# Patient Record
Sex: MALE | Race: WHITE | NOT HISPANIC OR LATINO | Employment: OTHER | URBAN - METROPOLITAN AREA
[De-identification: names, ages, dates, MRNs, and addresses within clinical notes are randomized per-mention and may not be internally consistent; named-entity substitution may affect disease eponyms.]

---

## 2017-01-25 ENCOUNTER — TRANSCRIBE ORDERS (OUTPATIENT)
Dept: ADMINISTRATIVE | Facility: HOSPITAL | Age: 67
End: 2017-01-25

## 2017-01-25 DIAGNOSIS — M79.601 RIGHT ARM PAIN: Primary | ICD-10-CM

## 2017-02-02 ENCOUNTER — HOSPITAL ENCOUNTER (OUTPATIENT)
Dept: INFUSION THERAPY | Facility: HOSPITAL | Age: 67
Discharge: HOME | End: 2017-02-02
Attending: SPECIALIST | Admitting: PSYCHIATRY & NEUROLOGY

## 2017-02-02 DIAGNOSIS — M79.601 RIGHT ARM PAIN: ICD-10-CM

## 2017-02-02 PROCEDURE — 95886 MUSC TEST DONE W/N TEST COMP: CPT | Performed by: PSYCHIATRY & NEUROLOGY

## 2017-02-02 PROCEDURE — 95909 NRV CNDJ TST 5-6 STUDIES: CPT | Performed by: PSYCHIATRY & NEUROLOGY

## 2017-02-02 PROCEDURE — 95908 NRV CNDJ TST 3-4 STUDIES: CPT

## 2017-02-02 PROCEDURE — 95909 NRV CNDJ TST 5-6 STUDIES: CPT

## 2017-02-02 PROCEDURE — 95886 MUSC TEST DONE W/N TEST COMP: CPT

## 2018-01-03 ENCOUNTER — OFFICE VISIT CONVERTED (OUTPATIENT)
Dept: OTOLARYNGOLOGY | Facility: CLINIC | Age: 68
End: 2018-01-03
Attending: OTOLARYNGOLOGY

## 2018-01-03 ENCOUNTER — CONVERSION ENCOUNTER (OUTPATIENT)
Dept: OTOLARYNGOLOGY | Facility: CLINIC | Age: 68
End: 2018-01-03

## 2018-04-09 ENCOUNTER — OFFICE VISIT CONVERTED (OUTPATIENT)
Dept: FAMILY MEDICINE CLINIC | Age: 68
End: 2018-04-09
Attending: FAMILY MEDICINE

## 2018-04-26 ENCOUNTER — CONVERSION ENCOUNTER (OUTPATIENT)
Dept: UROLOGY | Facility: CLINIC | Age: 68
End: 2018-04-26

## 2018-04-26 ENCOUNTER — OFFICE VISIT CONVERTED (OUTPATIENT)
Dept: UROLOGY | Facility: CLINIC | Age: 68
End: 2018-04-26
Attending: UROLOGY

## 2018-05-02 ENCOUNTER — PROCEDURE VISIT CONVERTED (OUTPATIENT)
Dept: UROLOGY | Facility: CLINIC | Age: 68
End: 2018-05-02
Attending: UROLOGY

## 2018-05-15 ENCOUNTER — OFFICE VISIT CONVERTED (OUTPATIENT)
Dept: UROLOGY | Facility: CLINIC | Age: 68
End: 2018-05-15
Attending: UROLOGY

## 2018-05-15 ENCOUNTER — CONVERSION ENCOUNTER (OUTPATIENT)
Dept: UROLOGY | Facility: CLINIC | Age: 68
End: 2018-05-15

## 2018-06-01 ENCOUNTER — OFFICE VISIT CONVERTED (OUTPATIENT)
Dept: FAMILY MEDICINE CLINIC | Age: 68
End: 2018-06-01
Attending: NURSE PRACTITIONER

## 2018-09-26 ENCOUNTER — OFFICE VISIT CONVERTED (OUTPATIENT)
Dept: FAMILY MEDICINE CLINIC | Age: 68
End: 2018-09-26
Attending: NURSE PRACTITIONER

## 2019-01-17 ENCOUNTER — HOSPITAL ENCOUNTER (OUTPATIENT)
Dept: OTHER | Facility: HOSPITAL | Age: 69
Discharge: HOME | End: 2019-01-17
Attending: NURSE PRACTITIONER

## 2019-01-17 ENCOUNTER — OFFICE VISIT CONVERTED (OUTPATIENT)
Dept: FAMILY MEDICINE CLINIC | Age: 69
End: 2019-01-17
Attending: NURSE PRACTITIONER

## 2019-01-19 LAB — BACTERIA SPEC AEROBE CULT: NORMAL

## 2019-02-20 ENCOUNTER — HOSPITAL ENCOUNTER (OUTPATIENT)
Dept: OTHER | Facility: HOSPITAL | Age: 69
Discharge: HOME | End: 2019-02-20
Attending: FAMILY MEDICINE

## 2019-02-20 ENCOUNTER — OFFICE VISIT CONVERTED (OUTPATIENT)
Dept: FAMILY MEDICINE CLINIC | Age: 69
End: 2019-02-20
Attending: FAMILY MEDICINE

## 2019-02-20 LAB
ALBUMIN SERPL-MCNC: 4.3 G/DL (ref 3.5–5)
ALBUMIN/GLOB SERPL: 1.3 {RATIO} (ref 1.4–2.6)
ALP SERPL-CCNC: 61 U/L (ref 56–155)
ALT SERPL-CCNC: 31 U/L (ref 10–40)
ANION GAP SERPL CALC-SCNC: 15 MMOL/L (ref 8–19)
AST SERPL-CCNC: 21 U/L (ref 15–50)
BASOPHILS # BLD MANUAL: 0.03 10*3/UL (ref 0–0.2)
BASOPHILS NFR BLD MANUAL: 0.4 % (ref 0–3)
BILIRUB SERPL-MCNC: 0.22 MG/DL (ref 0.2–1.3)
BUN SERPL-MCNC: 16 MG/DL (ref 5–25)
BUN/CREAT SERPL: 20 {RATIO} (ref 6–20)
CALCIUM SERPL-MCNC: 9.6 MG/DL (ref 8.7–10.4)
CHLORIDE SERPL-SCNC: 97 MMOL/L (ref 99–111)
CHOLEST SERPL-MCNC: 223 MG/DL (ref 107–200)
CHOLEST/HDLC SERPL: 7.7 {RATIO} (ref 3–6)
CONV CO2: 27 MMOL/L (ref 22–32)
CONV TOTAL PROTEIN: 7.6 G/DL (ref 6.3–8.2)
CREAT UR-MCNC: 0.82 MG/DL (ref 0.7–1.2)
DEPRECATED RDW RBC AUTO: 41.9 FL
EOSINOPHIL # BLD MANUAL: 0.23 10*3/UL (ref 0–0.7)
EOSINOPHIL NFR BLD MANUAL: 3 % (ref 0–7)
ERYTHROCYTE [DISTWIDTH] IN BLOOD BY AUTOMATED COUNT: 12.4 % (ref 11.5–14.5)
GFR SERPLBLD BASED ON 1.73 SQ M-ARVRAT: >60 ML/MIN/{1.73_M2}
GLOBULIN UR ELPH-MCNC: 3.3 G/DL (ref 2–3.5)
GLUCOSE SERPL-MCNC: 115 MG/DL (ref 70–99)
GRANS (ABSOLUTE): 3.56 10*3/UL (ref 2–8)
GRANS: 45.9 % (ref 30–85)
HBA1C MFR BLD: 14.5 G/DL (ref 14–18)
HCT VFR BLD AUTO: 43.6 % (ref 42–52)
HDLC SERPL-MCNC: 29 MG/DL (ref 40–60)
IMM GRANULOCYTES # BLD: 0.03 10*3/UL (ref 0–0.54)
IMM GRANULOCYTES NFR BLD: 0.4 % (ref 0–0.43)
LDLC SERPL CALC-MCNC: 99 MG/DL (ref 70–100)
LYMPHOCYTES # BLD MANUAL: 3.27 10*3/UL (ref 1–5)
LYMPHOCYTES NFR BLD MANUAL: 8.2 % (ref 3–10)
MCH RBC QN AUTO: 30.4 PG (ref 27–31)
MCHC RBC AUTO-ENTMCNC: 33.3 G/DL (ref 33–37)
MCV RBC AUTO: 91.4 FL (ref 80–96)
MONOCYTES # BLD AUTO: 0.64 10*3/UL (ref 0.2–1.2)
OSMOLALITY SERPL CALC.SUM OF ELEC: 282 MOSM/KG (ref 273–304)
PLATELET # BLD AUTO: 205 10*3/UL (ref 130–400)
PMV BLD AUTO: 10.2 FL (ref 7.4–10.4)
POTASSIUM SERPL-SCNC: 4.4 MMOL/L (ref 3.5–5.3)
RBC # BLD AUTO: 4.77 10*6/UL (ref 4.7–6.1)
SODIUM SERPL-SCNC: 135 MMOL/L (ref 135–147)
TRIGL SERPL-MCNC: 732 MG/DL (ref 40–150)
TSH SERPL-ACNC: 1.77 M[IU]/L (ref 0.27–4.2)
VARIANT LYMPHS NFR BLD MANUAL: 42.1 % (ref 20–45)
WBC # BLD AUTO: 7.76 10*3/UL (ref 4.8–10.8)

## 2019-05-02 ENCOUNTER — HOSPITAL ENCOUNTER (OUTPATIENT)
Dept: OTHER | Facility: HOSPITAL | Age: 69
Discharge: HOME | End: 2019-05-02
Attending: FAMILY MEDICINE

## 2019-05-02 LAB
ANION GAP SERPL CALC-SCNC: 15 MMOL/L (ref 8–19)
BUN SERPL-MCNC: 16 MG/DL (ref 5–25)
BUN/CREAT SERPL: 17 {RATIO} (ref 6–20)
CALCIUM SERPL-MCNC: 9.4 MG/DL (ref 8.7–10.4)
CHLORIDE SERPL-SCNC: 97 MMOL/L (ref 99–111)
CHOLEST SERPL-MCNC: 214 MG/DL (ref 107–200)
CHOLEST/HDLC SERPL: 5.4 {RATIO} (ref 3–6)
CONV CO2: 29 MMOL/L (ref 22–32)
CREAT UR-MCNC: 0.92 MG/DL (ref 0.7–1.2)
EST. AVERAGE GLUCOSE BLD GHB EST-MCNC: 117 MG/DL
GFR SERPLBLD BASED ON 1.73 SQ M-ARVRAT: >60 ML/MIN/{1.73_M2}
GLUCOSE SERPL-MCNC: 113 MG/DL (ref 70–99)
HBA1C MFR BLD: 5.7 % (ref 3.5–5.7)
HDLC SERPL-MCNC: 40 MG/DL (ref 40–60)
LDLC SERPL CALC-MCNC: 139 MG/DL (ref 70–100)
OSMOLALITY SERPL CALC.SUM OF ELEC: 284 MOSM/KG (ref 273–304)
POTASSIUM SERPL-SCNC: 4.7 MMOL/L (ref 3.5–5.3)
SODIUM SERPL-SCNC: 136 MMOL/L (ref 135–147)
TRIGL SERPL-MCNC: 177 MG/DL (ref 40–150)
VLDLC SERPL-MCNC: 35 MG/DL (ref 5–37)

## 2019-06-13 ENCOUNTER — APPOINTMENT (OUTPATIENT)
Dept: PREADMISSION TESTING | Facility: HOSPITAL | Age: 69
End: 2019-06-13

## 2019-06-14 ENCOUNTER — APPOINTMENT (OUTPATIENT)
Dept: PREADMISSION TESTING | Facility: HOSPITAL | Age: 69
End: 2019-06-14

## 2019-06-14 ENCOUNTER — HOSPITAL ENCOUNTER (OUTPATIENT)
Dept: GENERAL RADIOLOGY | Facility: HOSPITAL | Age: 69
Discharge: HOME | End: 2019-06-14
Admitting: ORTHOPAEDIC SURGERY

## 2019-06-14 ENCOUNTER — HOSPITAL ENCOUNTER (OUTPATIENT)
Dept: GENERAL RADIOLOGY | Facility: HOSPITAL | Age: 69
Discharge: HOME | End: 2019-06-14

## 2019-06-14 VITALS
HEIGHT: 75 IN | SYSTOLIC BLOOD PRESSURE: 132 MMHG | DIASTOLIC BLOOD PRESSURE: 69 MMHG | WEIGHT: 234 LBS | BODY MASS INDEX: 29.09 KG/M2 | RESPIRATION RATE: 16 BRPM | OXYGEN SATURATION: 97 % | TEMPERATURE: 97.6 F | HEART RATE: 60 BPM

## 2019-06-14 LAB
ANION GAP SERPL CALCULATED.3IONS-SCNC: 10.8 MMOL/L
BUN BLD-MCNC: 15 MG/DL (ref 8–23)
BUN/CREAT SERPL: 16 (ref 7–25)
CALCIUM SPEC-SCNC: 9.7 MG/DL (ref 8.6–10.5)
CHLORIDE SERPL-SCNC: 97 MMOL/L (ref 98–107)
CO2 SERPL-SCNC: 29.2 MMOL/L (ref 22–29)
CREAT BLD-MCNC: 0.94 MG/DL (ref 0.76–1.27)
DEPRECATED RDW RBC AUTO: 42.5 FL (ref 37–54)
ERYTHROCYTE [DISTWIDTH] IN BLOOD BY AUTOMATED COUNT: 12.6 % (ref 12.3–15.4)
GFR SERPL CREATININE-BSD FRML MDRD: 80 ML/MIN/1.73
GLUCOSE BLD-MCNC: 115 MG/DL (ref 65–99)
HCT VFR BLD AUTO: 46.3 % (ref 37.5–51)
HGB BLD-MCNC: 14.9 G/DL (ref 13–17.7)
MCH RBC QN AUTO: 29.7 PG (ref 26.6–33)
MCHC RBC AUTO-ENTMCNC: 32.2 G/DL (ref 31.5–35.7)
MCV RBC AUTO: 92.4 FL (ref 79–97)
PLATELET # BLD AUTO: 184 10*3/MM3 (ref 140–450)
PMV BLD AUTO: 9.8 FL (ref 6–12)
POTASSIUM BLD-SCNC: 5.1 MMOL/L (ref 3.5–5.2)
RBC # BLD AUTO: 5.01 10*6/MM3 (ref 4.14–5.8)
SODIUM BLD-SCNC: 137 MMOL/L (ref 136–145)
WBC NRBC COR # BLD: 7.13 10*3/MM3 (ref 3.4–10.8)

## 2019-06-14 PROCEDURE — 73030 X-RAY EXAM OF SHOULDER: CPT

## 2019-06-14 PROCEDURE — 36415 COLL VENOUS BLD VENIPUNCTURE: CPT

## 2019-06-14 PROCEDURE — 93010 ELECTROCARDIOGRAM REPORT: CPT | Performed by: INTERNAL MEDICINE

## 2019-06-14 PROCEDURE — 80048 BASIC METABOLIC PNL TOTAL CA: CPT | Performed by: ORTHOPAEDIC SURGERY

## 2019-06-14 PROCEDURE — 93005 ELECTROCARDIOGRAM TRACING: CPT

## 2019-06-14 PROCEDURE — 71046 X-RAY EXAM CHEST 2 VIEWS: CPT

## 2019-06-14 PROCEDURE — 85027 COMPLETE CBC AUTOMATED: CPT | Performed by: ORTHOPAEDIC SURGERY

## 2019-06-14 RX ORDER — CHLORHEXIDINE GLUCONATE 500 MG/1
CLOTH TOPICAL
Status: ON HOLD | COMMUNITY
End: 2019-06-24

## 2019-06-14 RX ORDER — LISINOPRIL 20 MG/1
20 TABLET ORAL DAILY
COMMUNITY
End: 2021-08-25 | Stop reason: SDUPTHER

## 2019-06-14 RX ORDER — GABAPENTIN 300 MG/1
300 CAPSULE ORAL 4 TIMES DAILY
COMMUNITY
End: 2021-07-06

## 2019-06-14 RX ORDER — HYDROCODONE BITARTRATE AND ACETAMINOPHEN 10; 325 MG/1; MG/1
1 TABLET ORAL 3 TIMES DAILY PRN
Status: ON HOLD | COMMUNITY
End: 2019-06-24

## 2019-06-14 NOTE — DISCHARGE INSTRUCTIONS
2% CHLORAHEXIDINE GLUCONATE* CLOTH  Preparing or “prepping” skin before surgery can reduce the risk of infection at the surgical site. To make the process easier, [facility] has chosen disposable cloths moistened with a rinse-free, 2% Chlorhexidine Gluconate (CHG) antiseptic solution. The steps below outline the prepping process and should be carefully followed.        Use the prep cloth on the area that is circled in the diagram             Directions Night before Surgery  1) Shower using a fresh bar of anti-bacterial soap (such as Dial) and clean washcloth.  Use a clean towel to completely dry your skin.  2) Do not use any lotions, oils or creams on your skin.  3) Open the package and remove 1 cloth, wipe your skin for 30 seconds in a circular motion.  Allow to dry for 3 minutes.  4) Repeat #3 with second cloth.  5) Do not touch your eyes, ears, or mouth with the prep cloth.  6) Allow the wet prep solution to air dry.  7) Discard the prep cloth and wash your hands with soap and water.   8) Dress in clean bed clothes and sleep on fresh clean bed sheets.   9) You may experience some temporary itching after the prep.    Directions Day of Surgery  1) Repeat steps 1,2,3,4,5,6,7, and 9.   2) Dress in clean clothes before coming to the hospital.    BACTROBAN NASAL OINTMENT  There are many germs normally in your nose. Bactroban is an ointment that will help reduce these germs. Please follow these instructions for Bactroban use:      ____The day before surgery in the morning  Date________    ____The day before surgery in the evening              Date________    ____The day of surgery in the morning    Date________    **Squirt ½ package of Bactroban Ointment onto a cotton applicator and apply to inside of 1st nostril.  Squirt the remaining Bactroban and apply to the inside of the other nostril.    Take the following medications the morning of surgery with a small sip of water:  GABAPENTIN, PAIN PILLS AS  NEEDED      ARRIVAL TIME 1100 TO MAIN OR         General Instructions:  • Do not eat solid food after midnight the night before surgery.  • You may drink clear liquids day of surgery but must stop at least one hour before your hospital arrival time - CUTOFF TIME 1000  • It is beneficial for you to have a clear drink that contains carbohydrates the day of surgery.  We suggest a 12 to 20 ounce bottle of Gatorade or Powerade for non-diabetic patients or a 12 to 20 ounce bottle of G2 or Powerade Zero for diabetic patients. (Pediatric patients, are not advised to drink a 12 to 20 ounce carbohydrate drink)    Clear liquids are liquids you can see through.  Nothing red in color.     Plain water                               Sports drinks  Sodas                                   Gelatin (Jell-O)  Fruit juices without pulp such as white grape juice and apple juice  Popsicles that contain no fruit or yogurt  Tea or coffee (no cream or milk added)  Gatorade / Powerade  G2 / Powerade Zero    • Infants may have breast milk up to four hours before surgery.  • Infants drinking formula may drink formula up to six hours before surgery.   • Patients who avoid smoking, chewing tobacco and alcohol for 4 weeks prior to surgery have a reduced risk of post-operative complications.  Quit smoking as many days before surgery as you can.  • Do not smoke, use chewing tobacco or drink alcohol the day of surgery.   • If applicable bring your C-PAP/ BI-PAP machine.  • Bring any papers given to you in the doctor’s office.  • Wear clean comfortable clothes and socks.  • Do not wear contact lenses, false eyelashes or make-up.  Bring a case for your glasses.   • Bring crutches or walker if applicable.  • Remove all piercings.  Leave jewelry and any other valuables at home.  • Hair extensions with metal clips must be removed prior to surgery.  • The Pre-Admission Testing nurse will instruct you to bring medications if unable to obtain an accurate list  in Pre-Admission Testing.            Preventing a Surgical Site Infection:  • For 2 to 3 days before surgery, avoid shaving with a razor because the razor can irritate skin and make it easier to develop an infection.    • Any areas of open skin can increase the risk of a post-operative wound infection by allowing bacteria to enter and travel throughout the body.  Notify your surgeon if you have any skin wounds / rashes even if it is not near the expected surgical site.  The area will need assessed to determine if surgery should be delayed until it is healed.  • The night prior to surgery sleep in a clean bed with clean clothing.  Do not allow pets to sleep with you.  • Shower on the morning of surgery using a fresh bar of anti-bacterial soap (such as Dial) and clean washcloth.  Dry with a clean towel and dress in clean clothing.  • Ask your surgeon if you will be receiving antibiotics prior to surgery.  • Make sure you, your family, and all healthcare providers clean their hands with soap and water or an alcohol based hand  before caring for you or your wound.    Day of surgery:  Upon arrival, a Pre-op nurse and Anesthesiologist will review your health history, obtain vital signs, and answer questions you may have.  The only belongings needed at this time will be a list of your home medications and if applicable your C-PAP/BI-PAP machine.  If you are staying overnight your family can leave the rest of your belongings in the car and bring them to your room later.  A Pre-op nurse will start an IV and you may receive medication in preparation for surgery, including something to help you relax.  Your family will be able to see you in the Pre-op area.  While you are in surgery your family should notify the waiting room  if they leave the waiting room area and provide a contact phone number.    Please be aware that surgery does come with discomfort.  We want to make every effort to control your  discomfort so please discuss any uncontrolled symptoms with your nurse.   Your doctor will most likely have prescribed pain medications.      If you are going home after surgery you will receive individualized written care instructions before being discharged.  A responsible adult must drive you to and from the hospital on the day of your surgery and stay with you for 24 hours.    If you are staying overnight following surgery, you will be transported to your hospital room following the recovery period.  [facility] has all private rooms.    You have received a list of surgical assistants for your reference.  If you have any questions please call Pre-Admission Testing at [phone].  Deductibles and co-payments are collected on the day of service. Please be prepared to pay the required co-pay, deductible or deposit on the day of service as defined by your plan.

## 2019-06-24 ENCOUNTER — APPOINTMENT (OUTPATIENT)
Dept: GENERAL RADIOLOGY | Facility: HOSPITAL | Age: 69
End: 2019-06-24

## 2019-06-24 ENCOUNTER — ANESTHESIA EVENT (OUTPATIENT)
Dept: PERIOP | Facility: HOSPITAL | Age: 69
End: 2019-06-24

## 2019-06-24 ENCOUNTER — HOSPITAL ENCOUNTER (INPATIENT)
Facility: HOSPITAL | Age: 69
LOS: 1 days | Discharge: HOME | End: 2019-06-25
Attending: ORTHOPAEDIC SURGERY | Admitting: ORTHOPAEDIC SURGERY

## 2019-06-24 ENCOUNTER — ANESTHESIA (OUTPATIENT)
Dept: PERIOP | Facility: HOSPITAL | Age: 69
End: 2019-06-24

## 2019-06-24 DIAGNOSIS — M19.012 PRIMARY OSTEOARTHRITIS OF LEFT SHOULDER: Primary | ICD-10-CM

## 2019-06-24 LAB
HCT VFR BLD AUTO: 40.1 % (ref 37.5–51)
HGB BLD-MCNC: 12.9 G/DL (ref 13–17.7)

## 2019-06-24 PROCEDURE — C1776 JOINT DEVICE (IMPLANTABLE): HCPCS | Performed by: ORTHOPAEDIC SURGERY

## 2019-06-24 PROCEDURE — 85018 HEMOGLOBIN: CPT | Performed by: ORTHOPAEDIC SURGERY

## 2019-06-24 PROCEDURE — 73020 X-RAY EXAM OF SHOULDER: CPT

## 2019-06-24 PROCEDURE — 25010000002 NEOSTIGMINE 10 MG/10ML SOLUTION: Performed by: ANESTHESIOLOGY

## 2019-06-24 PROCEDURE — 25010000002 FENTANYL CITRATE (PF) 100 MCG/2ML SOLUTION: Performed by: ANESTHESIOLOGY

## 2019-06-24 PROCEDURE — 0RRK00Z REPLACEMENT OF LEFT SHOULDER JOINT WITH REVERSE BALL AND SOCKET SYNTHETIC SUBSTITUTE, OPEN APPROACH: ICD-10-PCS | Performed by: ORTHOPAEDIC SURGERY

## 2019-06-24 PROCEDURE — 25010000002 DEXAMETHASONE PER 1 MG: Performed by: ANESTHESIOLOGY

## 2019-06-24 PROCEDURE — 25010000002 PROPOFOL 10 MG/ML EMULSION: Performed by: ANESTHESIOLOGY

## 2019-06-24 PROCEDURE — L3670 SO ACRO/CLAV CAN WEB PRE OTS: HCPCS | Performed by: ORTHOPAEDIC SURGERY

## 2019-06-24 PROCEDURE — 25010000003 CEFAZOLIN IN DEXTROSE 2-4 GM/100ML-% SOLUTION: Performed by: ORTHOPAEDIC SURGERY

## 2019-06-24 PROCEDURE — 25010000002 ROPIVACAINE PER 1 MG: Performed by: ANESTHESIOLOGY

## 2019-06-24 PROCEDURE — 25010000002 PHENYLEPHRINE PER 1 ML: Performed by: ANESTHESIOLOGY

## 2019-06-24 PROCEDURE — 25010000002 MIDAZOLAM PER 1 MG: Performed by: ANESTHESIOLOGY

## 2019-06-24 PROCEDURE — 25010000002 SUCCINYLCHOLINE PER 20 MG: Performed by: ANESTHESIOLOGY

## 2019-06-24 PROCEDURE — 85014 HEMATOCRIT: CPT | Performed by: ORTHOPAEDIC SURGERY

## 2019-06-24 DEVICE — STEM HUM/SHLDR TRABECULARMETAL REV 16X130MM: Type: IMPLANTABLE DEVICE | Site: SHOULDER | Status: FUNCTIONAL

## 2019-06-24 DEVICE — SCRW FIX LK HEX 4.75X20MM: Type: IMPLANTABLE DEVICE | Site: SHOULDER | Status: FUNCTIONAL

## 2019-06-24 DEVICE — PIN STNMAN 3.2MM 9IN: Type: IMPLANTABLE DEVICE | Site: SHOULDER | Status: FUNCTIONAL

## 2019-06-24 DEVICE — BASEPLT GLEN COMPR MINI W TPR ADAPTR 25: Type: IMPLANTABLE DEVICE | Site: SHOULDER | Status: FUNCTIONAL

## 2019-06-24 DEVICE — IMPLANTABLE DEVICE: Type: IMPLANTABLE DEVICE | Site: SHOULDER | Status: FUNCTIONAL

## 2019-06-24 DEVICE — SCRW COMPRNSV CNTRL 6.5X20MM REUS: Type: IMPLANTABLE DEVICE | Site: SHOULDER | Status: FUNCTIONAL

## 2019-06-24 DEVICE — LINER HUM/SHLDR TRABECULARMETAL REV POLY 60DEG 36MM PLS0: Type: IMPLANTABLE DEVICE | Site: SHOULDER | Status: FUNCTIONAL

## 2019-06-24 DEVICE — GLENOSPHERE VERSA DIAL FXD OFFST36 PLS3: Type: IMPLANTABLE DEVICE | Site: SHOULDER | Status: FUNCTIONAL

## 2019-06-24 RX ORDER — DIPHENHYDRAMINE HCL 25 MG
25 CAPSULE ORAL
Status: DISCONTINUED | OUTPATIENT
Start: 2019-06-24 | End: 2019-06-24 | Stop reason: HOSPADM

## 2019-06-24 RX ORDER — PROPOFOL 10 MG/ML
VIAL (ML) INTRAVENOUS AS NEEDED
Status: DISCONTINUED | OUTPATIENT
Start: 2019-06-24 | End: 2019-06-24 | Stop reason: SURG

## 2019-06-24 RX ORDER — HYDROCODONE BITARTRATE AND ACETAMINOPHEN 7.5; 325 MG/1; MG/1
1 TABLET ORAL ONCE AS NEEDED
Status: DISCONTINUED | OUTPATIENT
Start: 2019-06-24 | End: 2019-06-24 | Stop reason: HOSPADM

## 2019-06-24 RX ORDER — PROMETHAZINE HYDROCHLORIDE 25 MG/ML
12.5 INJECTION, SOLUTION INTRAMUSCULAR; INTRAVENOUS ONCE AS NEEDED
Status: DISCONTINUED | OUTPATIENT
Start: 2019-06-24 | End: 2019-06-24 | Stop reason: HOSPADM

## 2019-06-24 RX ORDER — HYDROMORPHONE HYDROCHLORIDE 1 MG/ML
0.5 INJECTION, SOLUTION INTRAMUSCULAR; INTRAVENOUS; SUBCUTANEOUS
Status: DISCONTINUED | OUTPATIENT
Start: 2019-06-24 | End: 2019-06-25 | Stop reason: HOSPADM

## 2019-06-24 RX ORDER — FENTANYL CITRATE 50 UG/ML
50 INJECTION, SOLUTION INTRAMUSCULAR; INTRAVENOUS
Status: DISCONTINUED | OUTPATIENT
Start: 2019-06-24 | End: 2019-06-24 | Stop reason: HOSPADM

## 2019-06-24 RX ORDER — HYDRALAZINE HYDROCHLORIDE 20 MG/ML
5 INJECTION INTRAMUSCULAR; INTRAVENOUS
Status: DISCONTINUED | OUTPATIENT
Start: 2019-06-24 | End: 2019-06-24 | Stop reason: HOSPADM

## 2019-06-24 RX ORDER — PROMETHAZINE HYDROCHLORIDE 25 MG/1
25 TABLET ORAL ONCE AS NEEDED
Status: DISCONTINUED | OUTPATIENT
Start: 2019-06-24 | End: 2019-06-24 | Stop reason: HOSPADM

## 2019-06-24 RX ORDER — MELOXICAM 15 MG/1
15 TABLET ORAL DAILY
Status: DISCONTINUED | OUTPATIENT
Start: 2019-06-24 | End: 2019-06-25 | Stop reason: HOSPADM

## 2019-06-24 RX ORDER — ROCURONIUM BROMIDE 10 MG/ML
INJECTION, SOLUTION INTRAVENOUS AS NEEDED
Status: DISCONTINUED | OUTPATIENT
Start: 2019-06-24 | End: 2019-06-24 | Stop reason: SURG

## 2019-06-24 RX ORDER — CEFAZOLIN SODIUM 2 G/100ML
2 INJECTION, SOLUTION INTRAVENOUS
Status: COMPLETED | OUTPATIENT
Start: 2019-06-24 | End: 2019-06-24

## 2019-06-24 RX ORDER — OXYCODONE AND ACETAMINOPHEN 10; 325 MG/1; MG/1
TABLET ORAL
Status: COMPLETED
Start: 2019-06-24 | End: 2019-06-24

## 2019-06-24 RX ORDER — GLYCOPYRROLATE 0.2 MG/ML
INJECTION INTRAMUSCULAR; INTRAVENOUS AS NEEDED
Status: DISCONTINUED | OUTPATIENT
Start: 2019-06-24 | End: 2019-06-24 | Stop reason: SURG

## 2019-06-24 RX ORDER — OXYCODONE AND ACETAMINOPHEN 10; 325 MG/1; MG/1
1 TABLET ORAL EVERY 4 HOURS PRN
Status: DISCONTINUED | OUTPATIENT
Start: 2019-06-24 | End: 2019-06-25 | Stop reason: HOSPADM

## 2019-06-24 RX ORDER — GABAPENTIN 300 MG/1
300 CAPSULE ORAL ONCE
Status: COMPLETED | OUTPATIENT
Start: 2019-06-24 | End: 2019-06-24

## 2019-06-24 RX ORDER — SODIUM CHLORIDE, SODIUM LACTATE, POTASSIUM CHLORIDE, CALCIUM CHLORIDE 600; 310; 30; 20 MG/100ML; MG/100ML; MG/100ML; MG/100ML
9 INJECTION, SOLUTION INTRAVENOUS CONTINUOUS PRN
Status: DISCONTINUED | OUTPATIENT
Start: 2019-06-24 | End: 2019-06-24 | Stop reason: HOSPADM

## 2019-06-24 RX ORDER — NEOSTIGMINE METHYLSULFATE 1 MG/ML
INJECTION, SOLUTION INTRAVENOUS AS NEEDED
Status: DISCONTINUED | OUTPATIENT
Start: 2019-06-24 | End: 2019-06-24 | Stop reason: SURG

## 2019-06-24 RX ORDER — FLUMAZENIL 0.1 MG/ML
0.2 INJECTION INTRAVENOUS AS NEEDED
Status: DISCONTINUED | OUTPATIENT
Start: 2019-06-24 | End: 2019-06-24 | Stop reason: HOSPADM

## 2019-06-24 RX ORDER — POTASSIUM CHLORIDE 750 MG/1
10 TABLET, FILM COATED, EXTENDED RELEASE ORAL 2 TIMES DAILY
COMMUNITY
End: 2021-11-09

## 2019-06-24 RX ORDER — EPHEDRINE SULFATE 50 MG/ML
5 INJECTION, SOLUTION INTRAVENOUS ONCE AS NEEDED
Status: DISCONTINUED | OUTPATIENT
Start: 2019-06-24 | End: 2019-06-24 | Stop reason: HOSPADM

## 2019-06-24 RX ORDER — ROPIVACAINE HYDROCHLORIDE 5 MG/ML
INJECTION, SOLUTION EPIDURAL; INFILTRATION; PERINEURAL
Status: COMPLETED | OUTPATIENT
Start: 2019-06-24 | End: 2019-06-24

## 2019-06-24 RX ORDER — ONDANSETRON 2 MG/ML
4 INJECTION INTRAMUSCULAR; INTRAVENOUS EVERY 6 HOURS PRN
Status: DISCONTINUED | OUTPATIENT
Start: 2019-06-24 | End: 2019-06-25 | Stop reason: HOSPADM

## 2019-06-24 RX ORDER — TRANEXAMIC ACID 100 MG/ML
INJECTION, SOLUTION INTRAVENOUS AS NEEDED
Status: DISCONTINUED | OUTPATIENT
Start: 2019-06-24 | End: 2019-06-24 | Stop reason: SURG

## 2019-06-24 RX ORDER — LABETALOL HYDROCHLORIDE 5 MG/ML
5 INJECTION, SOLUTION INTRAVENOUS
Status: DISCONTINUED | OUTPATIENT
Start: 2019-06-24 | End: 2019-06-24 | Stop reason: HOSPADM

## 2019-06-24 RX ORDER — ONDANSETRON 4 MG/1
4 TABLET, FILM COATED ORAL EVERY 6 HOURS PRN
Status: DISCONTINUED | OUTPATIENT
Start: 2019-06-24 | End: 2019-06-25 | Stop reason: HOSPADM

## 2019-06-24 RX ORDER — PROMETHAZINE HYDROCHLORIDE 25 MG/ML
6.25 INJECTION, SOLUTION INTRAMUSCULAR; INTRAVENOUS
Status: DISCONTINUED | OUTPATIENT
Start: 2019-06-24 | End: 2019-06-24 | Stop reason: HOSPADM

## 2019-06-24 RX ORDER — LIDOCAINE HYDROCHLORIDE 20 MG/ML
INJECTION, SOLUTION INFILTRATION; PERINEURAL AS NEEDED
Status: DISCONTINUED | OUTPATIENT
Start: 2019-06-24 | End: 2019-06-24 | Stop reason: SURG

## 2019-06-24 RX ORDER — PROMETHAZINE HYDROCHLORIDE 25 MG/1
25 SUPPOSITORY RECTAL ONCE AS NEEDED
Status: DISCONTINUED | OUTPATIENT
Start: 2019-06-24 | End: 2019-06-24 | Stop reason: HOSPADM

## 2019-06-24 RX ORDER — MIDAZOLAM HYDROCHLORIDE 1 MG/ML
1 INJECTION INTRAMUSCULAR; INTRAVENOUS
Status: DISCONTINUED | OUTPATIENT
Start: 2019-06-24 | End: 2019-06-24 | Stop reason: HOSPADM

## 2019-06-24 RX ORDER — ACETAMINOPHEN 500 MG
1000 TABLET ORAL ONCE
Status: COMPLETED | OUTPATIENT
Start: 2019-06-24 | End: 2019-06-24

## 2019-06-24 RX ORDER — DOXYCYCLINE HYCLATE 100 MG/1
100 CAPSULE ORAL DAILY
COMMUNITY
End: 2021-11-09

## 2019-06-24 RX ORDER — OXYCODONE AND ACETAMINOPHEN 7.5; 325 MG/1; MG/1
1 TABLET ORAL ONCE AS NEEDED
Status: DISCONTINUED | OUTPATIENT
Start: 2019-06-24 | End: 2019-06-24 | Stop reason: HOSPADM

## 2019-06-24 RX ORDER — ACETAMINOPHEN 325 MG/1
650 TABLET ORAL ONCE AS NEEDED
Status: DISCONTINUED | OUTPATIENT
Start: 2019-06-24 | End: 2019-06-24 | Stop reason: HOSPADM

## 2019-06-24 RX ORDER — SENNA AND DOCUSATE SODIUM 50; 8.6 MG/1; MG/1
2 TABLET, FILM COATED ORAL 2 TIMES DAILY
Status: DISCONTINUED | OUTPATIENT
Start: 2019-06-24 | End: 2019-06-25 | Stop reason: HOSPADM

## 2019-06-24 RX ORDER — NALOXONE HCL 0.4 MG/ML
0.2 VIAL (ML) INJECTION AS NEEDED
Status: DISCONTINUED | OUTPATIENT
Start: 2019-06-24 | End: 2019-06-24 | Stop reason: HOSPADM

## 2019-06-24 RX ORDER — MAGNESIUM HYDROXIDE 1200 MG/15ML
LIQUID ORAL AS NEEDED
Status: DISCONTINUED | OUTPATIENT
Start: 2019-06-24 | End: 2019-06-24 | Stop reason: HOSPADM

## 2019-06-24 RX ORDER — MULTIPLE VITAMINS W/ MINERALS TAB 9MG-400MCG
1 TAB ORAL DAILY
Status: DISCONTINUED | OUTPATIENT
Start: 2019-06-25 | End: 2019-06-25 | Stop reason: HOSPADM

## 2019-06-24 RX ORDER — HYDROMORPHONE HYDROCHLORIDE 1 MG/ML
0.5 INJECTION, SOLUTION INTRAMUSCULAR; INTRAVENOUS; SUBCUTANEOUS
Status: DISCONTINUED | OUTPATIENT
Start: 2019-06-24 | End: 2019-06-24 | Stop reason: HOSPADM

## 2019-06-24 RX ORDER — SODIUM CHLORIDE 0.9 % (FLUSH) 0.9 %
3 SYRINGE (ML) INJECTION EVERY 12 HOURS SCHEDULED
Status: DISCONTINUED | OUTPATIENT
Start: 2019-06-24 | End: 2019-06-24 | Stop reason: HOSPADM

## 2019-06-24 RX ORDER — BUPIVACAINE HYDROCHLORIDE AND EPINEPHRINE 2.5; 5 MG/ML; UG/ML
INJECTION, SOLUTION INFILTRATION; PERINEURAL AS NEEDED
Status: DISCONTINUED | OUTPATIENT
Start: 2019-06-24 | End: 2019-06-24 | Stop reason: HOSPADM

## 2019-06-24 RX ORDER — FAMOTIDINE 10 MG/ML
20 INJECTION, SOLUTION INTRAVENOUS ONCE
Status: COMPLETED | OUTPATIENT
Start: 2019-06-24 | End: 2019-06-24

## 2019-06-24 RX ORDER — SUCCINYLCHOLINE CHLORIDE 20 MG/ML
INJECTION INTRAMUSCULAR; INTRAVENOUS AS NEEDED
Status: DISCONTINUED | OUTPATIENT
Start: 2019-06-24 | End: 2019-06-24 | Stop reason: SURG

## 2019-06-24 RX ORDER — MIDAZOLAM HYDROCHLORIDE 1 MG/ML
2 INJECTION INTRAMUSCULAR; INTRAVENOUS
Status: DISCONTINUED | OUTPATIENT
Start: 2019-06-24 | End: 2019-06-24 | Stop reason: HOSPADM

## 2019-06-24 RX ORDER — ONDANSETRON 2 MG/ML
4 INJECTION INTRAMUSCULAR; INTRAVENOUS ONCE AS NEEDED
Status: DISCONTINUED | OUTPATIENT
Start: 2019-06-24 | End: 2019-06-24 | Stop reason: HOSPADM

## 2019-06-24 RX ORDER — DOXYCYCLINE 100 MG/1
100 CAPSULE ORAL
Status: DISCONTINUED | OUTPATIENT
Start: 2019-06-25 | End: 2019-06-25 | Stop reason: HOSPADM

## 2019-06-24 RX ORDER — LISINOPRIL 20 MG/1
20 TABLET ORAL DAILY
Status: DISCONTINUED | OUTPATIENT
Start: 2019-06-25 | End: 2019-06-25 | Stop reason: HOSPADM

## 2019-06-24 RX ORDER — NALOXONE HCL 0.4 MG/ML
0.1 VIAL (ML) INJECTION
Status: DISCONTINUED | OUTPATIENT
Start: 2019-06-24 | End: 2019-06-25 | Stop reason: HOSPADM

## 2019-06-24 RX ORDER — BISACODYL 10 MG
10 SUPPOSITORY, RECTAL RECTAL DAILY PRN
Status: DISCONTINUED | OUTPATIENT
Start: 2019-06-24 | End: 2019-06-25 | Stop reason: HOSPADM

## 2019-06-24 RX ORDER — ACETAMINOPHEN 325 MG/1
325 TABLET ORAL EVERY 4 HOURS PRN
Status: DISCONTINUED | OUTPATIENT
Start: 2019-06-24 | End: 2019-06-25 | Stop reason: HOSPADM

## 2019-06-24 RX ORDER — CEFAZOLIN SODIUM 2 G/100ML
2 INJECTION, SOLUTION INTRAVENOUS EVERY 8 HOURS
Status: COMPLETED | OUTPATIENT
Start: 2019-06-24 | End: 2019-06-25

## 2019-06-24 RX ORDER — SODIUM CHLORIDE, SODIUM LACTATE, POTASSIUM CHLORIDE, CALCIUM CHLORIDE 600; 310; 30; 20 MG/100ML; MG/100ML; MG/100ML; MG/100ML
100 INJECTION, SOLUTION INTRAVENOUS CONTINUOUS
Status: DISCONTINUED | OUTPATIENT
Start: 2019-06-24 | End: 2019-06-25 | Stop reason: HOSPADM

## 2019-06-24 RX ORDER — SODIUM CHLORIDE 0.9 % (FLUSH) 0.9 %
3-10 SYRINGE (ML) INJECTION AS NEEDED
Status: DISCONTINUED | OUTPATIENT
Start: 2019-06-24 | End: 2019-06-24 | Stop reason: HOSPADM

## 2019-06-24 RX ORDER — GABAPENTIN 300 MG/1
300 CAPSULE ORAL 4 TIMES DAILY
Status: DISCONTINUED | OUTPATIENT
Start: 2019-06-24 | End: 2019-06-25 | Stop reason: HOSPADM

## 2019-06-24 RX ORDER — OXYCODONE AND ACETAMINOPHEN 10; 325 MG/1; MG/1
2 TABLET ORAL EVERY 4 HOURS PRN
Status: DISCONTINUED | OUTPATIENT
Start: 2019-06-24 | End: 2019-06-25 | Stop reason: HOSPADM

## 2019-06-24 RX ORDER — DEXAMETHASONE SODIUM PHOSPHATE 10 MG/ML
INJECTION INTRAMUSCULAR; INTRAVENOUS AS NEEDED
Status: DISCONTINUED | OUTPATIENT
Start: 2019-06-24 | End: 2019-06-24 | Stop reason: SURG

## 2019-06-24 RX ORDER — DIPHENHYDRAMINE HYDROCHLORIDE 50 MG/ML
12.5 INJECTION INTRAMUSCULAR; INTRAVENOUS
Status: DISCONTINUED | OUTPATIENT
Start: 2019-06-24 | End: 2019-06-24 | Stop reason: HOSPADM

## 2019-06-24 RX ADMIN — PHENYLEPHRINE HYDROCHLORIDE 100 MCG: 10 INJECTION INTRAVENOUS at 14:50

## 2019-06-24 RX ADMIN — PHENYLEPHRINE HYDROCHLORIDE 100 MCG: 10 INJECTION INTRAVENOUS at 14:05

## 2019-06-24 RX ADMIN — ACETAMINOPHEN 1000 MG: 500 TABLET, FILM COATED ORAL at 12:03

## 2019-06-24 RX ADMIN — OXYCODONE HYDROCHLORIDE AND ACETAMINOPHEN 2 TABLET: 10; 325 TABLET ORAL at 16:15

## 2019-06-24 RX ADMIN — EPHEDRINE SULFATE 10 MG: 50 INJECTION INTRAMUSCULAR; INTRAVENOUS; SUBCUTANEOUS at 14:13

## 2019-06-24 RX ADMIN — ROPIVACAINE HYDROCHLORIDE 30 ML: 5 INJECTION, SOLUTION EPIDURAL; INFILTRATION; PERINEURAL at 11:49

## 2019-06-24 RX ADMIN — NEOSTIGMINE METHYLSULFATE 3 MG: 1 INJECTION INTRAVENOUS at 15:06

## 2019-06-24 RX ADMIN — EPHEDRINE SULFATE 10 MG: 50 INJECTION INTRAMUSCULAR; INTRAVENOUS; SUBCUTANEOUS at 14:05

## 2019-06-24 RX ADMIN — GABAPENTIN 300 MG: 300 CAPSULE ORAL at 20:14

## 2019-06-24 RX ADMIN — FAMOTIDINE 20 MG: 10 INJECTION INTRAVENOUS at 12:04

## 2019-06-24 RX ADMIN — CEFAZOLIN SODIUM 2 G: 2 INJECTION, SOLUTION INTRAVENOUS at 13:51

## 2019-06-24 RX ADMIN — ROCURONIUM BROMIDE 30 MG: 10 INJECTION INTRAVENOUS at 14:01

## 2019-06-24 RX ADMIN — MELOXICAM 15 MG: 15 TABLET ORAL at 20:14

## 2019-06-24 RX ADMIN — SENNOSIDES,DOCUSATE SODIUM 2 TABLET: 50; 8.6 TABLET, FILM COATED ORAL at 20:14

## 2019-06-24 RX ADMIN — EPHEDRINE SULFATE 10 MG: 50 INJECTION INTRAMUSCULAR; INTRAVENOUS; SUBCUTANEOUS at 14:00

## 2019-06-24 RX ADMIN — DEXAMETHASONE SODIUM PHOSPHATE 8 MG: 10 INJECTION INTRAMUSCULAR; INTRAVENOUS at 13:51

## 2019-06-24 RX ADMIN — FENTANYL CITRATE 50 MCG: 50 INJECTION, SOLUTION INTRAMUSCULAR; INTRAVENOUS at 11:46

## 2019-06-24 RX ADMIN — SUCCINYLCHOLINE CHLORIDE 100 MG: 20 INJECTION, SOLUTION INTRAMUSCULAR; INTRAVENOUS; PARENTERAL at 13:51

## 2019-06-24 RX ADMIN — GABAPENTIN 300 MG: 300 CAPSULE ORAL at 12:03

## 2019-06-24 RX ADMIN — OXYCODONE HYDROCHLORIDE AND ACETAMINOPHEN 2 TABLET: 10; 325 TABLET ORAL at 20:14

## 2019-06-24 RX ADMIN — TRANEXAMIC ACID 1000 MG: 100 INJECTION, SOLUTION INTRAVENOUS at 14:19

## 2019-06-24 RX ADMIN — PROPOFOL 200 MG: 10 INJECTION, EMULSION INTRAVENOUS at 13:51

## 2019-06-24 RX ADMIN — SODIUM CHLORIDE, POTASSIUM CHLORIDE, SODIUM LACTATE AND CALCIUM CHLORIDE 100 ML/HR: 600; 310; 30; 20 INJECTION, SOLUTION INTRAVENOUS at 20:22

## 2019-06-24 RX ADMIN — CEFAZOLIN SODIUM 2 G: 2 INJECTION, SOLUTION INTRAVENOUS at 22:14

## 2019-06-24 RX ADMIN — LIDOCAINE HYDROCHLORIDE 50 MG: 20 INJECTION, SOLUTION INFILTRATION; PERINEURAL at 13:51

## 2019-06-24 RX ADMIN — SODIUM CHLORIDE, POTASSIUM CHLORIDE, SODIUM LACTATE AND CALCIUM CHLORIDE: 600; 310; 30; 20 INJECTION, SOLUTION INTRAVENOUS at 13:45

## 2019-06-24 RX ADMIN — GLYCOPYRROLATE 0.6 MG: 0.2 INJECTION INTRAMUSCULAR; INTRAVENOUS at 15:06

## 2019-06-24 RX ADMIN — PHENYLEPHRINE HYDROCHLORIDE 100 MCG: 10 INJECTION INTRAVENOUS at 14:13

## 2019-06-24 RX ADMIN — PHENYLEPHRINE HYDROCHLORIDE 100 MCG: 10 INJECTION INTRAVENOUS at 14:39

## 2019-06-24 RX ADMIN — MIDAZOLAM 2 MG: 1 INJECTION INTRAMUSCULAR; INTRAVENOUS at 11:46

## 2019-06-24 RX ADMIN — SODIUM CHLORIDE, POTASSIUM CHLORIDE, SODIUM LACTATE AND CALCIUM CHLORIDE: 600; 310; 30; 20 INJECTION, SOLUTION INTRAVENOUS at 14:40

## 2019-06-24 NOTE — ANESTHESIA PROCEDURE NOTES
Airway  Urgency: elective    Date/Time: 6/24/2019 1:59 PM  Airway not difficult    General Information and Staff    Patient location during procedure: OR  Anesthesiologist: Wilfrido De Leon MD    Indications and Patient Condition  Indications for airway management: airway protection    Preoxygenated: yes      Final Airway Details  Final airway type: endotracheal airway      Successful airway: reinforced tube  Cuffed: yes   Successful intubation technique: direct laryngoscopy  Endotracheal tube insertion site: oral  Blade: Ladonna  Blade size: 3  Cormack-Lehane Classification: grade IIa - partial view of glottis  Placement verified by: chest auscultation and capnometry   Measured from: lips  ETT to lips (cm): 19  Number of attempts at approach: 1    Additional Comments  Atraumatic marisol #3 mac 8.0 johnathon x 1 ebe ebbs tube sec vent cont eyes taped

## 2019-06-24 NOTE — ANESTHESIA PREPROCEDURE EVALUATION
Anesthesia Evaluation     no history of anesthetic complications:               Airway   Mallampati: II  Neck ROM: full  no difficulty expected  Dental - normal exam     Pulmonary - normal exam   (+) a smoker Former,   (-) COPD, asthma, sleep apnea    PE comment: nonlabored  Cardiovascular - normal exam    Rhythm: regular  Rate: normal    (+) hypertension well controlled,   (-) valvular problems/murmurs, past MI, CAD, dysrhythmias, angina      Neuro/Psych  (+) psychiatric history Anxiety,     (-) seizures, TIA, CVA  GI/Hepatic/Renal/Endo - negative ROS   (-) GERD, liver disease, diabetes, hypothyroidism, hyperthyroidism    Musculoskeletal     (+) arthralgias, back pain,   Abdominal    Substance History      OB/GYN          Other   (+) arthritis                     Anesthesia Plan    ASA 2     general and general with block   (ISB for post-op pain control psr)  intravenous induction   Anesthetic plan, all risks, benefits, and alternatives have been provided, discussed and informed consent has been obtained with: patient.

## 2019-06-24 NOTE — ANESTHESIA POSTPROCEDURE EVALUATION
Patient: Ramirez Baltazar    Procedure Summary     Date:  06/24/19 Room / Location:  [facility] OR  / [facility] MAIN OR    Anesthesia Start:  1345 Anesthesia Stop:  1519    Procedure:  TOTAL SHOULDER REVERSE ARTHROPLASTY (Left Shoulder) Diagnosis:      Surgeon:  Enoch Goldman MD Provider:  Wilfrido De Leon MD    Anesthesia Type:  general, general with block ASA Status:  2          Anesthesia Type: general, general with block  Last vitals  BP   122/76 (06/24/19 1535)   Temp   36.4 °C (97.6 °F) (06/24/19 1515)   Pulse   66 (06/24/19 1535)   Resp   16 (06/24/19 1535)     SpO2   98 % (06/24/19 1535)     Post Anesthesia Care and Evaluation    Patient location during evaluation: bedside  Patient participation: complete - patient participated  Level of consciousness: awake  Pain score: 1  Pain management: adequate  Airway patency: patent  Anesthetic complications: No anesthetic complications    Cardiovascular status: acceptable  Respiratory status: acceptable  Hydration status: acceptable    Comments: --------------------            06/24/19 1535     --------------------   BP:       122/76     Pulse:      66       Resp:       16       Temp:                SpO2:      98%      --------------------

## 2019-06-24 NOTE — ANESTHESIA PROCEDURE NOTES
Peripheral Block      Patient reassessed immediately prior to procedure    Patient location during procedure: holding area  Start time: 6/24/2019 11:45 AM  Stop time: 6/24/2019 11:49 AM  Reason for block: at surgeon's request and post-op pain management  Performed by  Anesthesiologist: Nacho Mahmood MD  Preanesthetic Checklist  Completed: patient identified, site marked, surgical consent, pre-op evaluation, timeout performed, IV checked, risks and benefits discussed and monitors and equipment checked  Prep:  Sterile barriers:cap, gloves and mask  Prep: ChloraPrep  Patient monitoring: blood pressure monitoring, continuous pulse oximetry and EKG  Procedure  Sedation:yes    Guidance:ultrasound guided  ULTRASOUND INTERPRETATION.  Using ultrasound guidance a 21 G gauge needle was placed in close proximity to the brachial plexus nerve, at which point, under ultrasound guidance anesthetic was injected in the area of the nerve and spread of the anesthesia was seen on ultrasound in close proximity thereto.  There were no abnormalities seen on ultrasound; a digital image was taken; and the patient tolerated the procedure with no complications. Images:still images obtained, printed/placed on chart    Laterality:left  Block Type:interscalene  Injection Technique:single-shotNeedle Gauge:21 G      Medications Used: ropivacaine (NAROPIN) 0.5 % injection, 30 mL  Med admintered at 6/24/2019 11:49 AM      Post Assessment  Injection Assessment: negative aspiration for heme, no paresthesia on injection and incremental injection  Patient Tolerance:comfortable throughout block  Complications:no

## 2019-06-25 VITALS
BODY MASS INDEX: 29.09 KG/M2 | WEIGHT: 234 LBS | DIASTOLIC BLOOD PRESSURE: 59 MMHG | RESPIRATION RATE: 16 BRPM | TEMPERATURE: 96.7 F | HEIGHT: 75 IN | OXYGEN SATURATION: 97 % | SYSTOLIC BLOOD PRESSURE: 109 MMHG | HEART RATE: 57 BPM

## 2019-06-25 LAB
ANION GAP SERPL CALCULATED.3IONS-SCNC: 11.7 MMOL/L
BUN BLD-MCNC: 10 MG/DL (ref 8–23)
BUN/CREAT SERPL: 12.2 (ref 7–25)
CALCIUM SPEC-SCNC: 8.8 MG/DL (ref 8.6–10.5)
CHLORIDE SERPL-SCNC: 100 MMOL/L (ref 98–107)
CO2 SERPL-SCNC: 24.3 MMOL/L (ref 22–29)
CREAT BLD-MCNC: 0.82 MG/DL (ref 0.76–1.27)
GFR SERPL CREATININE-BSD FRML MDRD: 93 ML/MIN/1.73
GLUCOSE BLD-MCNC: 126 MG/DL (ref 65–99)
HCT VFR BLD AUTO: 41 % (ref 37.5–51)
HGB BLD-MCNC: 13.5 G/DL (ref 13–17.7)
POTASSIUM BLD-SCNC: 4.5 MMOL/L (ref 3.5–5.2)
SODIUM BLD-SCNC: 136 MMOL/L (ref 136–145)

## 2019-06-25 PROCEDURE — 80048 BASIC METABOLIC PNL TOTAL CA: CPT | Performed by: ORTHOPAEDIC SURGERY

## 2019-06-25 PROCEDURE — 25010000002 ENOXAPARIN PER 10 MG: Performed by: ORTHOPAEDIC SURGERY

## 2019-06-25 PROCEDURE — 85014 HEMATOCRIT: CPT | Performed by: ORTHOPAEDIC SURGERY

## 2019-06-25 PROCEDURE — 25010000003 CEFAZOLIN IN DEXTROSE 2-4 GM/100ML-% SOLUTION: Performed by: ORTHOPAEDIC SURGERY

## 2019-06-25 PROCEDURE — 85018 HEMOGLOBIN: CPT | Performed by: ORTHOPAEDIC SURGERY

## 2019-06-25 RX ORDER — SENNA AND DOCUSATE SODIUM 50; 8.6 MG/1; MG/1
2 TABLET, FILM COATED ORAL 2 TIMES DAILY
Qty: 90 TABLET | Refills: 1 | Status: SHIPPED | OUTPATIENT
Start: 2019-06-25 | End: 2021-11-09

## 2019-06-25 RX ORDER — OXYCODONE AND ACETAMINOPHEN 10; 325 MG/1; MG/1
1-2 TABLET ORAL EVERY 4 HOURS PRN
Qty: 56 TABLET | Refills: 0 | Status: SHIPPED | OUTPATIENT
Start: 2019-06-25 | End: 2019-07-04

## 2019-06-25 RX ADMIN — MULTIPLE VITAMINS W/ MINERALS TAB 1 TABLET: TAB at 08:53

## 2019-06-25 RX ADMIN — SERTRALINE 50 MG: 50 TABLET, FILM COATED ORAL at 08:53

## 2019-06-25 RX ADMIN — LISINOPRIL 20 MG: 20 TABLET ORAL at 08:53

## 2019-06-25 RX ADMIN — OXYCODONE HYDROCHLORIDE AND ACETAMINOPHEN 2 TABLET: 10; 325 TABLET ORAL at 10:11

## 2019-06-25 RX ADMIN — CEFAZOLIN SODIUM 2 G: 2 INJECTION, SOLUTION INTRAVENOUS at 06:03

## 2019-06-25 RX ADMIN — GABAPENTIN 300 MG: 300 CAPSULE ORAL at 08:53

## 2019-06-25 RX ADMIN — MELOXICAM 15 MG: 15 TABLET ORAL at 08:53

## 2019-06-25 RX ADMIN — ENOXAPARIN SODIUM 40 MG: 40 INJECTION SUBCUTANEOUS at 08:53

## 2019-06-25 RX ADMIN — OXYCODONE HYDROCHLORIDE AND ACETAMINOPHEN 1 TABLET: 10; 325 TABLET ORAL at 06:06

## 2019-06-25 RX ADMIN — SENNOSIDES,DOCUSATE SODIUM 2 TABLET: 50; 8.6 TABLET, FILM COATED ORAL at 08:53

## 2019-08-21 ENCOUNTER — OFFICE VISIT CONVERTED (OUTPATIENT)
Dept: FAMILY MEDICINE CLINIC | Age: 69
End: 2019-08-21
Attending: FAMILY MEDICINE

## 2019-10-01 ENCOUNTER — CONVERSION ENCOUNTER (OUTPATIENT)
Dept: OTHER | Facility: HOSPITAL | Age: 69
End: 2019-10-01

## 2019-10-02 ENCOUNTER — CONVERSION ENCOUNTER (OUTPATIENT)
Dept: CARDIOLOGY | Facility: CLINIC | Age: 69
End: 2019-10-02
Attending: INTERNAL MEDICINE

## 2019-10-28 ENCOUNTER — OFFICE VISIT CONVERTED (OUTPATIENT)
Dept: FAMILY MEDICINE CLINIC | Age: 69
End: 2019-10-28
Attending: FAMILY MEDICINE

## 2019-12-10 ENCOUNTER — OFFICE VISIT CONVERTED (OUTPATIENT)
Dept: FAMILY MEDICINE CLINIC | Age: 69
End: 2019-12-10
Attending: FAMILY MEDICINE

## 2019-12-16 ENCOUNTER — HOSPITAL ENCOUNTER (OUTPATIENT)
Dept: OTHER | Facility: HOSPITAL | Age: 69
Discharge: HOME | End: 2019-12-16
Attending: FAMILY MEDICINE

## 2020-04-20 ENCOUNTER — OFFICE VISIT CONVERTED (OUTPATIENT)
Dept: FAMILY MEDICINE CLINIC | Age: 70
End: 2020-04-20
Attending: FAMILY MEDICINE

## 2020-07-22 ENCOUNTER — HOSPITAL ENCOUNTER (OUTPATIENT)
Dept: OTHER | Facility: HOSPITAL | Age: 70
Discharge: HOME | End: 2020-07-22
Attending: NEUROLOGICAL SURGERY

## 2020-07-31 ENCOUNTER — HOSPITAL ENCOUNTER (OUTPATIENT)
Dept: PHYSICAL THERAPY | Facility: CLINIC | Age: 70
Setting detail: RECURRING SERIES
Discharge: HOME | End: 2020-09-09

## 2020-08-03 ENCOUNTER — HOSPITAL ENCOUNTER (OUTPATIENT)
Dept: OTHER | Facility: HOSPITAL | Age: 70
Discharge: HOME | End: 2020-08-03
Attending: FAMILY MEDICINE

## 2020-08-03 ENCOUNTER — OFFICE VISIT CONVERTED (OUTPATIENT)
Dept: FAMILY MEDICINE CLINIC | Age: 70
End: 2020-08-03
Attending: FAMILY MEDICINE

## 2020-08-03 LAB
ALBUMIN SERPL-MCNC: 4.4 G/DL (ref 3.5–5)
ALBUMIN/GLOB SERPL: 1.6 {RATIO} (ref 1.4–2.6)
ALP SERPL-CCNC: 72 U/L (ref 56–155)
ALT SERPL-CCNC: 21 U/L (ref 10–40)
ANION GAP SERPL CALC-SCNC: 23 MMOL/L (ref 8–19)
AST SERPL-CCNC: 18 U/L (ref 15–50)
BILIRUB SERPL-MCNC: 0.22 MG/DL (ref 0.2–1.3)
BUN SERPL-MCNC: 11 MG/DL (ref 5–25)
BUN/CREAT SERPL: 10 {RATIO} (ref 6–20)
CALCIUM SERPL-MCNC: 9.9 MG/DL (ref 8.7–10.4)
CHLORIDE SERPL-SCNC: 100 MMOL/L (ref 99–111)
CHOLEST SERPL-MCNC: 257 MG/DL (ref 107–200)
CHOLEST/HDLC SERPL: 8 {RATIO} (ref 3–6)
CONV CO2: 23 MMOL/L (ref 22–32)
CONV TOTAL PROTEIN: 7.1 G/DL (ref 6.3–8.2)
CREAT UR-MCNC: 1.06 MG/DL (ref 0.7–1.2)
ERYTHROCYTE [DISTWIDTH] IN BLOOD BY AUTOMATED COUNT: 13 % (ref 11.5–14.5)
GFR SERPLBLD BASED ON 1.73 SQ M-ARVRAT: >60 ML/MIN/{1.73_M2}
GLOBULIN UR ELPH-MCNC: 2.7 G/DL (ref 2–3.5)
GLUCOSE SERPL-MCNC: 93 MG/DL (ref 70–99)
HBA1C MFR BLD: 15.1 G/DL (ref 14–18)
HCT VFR BLD AUTO: 45.5 % (ref 42–52)
HDLC SERPL-MCNC: 32 MG/DL (ref 40–60)
LDLC SERPL CALC-MCNC: 121 MG/DL (ref 70–100)
MCH RBC QN AUTO: 29.8 PG (ref 27–31)
MCHC RBC AUTO-ENTMCNC: 33.2 G/DL (ref 33–37)
MCV RBC AUTO: 89.7 FL (ref 80–96)
OSMOLALITY SERPL CALC.SUM OF ELEC: 291 MOSM/KG (ref 273–304)
PLATELET # BLD AUTO: 250 10*3/UL (ref 130–400)
PMV BLD AUTO: 10.2 FL (ref 7.4–10.4)
POTASSIUM SERPL-SCNC: 4.8 MMOL/L (ref 3.5–5.3)
PSA SERPL-MCNC: 0.95 NG/ML (ref 0–4)
RBC # BLD AUTO: 5.07 10*6/UL (ref 4.7–6.1)
SODIUM SERPL-SCNC: 141 MMOL/L (ref 135–147)
TRIGL SERPL-MCNC: 749 MG/DL (ref 40–150)
WBC # BLD AUTO: 10.17 10*3/UL (ref 4.8–10.8)

## 2020-08-05 LAB — HCV AB S/CO SERPL IA: <0.1 S/CO RATIO (ref 0–0.9)

## 2020-08-12 ENCOUNTER — HOSPITAL ENCOUNTER (OUTPATIENT)
Dept: OTHER | Facility: HOSPITAL | Age: 70
Discharge: HOME | End: 2020-08-12
Attending: SURGERY

## 2020-08-18 ENCOUNTER — HOSPITAL ENCOUNTER (OUTPATIENT)
Dept: CARDIOLOGY | Facility: HOSPITAL | Age: 70
Discharge: HOME | End: 2020-08-18
Attending: SURGERY

## 2020-12-04 ENCOUNTER — OFFICE VISIT CONVERTED (OUTPATIENT)
Dept: FAMILY MEDICINE CLINIC | Age: 70
End: 2020-12-04
Attending: FAMILY MEDICINE

## 2021-02-22 ENCOUNTER — OFFICE VISIT CONVERTED (OUTPATIENT)
Dept: FAMILY MEDICINE CLINIC | Age: 71
End: 2021-02-22
Attending: FAMILY MEDICINE

## 2021-02-26 ENCOUNTER — HOSPITAL ENCOUNTER (OUTPATIENT)
Dept: OTHER | Facility: HOSPITAL | Age: 71
Discharge: HOME | End: 2021-02-26
Attending: SURGERY

## 2021-02-26 LAB
CREAT BLD-MCNC: 0.9 MG/DL (ref 0.6–1.4)
GFR SERPLBLD BASED ON 1.73 SQ M-ARVRAT: >60 ML/MIN/{1.73_M2}

## 2021-03-04 ENCOUNTER — HOSPITAL ENCOUNTER (OUTPATIENT)
Dept: CARDIOLOGY | Facility: HOSPITAL | Age: 71
Discharge: HOME | End: 2021-03-04
Attending: SURGERY

## 2021-03-12 ENCOUNTER — HOSPITAL ENCOUNTER (OUTPATIENT)
Dept: PREADMISSION TESTING | Facility: HOSPITAL | Age: 71
Discharge: HOME | End: 2021-03-12
Attending: SURGERY

## 2021-03-12 LAB
ALBUMIN SERPL-MCNC: 4.1 G/DL (ref 3.5–5)
ALBUMIN/GLOB SERPL: 1.3 {RATIO} (ref 1.4–2.6)
ALP SERPL-CCNC: 74 U/L (ref 56–155)
ALT SERPL-CCNC: 29 U/L (ref 10–40)
ANION GAP SERPL CALC-SCNC: 12 MMOL/L (ref 8–19)
AST SERPL-CCNC: 21 U/L (ref 15–50)
BASOPHILS # BLD AUTO: 0.05 10*3/UL (ref 0–0.2)
BASOPHILS NFR BLD AUTO: 0.7 % (ref 0–3)
BILIRUB SERPL-MCNC: 0.24 MG/DL (ref 0.2–1.3)
BUN SERPL-MCNC: 13 MG/DL (ref 5–25)
BUN/CREAT SERPL: 13 {RATIO} (ref 6–20)
CALCIUM SERPL-MCNC: 9.7 MG/DL (ref 8.7–10.4)
CHLORIDE SERPL-SCNC: 102 MMOL/L (ref 99–111)
CONV ABS IMM GRAN: 0.05 10*3/UL (ref 0–0.2)
CONV CO2: 28 MMOL/L (ref 22–32)
CONV IMMATURE GRAN: 0.7 % (ref 0–1.8)
CONV TOTAL PROTEIN: 7.3 G/DL (ref 6.3–8.2)
CREAT UR-MCNC: 0.97 MG/DL (ref 0.7–1.2)
DEPRECATED RDW RBC AUTO: 42.1 FL (ref 35.1–43.9)
EOSINOPHIL # BLD AUTO: 0.16 10*3/UL (ref 0–0.7)
EOSINOPHIL # BLD AUTO: 2.2 % (ref 0–7)
ERYTHROCYTE [DISTWIDTH] IN BLOOD BY AUTOMATED COUNT: 12.8 % (ref 11.6–14.4)
GFR SERPLBLD BASED ON 1.73 SQ M-ARVRAT: >60 ML/MIN/{1.73_M2}
GLOBULIN UR ELPH-MCNC: 3.2 G/DL (ref 2–3.5)
GLUCOSE SERPL-MCNC: 144 MG/DL (ref 70–99)
HCT VFR BLD AUTO: 42.9 % (ref 42–52)
HGB BLD-MCNC: 14.3 G/DL (ref 14–18)
LYMPHOCYTES # BLD AUTO: 2.39 10*3/UL (ref 1–5)
LYMPHOCYTES NFR BLD AUTO: 32.3 % (ref 20–45)
MCH RBC QN AUTO: 30.3 PG (ref 27–31)
MCHC RBC AUTO-ENTMCNC: 33.3 G/DL (ref 33–37)
MCV RBC AUTO: 90.9 FL (ref 80–96)
MONOCYTES # BLD AUTO: 0.58 10*3/UL (ref 0.2–1.2)
MONOCYTES NFR BLD AUTO: 7.8 % (ref 3–10)
NEUTROPHILS # BLD AUTO: 4.17 10*3/UL (ref 2–8)
NEUTROPHILS NFR BLD AUTO: 56.3 % (ref 30–85)
NRBC CBCN: 0 % (ref 0–0.7)
OSMOLALITY SERPL CALC.SUM OF ELEC: 289 MOSM/KG (ref 273–304)
PLATELET # BLD AUTO: 177 10*3/UL (ref 130–400)
PMV BLD AUTO: 9.9 FL (ref 9.4–12.4)
POTASSIUM SERPL-SCNC: 4.4 MMOL/L (ref 3.5–5.3)
RBC # BLD AUTO: 4.72 10*6/UL (ref 4.7–6.1)
SODIUM SERPL-SCNC: 138 MMOL/L (ref 135–147)
WBC # BLD AUTO: 7.4 10*3/UL (ref 4.8–10.8)

## 2021-03-13 LAB — SARS-COV-2 RNA SPEC QL NAA+PROBE: NOT DETECTED

## 2021-03-29 ENCOUNTER — OFFICE VISIT CONVERTED (OUTPATIENT)
Dept: FAMILY MEDICINE CLINIC | Age: 71
End: 2021-03-29
Attending: FAMILY MEDICINE

## 2021-04-20 ENCOUNTER — HOSPITAL ENCOUNTER (OUTPATIENT)
Dept: CARDIOLOGY | Facility: HOSPITAL | Age: 71
Discharge: HOME | End: 2021-04-20
Attending: SURGERY

## 2021-04-21 ENCOUNTER — HOSPITAL ENCOUNTER (OUTPATIENT)
Dept: OTHER | Facility: HOSPITAL | Age: 71
Discharge: HOME | End: 2021-04-21
Attending: PHYSICIAN ASSISTANT

## 2021-04-21 ENCOUNTER — OFFICE VISIT (OUTPATIENT)
Dept: ORTHOPEDIC SURGERY | Facility: CLINIC | Age: 71
End: 2021-04-21

## 2021-04-21 VITALS — HEIGHT: 75 IN | BODY MASS INDEX: 30.46 KG/M2 | TEMPERATURE: 97.2 F | WEIGHT: 245 LBS

## 2021-04-21 DIAGNOSIS — M25.432 PAIN AND SWELLING OF LEFT WRIST: Primary | Chronic | ICD-10-CM

## 2021-04-21 DIAGNOSIS — M25.532 PAIN AND SWELLING OF LEFT WRIST: Primary | Chronic | ICD-10-CM

## 2021-04-21 DIAGNOSIS — M79.89 SWELLING OF LEFT HAND: ICD-10-CM

## 2021-04-21 PROBLEM — M54.50 LOW BACK PAIN: Status: ACTIVE | Noted: 2017-08-18

## 2021-04-21 PROCEDURE — 99204 OFFICE O/P NEW MOD 45 MIN: CPT | Performed by: PHYSICIAN ASSISTANT

## 2021-04-21 RX ORDER — HYDROCODONE BITARTRATE AND ACETAMINOPHEN 10; 325 MG/1; MG/1
1 TABLET ORAL EVERY 6 HOURS PRN
COMMUNITY
End: 2022-11-29

## 2021-04-21 RX ORDER — BACLOFEN 10 MG/1
10 TABLET ORAL 3 TIMES DAILY
COMMUNITY
End: 2021-11-09

## 2021-04-21 NOTE — PROGRESS NOTES
"Chief Complaint  Pain and Establish Care of the Left Wrist    Subjective    History of Present Illness      Ramirez Baltazar is a 70 y.o. male who presents to [facility] for complaint of left wrist swelling and pain. He reports it started several years ago and was told it was a ganglion cyst. He states it has continued to grow in size and he is now experiencing pain sharp pains in the wrist and over thenar eminence. He denies injury. Pain with increased activity and improved with rest.        Objective   Vital Signs:   Temp 97.2 °F (36.2 °C)   Ht 190.5 cm (75\")   Wt 111 kg (245 lb)   BMI 30.62 kg/m²     Physical Exam  Vitals signs and nursing note reviewed.   Constitutional:       Appearance: Normal appearance.   Pulmonary:      Effort: Pulmonary effort is normal.   Skin:     General: Skin is warm and dry.      Capillary Refill: Capillary refill takes less than 2 seconds.   Neurological:      General: No focal deficit present.      Mental Status: He is alert and oriented to person, place, and time. Mental status is at baseline.   Psychiatric:         Mood and Affect: Mood normal.         Behavior: Behavior normal.         Thought Content: Thought content normal.         Judgment: Judgment normal.     Ortho Exam   LEFT wrist-Dorsal wrist  The patient is right hand dominant.   There is an area of diffuse swelling, possible cystic lesion, measuring 7.5cm x 2.5cm.   Lesion located on the dorsum on the wrist, at radial aspect, and into the volar aspect.  There are no abnormal pulsations or bruits.   Neurovascular status is intact. Median nerve function is well preserved.   Volar and dorsiflexion of the wrist make the mass slightly more prominent.   Local tenderness is diffuse and ill-defined and there is an aching sensation upon direct palpation of the wrist.   Radial artery pulses are palpable. Median and ulnar nerve functions are both well preserved.   There is no evidence of erosive " changes into the underlying bone and the symptoms on palpation are essentially in the soft tissues.       Result Review :   Radiologic studies - see below for interpretation  Left hand and wrist xrays 3 views each were ordered by Danial Hill PA-C. Performed at [facility] on 4/21/21. Images were independently viewed and interpreted by myself, my impression as follows:  Findings: Moderate-severe degenerative change at the radiocarpal joint with appearance of bone on bone articulation. Moderate degenerative change in the DIP and PIP joints.  Bony lesion: no  Soft tissues: increased  Joint spaces: decreased  Hardware appropriately positioned: yes  Prior studies available for comparison: no               Assessment   Assessment and Plan    Problem List Items Addressed This Visit        Musculoskeletal and Injuries    Swelling of left hand    Relevant Orders    XR Hand 3+ View Left    MRI Wrist Left Without Contrast    Pain and swelling of left wrist - Primary    Relevant Orders    XR Wrist 3+ View Left    XR Hand 3+ View Left    MRI Wrist Left Without Contrast          Follow Up   · Discussion of any imaging in detail. Discussion of orthopaedic goals.  · Risk, benefits, and merits of treatment alternatives reviewed with the patient. Treatment alternatives include: further imaging/testing. Suspect the swelling could be a result of the arthritis in the radiocarpal joint.  · Ice, heat, and/or modalities as beneficial  · To schedule MRI of left wrist  · Patient is encouraged to call or return for any issues or concerns.  · Follow up will be based on when MRI has been performed  • Patient was given instructions and counseling regarding his condition or for health maintenance advice. Please see specific information pulled into the AVS if appropriate.     Danial Hill PA-C   Date of Encounter: 4/21/2021   Electronically signed by Danial Hill PA-C, 04/21/21, 6:01 AM EDT.     EMR  Dragon/Transcription disclaimer:  Much of this encounter note is an electronic transcription/translation of spoken language to printed text. The electronic translation of spoken language may permit erroneous, or at times, nonsensical words or phrases to be inadvertently transcribed; Although I have reviewed the note for such errors, some may still exist.

## 2021-05-10 ENCOUNTER — HOSPITAL ENCOUNTER (OUTPATIENT)
Dept: OTHER | Facility: HOSPITAL | Age: 71
Discharge: HOME | End: 2021-05-10
Attending: PHYSICIAN ASSISTANT

## 2021-05-14 ENCOUNTER — OFFICE VISIT (OUTPATIENT)
Dept: ORTHOPEDIC SURGERY | Facility: CLINIC | Age: 71
End: 2021-05-14

## 2021-05-14 VITALS — TEMPERATURE: 96.9 F | HEIGHT: 75 IN | BODY MASS INDEX: 30.46 KG/M2 | WEIGHT: 245 LBS

## 2021-05-14 DIAGNOSIS — M19.032 OSTEOARTHRITIS OF LEFT WRIST, UNSPECIFIED OSTEOARTHRITIS TYPE: ICD-10-CM

## 2021-05-14 DIAGNOSIS — S63.8X2A TEAR OF LEFT SCAPHOLUNATE LIGAMENT, INITIAL ENCOUNTER: Primary | ICD-10-CM

## 2021-05-14 PROCEDURE — 99214 OFFICE O/P EST MOD 30 MIN: CPT | Performed by: PHYSICIAN ASSISTANT

## 2021-05-14 RX ORDER — MELOXICAM 15 MG/1
15 TABLET ORAL DAILY PRN
COMMUNITY
Start: 2021-04-23 | End: 2021-11-09

## 2021-05-14 RX ORDER — GABAPENTIN 600 MG/1
600 TABLET ORAL 3 TIMES DAILY
COMMUNITY
Start: 2021-04-21 | End: 2023-03-01

## 2021-05-14 NOTE — PROGRESS NOTES
"Chief Complaint  Follow-up and Pain of the Left Wrist    Subjective    History of Present Illness      Ramirez Baltazar is a 70 y.o. male who presents to [facility] for complaint of left wrist swelling and pain. He reports it started several years ago and was told it was a ganglion cyst. He states it has continued to grow in size and he is now experiencing pain sharp pains in the wrist and over thenar eminence. He denies injury. Pain with increased activity and improved with rest.        Objective   Vital Signs:   Temp 96.9 °F (36.1 °C)   Ht 190.5 cm (75\")   Wt 111 kg (245 lb)   BMI 30.62 kg/m²     Physical Exam  Vitals signs and nursing note reviewed.   Constitutional:       Appearance: Normal appearance.   Pulmonary:      Effort: Pulmonary effort is normal.   Skin:     General: Skin is warm and dry.      Capillary Refill: Capillary refill takes less than 2 seconds.   Neurological:      General: No focal deficit present.      Mental Status: He is alert and oriented to person, place, and time. Mental status is at baseline.   Psychiatric:         Mood and Affect: Mood normal.         Behavior: Behavior normal.         Thought Content: Thought content normal.         Judgment: Judgment normal.     Ortho Exam   LEFT wrist-Dorsal wrist  The patient is right hand dominant.   There is an area of diffuse swelling, possible cystic lesion, measuring 7.5cm x 2.5cm.   Lesion located on the dorsum on the wrist, at radial aspect, and into the volar aspect.  There are no abnormal pulsations or bruits.   Neurovascular status is intact. Median nerve function is well preserved.   Volar and dorsiflexion of the wrist make the mass slightly more prominent.   Local tenderness is diffuse and ill-defined and there is an aching sensation upon direct palpation of the wrist.   Radial artery pulses are palpable. Median and ulnar nerve functions are both well preserved.   There is no evidence of erosive " changes into the underlying bone and the symptoms on palpation are essentially in the soft tissues.       Result Review :   Radiologic studies - see below for interpretation  Reviewed MRI report of Left wrist without contrast, performed at  [facility] on 5/10/2021, summary of impression below:   · There is mild soft tissue edema at the dorsal radial aspect of wrist and adjacent to ulnar styloid  · Nerves are unremarkable  · Degenerative subcortical cystic change in the ulnar styloid, distal radius and carpal bones  · Multifocal generative joint disease at wrist and carpus with most severe arthritis being at the radial aspect of the radiocarpal joint and STT joint  · Scapholunate interval is widened  · Small joint effusions the distal radial ulnar joint and radiocarpal joint  · Full-thickness tears of dorsal and interosseous components of the scapholunate ligament and partial thickness tear of the volar component of the scapholunate ligament. Mild proximal migration of the capitate.  Findings suggest developing SLAC wrist  · Synovial thickening and moderate fluid distending into the extensor carpi radialis longus, extensor carpi radialis brevis, extensor pollicis longus tendon sheaths        Assessment   Assessment and Plan    Problem List Items Addressed This Visit        Musculoskeletal and Injuries    Osteoarthritis of left wrist    Relevant Orders    Ambulatory Referral to Hand Surgery (Completed)    Tear of left scapholunate ligament - Primary    Relevant Orders    Ambulatory Referral to Hand Surgery (Completed)          Follow Up   · Discussion of any imaging in detail. Discussion of orthopaedic goals.  · Risk, benefits, and merits of treatment alternatives reviewed with the patient.   · Ice, heat, and/or modalities as beneficial  · Referral to Dr. Finch  · Patient is encouraged to call or return for any issues or concerns.  · Follow up will be based on when MRI has been  performed  • Patient was given instructions and counseling regarding his condition or for health maintenance advice. Please see specific information pulled into the AVS if appropriate.     Danial Hill PA-C   Date of Encounter: 5/14/2021   Electronically signed by Danial Hill PA-C, 05/14/21, 1:31 PM EDT.     EMR Dragon/Transcription disclaimer:  Much of this encounter note is an electronic transcription/translation of spoken language to printed text. The electronic translation of spoken language may permit erroneous, or at times, nonsensical words or phrases to be inadvertently transcribed; Although I have reviewed the note for such errors, some may still exist.

## 2021-05-15 VITALS
HEIGHT: 75 IN | BODY MASS INDEX: 30.26 KG/M2 | DIASTOLIC BLOOD PRESSURE: 82 MMHG | HEART RATE: 82 BPM | SYSTOLIC BLOOD PRESSURE: 144 MMHG | WEIGHT: 243.37 LBS

## 2021-05-16 VITALS
HEART RATE: 73 BPM | SYSTOLIC BLOOD PRESSURE: 133 MMHG | TEMPERATURE: 98.8 F | WEIGHT: 242 LBS | BODY MASS INDEX: 30.09 KG/M2 | HEIGHT: 75 IN | DIASTOLIC BLOOD PRESSURE: 75 MMHG

## 2021-05-16 VITALS
TEMPERATURE: 98.4 F | HEART RATE: 67 BPM | BODY MASS INDEX: 30.46 KG/M2 | WEIGHT: 245 LBS | SYSTOLIC BLOOD PRESSURE: 129 MMHG | HEIGHT: 75 IN | DIASTOLIC BLOOD PRESSURE: 72 MMHG

## 2021-05-16 VITALS — TEMPERATURE: 97.5 F | BODY MASS INDEX: 28.47 KG/M2 | HEIGHT: 75 IN | WEIGHT: 229 LBS

## 2021-05-18 NOTE — PROGRESS NOTES
Ramirez Baltazar 1950     Office/Outpatient Visit    Visit Date: Wed, Sep 26, 2018 10:57 am    Provider: Paris Davis N.P. (Assistant: Sarah Spurling, MA)    Location: [facility]        Electronically signed by Paris Davis N.P. on  09/26/2018 09:00:57 PM                             SUBJECTIVE:        CC:     Mr. Baltazar is a 67 year old White male.  This is a follow-up visit.  Med refills.          HPI:         Patient presents with hTN.  His current cardiac medication regimen includes an ACE inhibitor ( lisinopril ).  He did not bring his blood pressure diary, but says that pressures have been well controlled.  He is tolerating the medication well without side effects.  Compliance with treatment has been good; he takes his medication as directed.          Additionally, he presents with history of depression responsive to treatment.  doing well on zoloft.  denies side effects requests refills.  usually sees dr cunha.  states pharmacy told him he was due follow up.          Additionally, he presents with history of elevated cholesterol.  current treatment includes a low cholesterol/low fat diet.  Compliance with treatment has been good.  He denies experiencing any hypercholesterolemia related symptoms.      ROS:     CONSTITUTIONAL:  Negative for chills, fatigue, fever, and weight change.      CARDIOVASCULAR:  Negative for chest pain, palpitations, tachycardia, orthopnea, and edema.      MUSCULOSKELETAL:  Negative for arthralgias, back pain, and myalgias.      NEUROLOGICAL:  Negative for dizziness, headaches, paresthesias, and weakness.      ENDOCRINE:  Negative for hair loss, heat/cold intolerance, polydipsia, and polyphagia.      PSYCHIATRIC:  Positive for depression ( (stable) ).   Negative for anxiety, sleep disturbance or suicidal thoughts.          PMH/FMH/SH:     Last Reviewed on 4/09/2018 08:58 AM by Natanael Cunha    Past Medical History:             PAST MEDICAL HISTORY              CURRENT MEDICAL PROVIDERS:    Orthopedist Pain management Vascular surgeon             ADVANCED DIRECTIVES: None         Surgical History:         Positive for    Vasectomy;     Positive for    Colonoscopy ( 2014-NEG;; );         Family History:         Positive for Breast Cancer and Lung Cancer.      Sister(s): Breast Cancer         Social History:     Occupation: CONSTRUCTION WORK;;;     Marital Status:          Tobacco/Alcohol/Supplements:     Last Reviewed on 6/01/2018 11:50 AM by Mónica Hook    Tobacco: He has a past history of cigarette smoking; quit date:  1996.          Alcohol: Frequency:    RARE;     Caffeine:  He admits to consuming caffeine via coffee ( 3 servings per day ).          Substance Abuse History:     Last Reviewed on 4/09/2018 08:56 AM by Natanael Cunha    NEGATIVE             Current Problems:     Last Reviewed on 7/21/2017 02:19 PM by Natanael Cunha    AAA (Vascular)     Chronic neck pain     Elevated cholesterol     Hearing difficulties     Chronic low back pain     HTN     Depression responsive to treatment     Postoperative urethral stricture     Screening for prostate cancer         Immunizations:     zzPrevnar-13 5/26/2016     Fluzone High-Dose pf (>=65 yr) 11/29/2017     PNEUMOVAX 23 (Pneumococcal PPV23) 6/12/2017         Allergies:     Last Reviewed on 6/01/2018 11:46 AM by Mónica Hook    Cats:    dogs:        Current Medications:     Last Reviewed on 9/26/2018 10:59 AM by Spurling, Sarah C    Lisinopril 20mg Tablet 1 tab daily     Zoloft 50mg Tablet 1 a day     Diclofenac Sodium 75mg Tablets, Enteric Coated 1 tab bid with food     Fluocinonide 0.05% Cream Apply thin film to affected area bid prn     Cyclobenzaprine HCl 10mg Tablet 1 tablet AT HS PRN     Gabapentin 300mg Capsules One capsulePO Q 6 hours     Hydrocodone/Acetaminophen 10mg/325mg Tablet 1 tab every 8 hours prn     potassium     ginko daily     vitamin e daily     vitamin b   complex daily     Vitamin C     vitamin d         OBJECTIVE:        Vitals:         Historical:     06/01/2018  BP:   111/67 mm Hg ( (left arm, , sitting, );)     06/01/2018  Wt:   239.6lbs        Current: 9/26/2018 11:00:12 AM    Ht:  6 ft, 1.5 in;  Wt: 236.8 lbs;  BMI: 30.8    T: 98.4 F (oral);  BP: 104/63 mm Hg (left arm, sitting);  P: 67 bpm (left arm (BP Cuff), sitting);  sCr: 0.9 mg/dL;  GFR: 90.13        Exams:     PHYSICAL EXAM:     GENERAL:  well developed and nourished; appropriately groomed; in no apparent distress;     RESPIRATORY: normal respiratory rate and pattern with no distress; normal breath sounds with no rales, rhonchi, wheezes or rubs;     CARDIOVASCULAR: normal rate; rhythm is regular;  no edema;     LYMPHATIC: no enlargement of cervical or facial nodes;     MUSCULOSKELETAL:  Normal range of motion, strength and tone;     NEUROLOGIC: mental status: alert and oriented x 3; GROSSLY INTACT     PSYCHIATRIC:  appropriate affect and demeanor; normal speech pattern; grossly normal memory;         Procedures:     Influenza vaccination     1. Influenza high dose 0.5 ml unit dose, ABN signed given IM in the left upper arm; administered by [facility];  lot number [id number]; expires 04/29/19 Regarding contraindications to an Influenza vaccine: Denies moderate/severe illness with/without fever; serious reaction to eggs, egg proteins, gentamicin, gelatin, arginine, neomycin or polymixin; serious reaction after recieving previous influenza vaccines; and history of Guillain-[place] Syndrome.              ASSESSMENT           401.1   I10  HTN              DDx:     296.25   F32.89  Depression responsive to treatment              DDx:     272.0   E78.1  Elevated cholesterol              DDx:     V04.81   Z23  Influenza vaccination              DDx:         ORDERS:         Meds Prescribed:       Refill of: Lisinopril 20mg Tablet 1 tab daily  #90 (Ninety) tablet(s) Refills: 1       Refill of: Zoloft (Sertraline HCl) 50mg  Tablet 1 a day  #90 (Ninety) tablet(s) Refills: 1         Lab Orders:       [id number]  [facility] Comp. Metabolic Panel  (Send-Out)           Procedures Ordered:       07236  Fluzone High Dose  (In-House)         47885  Immunization administration; one vaccine  (In-House)                   PLAN:          HTN     LABORATORY:  Labs ordered to be performed today include Comprehensive metabolic panel.      RECOMMENDATIONS given include: avoid pseudoephedrine or other stimulants/decongestants in common cold remedies, decrease consumption of alcohol, perform routine monitoring of blood pressure with home blood pressure cuff, have spouse or friend monitor for loud snoring/sleep apnea, exercise, reduction of dietary salt intake, and Advised about free BP checks on the last Saturday of each month.            Prescriptions:       Refill of: Lisinopril 20mg Tablet 1 tab daily  #90 (Ninety) tablet(s) Refills: 1           Orders:       94851  [facility] Comp. Metabolic Panel  (Send-Out)            Depression responsive to treatment           Prescriptions:       Refill of: Zoloft (Sertraline HCl) 50mg Tablet 1 a day  #90 (Ninety) tablet(s) Refills: 1          Elevated cholesterol         RECOMMENDATIONS given include: alcohol avoidance, exercise, low cholesterol/low fat diet, and advise medicare wellness exam with pcp for next office visit..           Influenza vaccination           Orders:       [id number]  Fluzone High Dose  (In-House)         14821  Immunization administration; one vaccine  (In-House)               Patient Recommendations:        For  HTN:     Certain decongestants and stimulants (like pseudoephedrine) in common cold remedies raise blood pressure, sometimes to dangerously high levels. Never take with MAO inhibitors! Avoid alcohol as it can contribute to elevated blood pressure. Begin monitoring your blood pressure by brief nurse visits at our office, a home blood pressure monitor, or by checking on the machines in  pharmacies or stores.  Keep a log of the readings. Obstructive sleep apnea is much more prevalent than historically reported and is a greatly under recognized cause of hypertension. If you have loud snoring, if you wake up tired and feel tired thru out the day, you may have sleep apnea.  One way to suspect this is if a loved one reports that you snore very loudly or if you seem to quit breathing for a time while you are asleep.  If this describes you, you should consult your doctor about have a sleep study performed. Maintain a regular exercise program. Reduce the amount of salt in your diet.          For  Elevated cholesterol:     Avoid alcohol as it can contribute to elevated blood pressure. Maintain a regular exercise program. Reduce the amount of cholesterol and saturated fat in your diet.              CHARGE CAPTURE           **Please note: ICD descriptions below are intended for billing purposes only and may not represent clinical diagnoses**        Primary Diagnosis:         401.1 HTN            I10    Essential (primary) hypertension              Orders:          86490   Office/outpatient visit; established patient, level 4  (In-House)           296.25 Depression responsive to treatment            F32.89    Other specified depressive episodes    272.0 Elevated cholesterol            E78.1    Pure hyperglyceridemia    V04.81 Influenza vaccination            Z23    Encounter for immunization              Orders:          75266   Fluzone High Dose  (In-House)             43156   Immunization administration; one vaccine  (In-House)

## 2021-05-18 NOTE — PROGRESS NOTES
"Ramirez Baltazar 1950     Office/Outpatient Visit    Visit Date: Wed, Aug 21, 2019 03:07 pm    Provider: Natanael Cunha MD (Assistant: Orly Pulliam MA)    Location: [facility]        Electronically signed by Natanael Cunha MD on  08/21/2019 06:28:34 PM                             SUBJECTIVE:        CC:     Mr. Baltazar is a 68 year old White male.  ER f/u on [place] 8/19/19, blacking out, low bp, feels like pressure on top of head PT STATES HE DID NOT TAKE LISINOPRIL TODAY         HPI:         Mr. Baltazar presents with hTN.  This was first diagnosed several years ago.  His current cardiac medication regimen includes an ACE inhibitor ( lisinopril ).  He has not kept a blood pressure diary, but states that typical readings show BORDERLINE LOW.  He has been experiencing possible adverse medication effects, including dizziness.  Compliance with treatment has been good; he takes his medication as directed.  HAD AN EPISODE OF LOW BP, NEG EVAL IN THE ER BUT HELD MED TODAY.      ROS:     CONSTITUTIONAL:  Negative for chills and fever.      E/N/T:  Negative for ear pain, nasal congestion and sore throat.      CARDIOVASCULAR:  Positive for chest pain ( HAD \"PRESSURE\" DUREING THE EPISODE OF LOW BP. ).   Negative for palpitations or pedal edema.      RESPIRATORY:  Negative for recent cough and dyspnea.      GASTROINTESTINAL:  Negative for abdominal pain, anorexia, nausea and vomiting.      MUSCULOSKELETAL:  Positive for arthralgias, (RIGHT SHOULDER, NSAIDS HELP) back pain ( chronic ) and ALSO CHRONIC NECK PAIN.      NEUROLOGICAL:  Negative for fainting and headaches.      ENDOCRINE:  Negative for excessive sweating.          PMH/FMH/SH:     Last Reviewed on 8/21/2019 03:33 PM by Natanael Cunha    Past Medical History:             PAST MEDICAL HISTORY             CURRENT MEDICAL PROVIDERS:    Orthopedist Pain management Vascular surgeon             ADVANCED DIRECTIVES: None         Surgical " History:         Positive for    Vasectomy;     Positive for    Colonoscopy ( 2014-NEG;; );         Family History:         Positive for Breast Cancer and Lung Cancer.      Sister(s): Breast Cancer         Social History:     Occupation: CONSTRUCTION WORK;;;     Marital Status:          Tobacco/Alcohol/Supplements:     Last Reviewed on 8/21/2019 03:32 PM by Natanael Cunha    Tobacco: He has a past history of cigarette smoking; quit date:  1996.          Alcohol: Frequency:    RARE;     Caffeine:  He admits to consuming caffeine via coffee ( 3 servings per day ).          Substance Abuse History:     Last Reviewed on 8/21/2019 03:32 PM by Natanael Cunha    NEGATIVE             Current Problems:     Last Reviewed on 8/21/2019 03:35 PM by Natanael Cunha    AAA (Vascular)     Chronic neck pain     Elevated cholesterol     Hearing difficulties     Chronic low back pain     HTN     Depression responsive to treatment         Immunizations:     zzPrevnar-13 5/26/2016     Fluzone High-Dose pf (>=65 yr) 11/29/2017     Fluzone High-Dose pf (>=65 yr) 9/26/2018     PNEUMOVAX 23 (Pneumococcal PPV23) 6/12/2017         Allergies:     Last Reviewed on 8/21/2019 03:32 PM by Natanael Cunha    Cats:    dogs:        Current Medications:     Last Reviewed on 8/21/2019 03:34 PM by Natanael Cunha    Lisinopril 20mg Tablet 1 tab daily     Zoloft 50mg Tablet 1 a day     Diclofenac Sodium 75mg Tablets, Enteric Coated 1 tab bid with food     Cyclobenzaprine HCl 10mg Tablet 1 tablet AT HS PRN     Gabapentin 300mg Capsules One capsulePO Q 6 hours     Hydrocodone/Acetaminophen 10mg/325mg Tablet 1 tab every 8 hours prn     potassium     vitamin e daily     vitamin b  complex daily     Vitamin C     vitamin d         OBJECTIVE:        Vitals:         Current: 8/21/2019 3:22:16 PM    Ht:  6 ft, 1.5 in;  Wt: 236.2 lbs;  BMI: 30.7    T: 83 F (oral);  BP: 158/82 mm Hg (left arm, sitting);  P: 83 bpm  (left arm (BP Cuff), sitting);  sCr: 0.92 mg/dL;  GFR: 86.91        Exams:     PHYSICAL EXAM:     GENERAL:  well developed and nourished; appropriately groomed; in no apparent distress;     NECK: trachea is midline; thyroid is non-palpable;     RESPIRATORY: normal respiratory rate and pattern with no distress; normal breath sounds with no rales, rhonchi, wheezes or rubs;     CARDIOVASCULAR: normal rate; rhythm is regular;  no edema;     LYMPHATIC: no enlargement of cervical or facial nodes; no supraclavicular nodes;     MUSCULOSKELETAL: normal gait; normal overall tone     NEUROLOGIC: mental status: alert and oriented x 3; GROSSLY INTACT     PSYCHIATRIC:  appropriate affect and demeanor; normal speech pattern; grossly normal memory;         ASSESSMENT           401.1   I10  HTN              DDx:     786.51   R07.2  Precordial chest pain              DDx:         ORDERS:         Radiology/Test Orders:       [id number]  Cardiovascular stress test using maximal or submaximal treadmill or bicycle exercise, continuous jess  (Send-Out)           Other Orders:       [id number]  Queried Patient for Tobacco Use  (Send-Out)                   PLAN:          HTN         MEDICATIONS: I will change the dose of his an ACEI.      RECOMMENDATIONS given include: perform routine monitoring of blood pressure with home blood pressure cuff, reduction of dietary salt intake, and BREAK ACI IN HALF.      FOLLOW-UP: Schedule a follow-up visit in 3 months. [facility]          Precordial chest pain         LABORATORY:  Lab tests ordered today include LABS FROM ER REVIEWED.      TESTS/PROCEDURES ordered today include treadmill stress test and STRESS CARDIOLITE AT FLAGET (HIS PREFERENCE).      MEDICATIONS: (no change to current medication regimen)     FOLLOW-UP: Schedule follow-up appointments on a p.r.n. basis.      Smoking Status:  Nonsmoker           Orders:       [id number]  Cardiovascular stress test using maximal or submaximal treadmill or bicycle exercise,  continuous jess  (Send-Out)         [id number]  Queried Patient for Tobacco Use  (Send-Out)               Patient Recommendations:        For  HTN:     Begin monitoring your blood pressure by brief nurse visits at our office, a home blood pressure monitor, or by checking on the machines in pharmacies or stores.  Keep a log of the readings. Reduce the amount of salt in your diet.  Schedule a follow-up visit in 3 months.          For  Precordial chest pain:     Schedule follow-up appointments as needed.              CHARGE CAPTURE           **Please note: ICD descriptions below are intended for billing purposes only and may not represent clinical diagnoses**        Primary Diagnosis:         401.1 HTN            I10    Essential (primary) hypertension              Orders:          31015   Office/outpatient visit; established patient, level 4  (In-House)           786.51 Precordial chest pain            R07.2    Precordial pain        ADDENDUMS:      ____________________________________    Addendum: 08/27/2019 02:11 PM - Brenda Curry         Visit Note Faxed to:        User Entered Recipient; Number [phone]

## 2021-05-18 NOTE — PROGRESS NOTES
Ramirez Baltazar  1950     Office/Outpatient Visit    Visit Date: Mon, Apr 20, 2020 11:15 am    Provider: Gemma Cunha MD (Assistant: Spurling, Sarah C, MA)    Location: [facility]        Electronically signed by Gemma Cunha MD on  04/20/2020 05:48:27 PM                             Subjective:        CC: Mr. Baltazar is a 69 year old White male.  This is a follow-up visit.  [phone]  PRESENT:  EGMMA MEDEL MD         HPI:       (Improving)     Patient to be evaluated for essential (primary) hypertension.  This was first diagnosed several years ago.  He is not currently taking an antihypertensive.  Compliance with treatment has been good; he takes his medication as directed.  He has not kept a blood pressure diary, but states that pressures have been well controlled.  HAS DONE WILL WITHOUT MEDS           Dx with pure hyperglyceridemia-mild; current treatment includes a low cholesterol/low fat diet.  Compliance with treatment has been fair.  Most recent lab tests include Total Cholesterol:  214 (mg/dL) (05/02/2019), HDL:  40 (mg/dL) (05/02/2019), Triglycerides:  177 (mg/dL) (05/02/2019), LDL:  139 (mg/dL) (05/02/2019).            With regard to the cervicalgia-chronic, the location of discomfort is posterior and superior.  It radiates to the scalp.  The pain is characterized as moderate in intensity, intermittent, and dull.  Initial onset was several weeks ago.  Medical history is pertinent for ON CHRONIC MEDS FROM PAIN MGMT.  STABLE ON CURRENT MEDS           Dx with dysthymic disorder; the diagnosis of depression was made several years ago.  Current medications include an antidepressant.  DOING WELL ON CURRENT MEDS     ROS:     CONSTITUTIONAL:  Negative for chills, fatigue and fever.      E/N/T:  Negative for ear pain, nasal congestion and sore throat.      CARDIOVASCULAR:  Negative for chest pain ( NO FURTHER CHEST PAIN, NEG CAR W/U ),  palpitations and pedal edema.      RESPIRATORY:  Negative for recent cough and dyspnea.      GASTROINTESTINAL:  Negative for abdominal pain, anorexia, constipation, diarrhea, nausea and vomiting.      GENITOURINARY:  Negative for dysuria and hematuria.      MUSCULOSKELETAL:  Positive for arthralgias and (RIGHT SHOULDER, NSAIDS HELP) back pain ( chronic; NOW FOLLOWED BY PAIN MGMT ).      NEUROLOGICAL:  Negative for headaches and weakness.          Current Problems:     Last Reviewed on 4/20/2020 11:15 AM by Spurling, Sarah C    Essential (primary) hypertension    Pure hyperglyceridemia-mild    Cervicalgia-chronic    Abdominal aortic aneurysm, without rupture    Dysthymic disorder        Immunizations:     zzPrevnar-13 5/26/2016    Fluzone High-Dose pf (>=65 yr) 11/29/2017    Fluzone High-Dose pf (>=65 yr) 9/26/2018    Fluzone High-Dose pf (>=65 yr) 10/28/2019    PNEUMOVAX 23 (Pneumococcal PPV23) 6/12/2017        Allergies:     Last Reviewed on 4/20/2020 11:15 AM by Spurling, Sarah C    Cats:      dogs:          Current Medications:     Last Reviewed on 4/20/2020 11:16 AM by Spurling, Sarah C    HYDROcodone-acetaminophen  mg oral tablet [1 tab every 8 hours prn]    Gabapentin 300 mg oral capsule [One capsulePO Q 6 hours]    Diclofenac Sodium 75mg Tablets, Enteric Coated [1 tab bid with food]    cyclobenzaprine 10 mg oral tablet [ 1 tablet AT HS PRN ]    vitamin d     Endur-C with dheeraj hips     vitamin b  complex daily     vitamin e daily     potassium         Assessment:         I10   Essential (primary) hypertension   (Improving)     E78.1   Pure hyperglyceridemia-mild       M54.2   Cervicalgia-chronic       F34.1   Dysthymic disorder           ORDERS:         Meds Prescribed:       [Refilled] Diclofenac Sodium 75 mg oral tablet, delayed release (enteric coated) [1 tab bid with food], #180 (one hundred and eighty) tablets, Refills: 1 (one)       [Refilled] Zoloft 50 mg oral tablet [1 a day], #90 (ninety)  tablets, Refills: 1 (one)                 Plan:         Essential (primary) hypertension        RECOMMENDATIONS given include: perform routine monitoring of blood pressure with home blood pressure cuff.      FOLLOW-UP: Schedule a follow-up visit in 6 months.      OBSERVE AS IMPROVED MIPS Vaccines Flu and Pneumonia updated in Shot record Telehealth: Verbal consent obtained for visit to occur via phone call; Total time spent was 5:25 minutes; 39736--Yefjqbkqs E/M 5-10 minutes         Pure hyperglyceridemia-mild        RECOMMENDATIONS given include: alcohol avoidance, exercise, low cholesterol/low fat diet, and advise medicare wellness exam with pcp for next office visit..          Cervicalgia-chronic          Prescriptions:       [Refilled] Diclofenac Sodium 75 mg oral tablet, delayed release (enteric coated) [1 tab bid with food], #180 (one hundred and eighty) tablets, Refills: 1 (one)         Dysthymic disorder          Prescriptions:       [Refilled] Zoloft 50 mg oral tablet [1 a day], #90 (ninety) tablets, Refills: 1 (one)             Patient Recommendations:        For  Essential (primary) hypertension:    Begin monitoring your blood pressure by brief nurse visits at our office, a home blood pressure monitor, or by checking on the machines in pharmacies or stores.  Keep a log of the readings.  Schedule a follow-up visit in 6 months.          For  Pure hyperglyceridemia-mild:    Avoid alcohol as it can contribute to elevated blood pressure. Maintain a regular exercise program. Reduce the amount of cholesterol and saturated fat in your diet.              Charge Capture:         Primary Diagnosis:     I10  Essential (primary) hypertension           Orders:      [id number]  Phys/QHP telephone evaluation 5-10 min  (In-House)              E78.1  Pure hyperglyceridemia-mild     M54.2  Cervicalgia-chronic     F34.1  Dysthymic disorder

## 2021-05-18 NOTE — PROGRESS NOTES
Ramirez Baltazar  1950     Office/Outpatient Visit    Visit Date: Mon, Aug 3, 2020 11:15 am    Provider: Natanael Cunha MD (Assistant: Adry Manriquez LPN)    Location: [facility]        Electronically signed by Natanael Cunha MD on  08/03/2020 04:03:16 PM                             Subjective:        CC: Mr. Baltazar is a 69 year old White male.  This is a follow-up visit.  Discuss AAA         HPI:           Patient presents with pure hyperglyceridemia-mild.  Current treatment includes a low cholesterol/low fat diet.  Compliance with treatment has been fair.  Most recent lab tests include Total Cholesterol:  214 (mg/dL) (05/02/2019), HDL:  40 (mg/dL) (05/02/2019), Triglycerides:  177 (mg/dL) (05/02/2019), LDL:  139 (mg/dL) (05/02/2019).            In regard to the dysthymic disorder, the diagnosis of depression was made several years ago.  Current medications include an antidepressant.  DOING WELL ON CURRENT MEDS           Complaint of abdominal aortic aneurysm, without rupture..  The symptom began years ago.  The severity is of moderate intensity.  NEEDS F/U APPT WITH VASCULAR     ROS:     CONSTITUTIONAL:  Negative for chills, fatigue and fever.      E/N/T:  Negative for ear pain, nasal congestion and sore throat.      CARDIOVASCULAR:  Negative for chest pain ( NO FURTHER CHEST PAIN, NEG CAR W/U ), palpitations and pedal edema.      RESPIRATORY:  Negative for recent cough and dyspnea.      GASTROINTESTINAL:  Negative for abdominal pain, anorexia, constipation, diarrhea, nausea and vomiting.      GENITOURINARY:  Negative for dysuria and hematuria.      MUSCULOSKELETAL:  Positive for arthralgias and (RIGHT SHOULDER, NSAIDS HELP) back pain ( chronic; NOW FOLLOWED BY PAIN MGMT ).      NEUROLOGICAL:  Negative for headaches and weakness.          Past Medical History / Family History / Social History:         Last Reviewed on 8/03/2020 11:45 AM by Natanael Cunha    Past Medical  History:             PAST MEDICAL HISTORY             CURRENT MEDICAL PROVIDERS:    Orthopedist Pain management Vascular surgeon             ADVANCED DIRECTIVES: None         PREVENTIVE HEALTH MAINTENANCE             EVALUATION FOR AORTIC ANEURYSM: was last done 2017 with the following abnormalaties noted-- AAA, FOLLOWED BY VASCULAR     COLORECTAL CANCER SCREENING: Up to date (colonoscopy q10y; sigmoidoscopy q5y; Cologuard q3y) was last done 2014; colonoscopy with normal results; RESULTS NOT IN CHART     DENTAL CLEANING: RECOMMENDED 10/28/19     EYE EXAM: was last done 2019     PSA: was last done 4/9/18 with normal results         Surgical History:         Positive for    Joint Replacement:    Shoulder Replacement: LEFT;;; ; and    Vasectomy;         Family History:         Positive for Breast Cancer and Lung Cancer.      Sister(s): Breast Cancer         Social History:     Occupation: CONSTRUCTION WORK;;;     Marital Status:          Tobacco/Alcohol/Supplements:     Last Reviewed on 8/03/2020 11:45 AM by Natanael Cunha    Tobacco: He has a past history of cigarette smoking; quit date:  1996.          Alcohol: Frequency:    RARE;     Caffeine:  He admits to consuming caffeine via coffee ( 3 servings per day ).          Substance Abuse History:     Last Reviewed on 8/03/2020 11:45 AM by Natanael Cunha    NEGATIVE         Current Problems:     Last Reviewed on 8/03/2020 11:45 AM by Natanael Cunha    Chronic low back pain    Hearing difficulties    Pure hyperglyceridemia-mild    Cervicalgia-chronic    Abdominal aortic aneurysm, without rupture    Dysthymic disorder        Immunizations:     zzPrevnar-13 5/26/2016    Fluzone High-Dose pf (>=65 yr) 11/29/2017    Fluzone High-Dose pf (>=65 yr) 9/26/2018    Fluzone High-Dose pf (>=65 yr) 10/28/2019    PNEUMOVAX 23 (Pneumococcal PPV23) 6/12/2017        Allergies:     Last Reviewed on 8/03/2020 11:45 AM by Natanael Cunha    Cats:       dogs:          Current Medications:     Last Reviewed on 8/03/2020 11:45 AM by Natanael Cunha    HYDROcodone-acetaminophen  mg oral tablet [1 tab every 8 hours prn]    Zoloft 50 mg oral tablet [1 a day]    cyclobenzaprine 10 mg oral tablet [ 1 tablet AT HS PRN ]    vitamin d     Endur-C with dheeraj hips     vitamin b  complex daily     vitamin e daily     potassium     gabapentin 600 mg oral tablet [take 1 tablet (600 mg) by oral route 3 times per day]    meloxicam 15 mg oral tablet [take 1 tablet (15 mg) by oral route once daily]        Objective:        Vitals:         Current: 8/3/2020 11:24:03 AM    Ht:  6 ft, 1.5 in;  Wt: 245.6 lbs;  BMI: 32.0T: 95.6 F (oral);  BP: 150/77 mm Hg (left arm, sitting);  P: 70 bpm (left arm (BP Cuff), sitting);  sCr: 0.92 mg/dL;  GFR: 87.18        Exams:     PHYSICAL EXAM:     GENERAL: Vitals recorded well developed, well nourished;  well groomed;  no apparent distress;     NECK: trachea is midline; thyroid is non-palpable;     RESPIRATORY: normal respiratory rate and pattern with no distress; normal breath sounds with no rales, rhonchi, wheezes or rubs;     CARDIOVASCULAR: normal rate; rhythm is regular;  no systolic murmur; no edema;     GASTROINTESTINAL: nontender;     LYMPHATIC: no enlargement of cervical or facial nodes; no supraclavicular nodes;     NEUROLOGIC: GROSSLY INTACT     PSYCHIATRIC:  appropriate affect and demeanor; normal speech pattern; grossly normal memory;         Assessment:         E78.1   Pure hyperglyceridemia-mild       F34.1   Dysthymic disorder       Z12.5   Encounter for screening for malignant neoplasm of prostate       Z11.59   Encounter for screening for other viral diseases       I71.4   Abdominal aortic aneurysm, without rupture           ORDERS:         Lab Orders:       38887  BDCB2 - [facility] CBC w/o diff  (Send-Out)            19132  COMP - [facility] Comp. Metabolic Panel  (Send-Out)            30667  LPDP - [facility] Lipid Panel  (Send-Out)             *  [facility] Medicare screening PSA  (Send-Out)              [facility] Hepatitis C AB (Medicare Screen)  (Send-Out)            APPTO  Appointment need  (In-House)              Procedures Ordered:       REFER  Referral to Specialist or Other Facility  (Send-Out)                      Plan:         Pure hyperglyceridemia-mild    LABORATORY:  Labs ordered to be performed today include CBC W/O DIFF, Comprehensive metabolic panel, and lipid panel.  MIPS Vaccines Flu and Pneumonia updated in Shot record     FOLLOW-UP: Schedule a follow-up visit in 6 months.:.:for Medicare Wellness Visit           Orders:       [id number]  BDCB2 [facility] CBC w/o diff  (Send-Out)            51309  COMP [facility] Comp. Metabolic Panel  (Send-Out)            74136  LPDP [facility] Lipid Panel  (Send-Out)            APPTO  Appointment need  (In-House)              Dysthymic disorder        MEDICATIONS: (no change to current medication regimen)         Encounter for screening for malignant neoplasm of prostate    LABORATORY:  Labs ordered to be performed today include PSA Screening Medicare patients.            Orders:       *  [facility] Medicare screening PSA  (Send-Out)              Encounter for screening for other viral diseases    LABORATORY:  Labs ordered to be performed today include Hepatitis C Ab (Medicare Screen).            Orders:         [facility] Hepatitis C AB (Medicare Screen)  (Send-Out)              Abdominal aortic aneurysm, without rupture        REFERRALS:  Referral initiated to Vascular Surgeon [facility].            Orders:       REFER  Referral to Specialist or Other Facility  (Send-Out)                  Patient Recommendations:        For  Pure hyperglyceridemia-mild:    Schedule a follow-up visit in 6 months.                APPOINTMENT INFORMATION:        Monday Tuesday Wednesday Thursday Friday Saturday Sunday            Time:___________________AM  PM   Date:_____________________             Charge Capture:          Primary Diagnosis:     E78.1  Pure hyperglyceridemia-mild           Orders:      09801  Office/outpatient visit; established patient, level 4  (In-House)            APPTO  Appointment need  (In-House)              F34.1  Dysthymic disorder     Z12.5  Encounter for screening for malignant neoplasm of prostate     Z11.59  Encounter for screening for other viral diseases     I71.4  Abdominal aortic aneurysm, without rupture

## 2021-05-18 NOTE — PROGRESS NOTES
"Ramirez Baltazar 1950     Office/Outpatient Visit    Visit Date: Thu, Jan 17, 2019 09:50 am    Provider: Paris Davis N.P. (Assistant: Елена Soriano MA)    Location: [facility]        Electronically signed by Paris Davis N.P. on  01/17/2019 09:59:19 PM                             SUBJECTIVE:        CC:     Mr. Baltazar is a 68 year old White male.  He presents with sore throat, sinus congestion, fever, cough for a month.          HPI:         URI noted.  These have been present for the past 4 weeks.  The symptoms include productive cough, subjective fever,  nasal congestion, nasal discharge and wheezing.  He reports recent exposure to illness from grandson with strep.  He has already tried to relieve the symptoms with mucinex.      ROS:     CONSTITUTIONAL:  Positive for fatigue and fever ( not checked with thermometer ).      E/N/T:  Positive for nasal congestion, frequent rhinorrhea and sore throat.      CARDIOVASCULAR:  Negative for chest pain, palpitations, tachycardia, orthopnea, and edema.      RESPIRATORY:  Positive for persistent cough ( with scant amounts of purulent sputum ) and frequent wheezing.      GASTROINTESTINAL:  Negative for abdominal pain, heartburn, constipation, diarrhea, and stool changes.      MUSCULOSKELETAL:  Negative for arthralgias, back pain, and myalgias.      NEUROLOGICAL:  Positive for headaches ( \"sinus\" ).   Negative for dizziness.          [facility]/:     Last Reviewed on 4/09/2018 08:58 AM by Natanael Cunha    Past Medical History:             PAST MEDICAL HISTORY             CURRENT MEDICAL PROVIDERS:    Orthopedist Pain management Vascular surgeon             ADVANCED DIRECTIVES: None         Surgical History:         Positive for    Vasectomy;     Positive for    Colonoscopy ( 2014-NEG;; );         Family History:         Positive for Breast Cancer and Lung Cancer.      Sister(s): Breast Cancer         Social History:     Occupation: " CONSTRUCTION WORK;;;     Marital Status:          Tobacco/Alcohol/Supplements:     Last Reviewed on 9/26/2018 10:57 AM by Spurling, Sarah C    Tobacco: He has a past history of cigarette smoking; quit date:  1996.          Alcohol: Frequency:    RARE;     Caffeine:  He admits to consuming caffeine via coffee ( 3 servings per day ).          Substance Abuse History:     Last Reviewed on 4/09/2018 08:56 AM by Natanael Cunha    NEGATIVE             Current Problems:     Last Reviewed on 7/21/2017 02:19 PM by Natanael Cunha    AAA (Vascular)     Chronic neck pain     Elevated cholesterol     Hearing difficulties     Chronic low back pain     HTN     Depression responsive to treatment     Postoperative urethral stricture     Screening for prostate cancer         Immunizations:     zzPrevnar-13 5/26/2016     Fluzone High-Dose pf (>=65 yr) 11/29/2017     Fluzone High-Dose pf (>=65 yr) 9/26/2018     PNEUMOVAX 23 (Pneumococcal PPV23) 6/12/2017         Allergies:     Last Reviewed on 9/26/2018 10:57 AM by Spurling, Sarah C    Cats:    dogs:        Current Medications:     Last Reviewed on 1/17/2019 10:11 AM by Елена Soriano    Diclofenac Sodium 75mg Tablets, Enteric Coated 1 tab bid with food     Lisinopril 20mg Tablet 1 tab daily     Zoloft 50mg Tablet 1 a day     Fluocinonide 0.05% Cream Apply thin film to affected area bid prn     Cyclobenzaprine HCl 10mg Tablet 1 tablet AT HS PRN     Gabapentin 300mg Capsules One capsulePO Q 6 hours     Hydrocodone/Acetaminophen 10mg/325mg Tablet 1 tab every 8 hours prn     potassium     ginko daily     vitamin e daily     vitamin b  complex daily     Vitamin C     vitamin d         OBJECTIVE:        Vitals:         Historical:     09/26/2018  BP:   104/63 mm Hg ( (left arm, , sitting, );)     09/26/2018  Wt:   236.8lbs        Current: 1/17/2019 10:13:40 AM    Ht:  6 ft, 1.5 in;  Wt: 244.8 lbs;  BMI: 31.9    T: 98 F (oral);  BP: 103/65 mm Hg (left arm,  sitting);  P: 89 bpm (left arm (BP Cuff), sitting);  sCr: 0.93 mg/dL;  GFR: 87.29    O2 Sat: 98 % (room air)        Exams:     PHYSICAL EXAM:     GENERAL:  well developed and nourished; appropriately groomed; in no apparent distress;     E/N/T: EARS: bilateral TMs are normal;  NOSE: nasal mucosa is erythematous;  bilateral maxillary sinus tenderness present; OROPHARYNX: posterior pharynx, including tonsils, tongue, and uvula are normal;     RESPIRATORY: normal respiratory rate and pattern with no distress; normal breath sounds with no rales, rhonchi, wheezes or rubs;     CARDIOVASCULAR: normal rate; rhythm is regular;     LYMPHATIC: no enlargement of cervical or facial nodes;     MUSCULOSKELETAL:  Normal range of motion, strength and tone;     NEUROLOGIC: mental status: alert and oriented x 3; GROSSLY INTACT     PSYCHIATRIC:  appropriate affect and demeanor; normal speech pattern; grossly normal memory;         Lab/Test Results:             Rapid Strep Screen:  Negative (01/17/2019),     Performed by::  pr (01/17/2019),     Influenza A and B:  Negative (01/17/2019),             ASSESSMENT           461.0   J01.00  Acute sinusitis, maxillary              DDx:         ORDERS:         Meds Prescribed:       Augmentin (Amoxicillin/Clavulanate) 875mg/125mg Tablet 1 po bid with food  #20 (Twenty) tablet(s) Refills: 0         Lab Orders:       [id number]  Infectious agent antigen detection by immunoassay; Influenza  (In-House)         45659  Group A Streptococcus detection by immunoassay with direct optical observation  (In-House)         36789  Infectious agent antigen detection by immunoassay; Influenza  (In-House)         38944  [facility] Throat culture, strep  (Send-Out)                   PLAN:          Acute sinusitis, maxillary         RECOMMENDATIONS given include: rest, increase oral fluid intake, and restart allegra and flonase that he has at home.  follow up if not improving.  he is cocnerned about family hx of  throat cancer in a smoker.  refer to ENT if symptoms do not resolve..            Prescriptions:       Augmentin (Amoxicillin/Clavulanate) 875mg/125mg Tablet 1 po bid with food  #20 (Twenty) tablet(s) Refills: 0           Orders:       [id number]  Infectious agent antigen detection by immunoassay; Influenza  (In-House)         [id number]  Group A Streptococcus detection by immunoassay with direct optical observation  (In-House)         59008  Infectious agent antigen detection by immunoassay; Influenza  (In-House)         95000  [facility] Throat culture, strep  (Send-Out)               Patient Recommendations:        For  Acute sinusitis, maxillary:     Get plenty of rest. Increase oral fluid intake.              CHARGE CAPTURE           **Please note: ICD descriptions below are intended for billing purposes only and may not represent clinical diagnoses**        Primary Diagnosis:         461.0 Acute sinusitis, maxillary            J01.00    Acute maxillary sinusitis, unspecified              Orders:          [id number]   Office/outpatient visit; established patient, level 3  (In-House)             06424 -59  Infectious agent antigen detection by immunoassay; Influenza  (In-House)             13540   Group A Streptococcus detection by immunoassay with direct optical observation  (In-House)             72802   Infectious agent antigen detection by immunoassay; Influenza  (In-House)

## 2021-05-18 NOTE — PROGRESS NOTES
Ramirez Baltazar 1950     Office/Outpatient Visit    Visit Date: Mon, Apr 9, 2018 08:42 am    Provider: Natanael Cunha MD (Assistant: Usha Morrow MA)    Location: [facility]        Electronically signed by Natanael Cunha MD on  04/09/2018 09:40:33 AM                             SUBJECTIVE:        CC:     Mr. Baltazar is a 67 year old White male.  This is a follow-up visit.  CHECK UP FOR MED REFILLS         HPI:         Patient to be evaluated for hTN.  His current cardiac medication regimen includes an ACE inhibitor.  Compliance with treatment has been good.          Elevated cholesterol details; current treatment includes diet.  Compliance with treatment has been fair.  Most recent lab tests include Total Cholesterol:  214 (mg/dL) (05/26/2016), HDL:  43 (mg/dL) (05/26/2016), Triglycerides:  289 (mg/dL) (05/26/2016), LDL:  113 (mg/dL) (05/26/2016).      ROS:     CONSTITUTIONAL:  Negative for chills and fever.      E/N/T:  Negative for ear pain, nasal congestion and sore throat.      CARDIOVASCULAR:  Negative for chest pain, palpitations and pedal edema.      RESPIRATORY:  Negative for recent cough and dyspnea.      GASTROINTESTINAL:  Negative for abdominal pain, anorexia, constipation, diarrhea, nausea and vomiting.      GENITOURINARY:  Positive for decreased force and (HISTORY OF URETHRAL STRICTURE, NEEDS TO SEE UROLOGIST AGIAN.) of urine stream.   Negative for dysuria, hematuria or nocturia.      MUSCULOSKELETAL:  Positive for arthralgias, (RIGHT SHOULDER, NSAIDS HELP) back pain ( chronic ) and ALSO CHRONIC NECK PAIN.          PMH/FMH/SH:     Last Reviewed on 4/09/2018 08:58 AM by Natanael Cunha    Past Medical History:             PAST MEDICAL HISTORY             CURRENT MEDICAL PROVIDERS:    Orthopedist Pain management Vascular surgeon             ADVANCED DIRECTIVES: None         Surgical History:         Positive for    Vasectomy;     Positive for    Colonoscopy ( 2014-NEG;;  );         Family History:         Positive for Breast Cancer and Lung Cancer.      Sister(s): Breast Cancer         Social History:     Occupation: CONSTRUCTION WORK;;;     Marital Status:          Tobacco/Alcohol/Supplements:     Last Reviewed on 4/09/2018 08:57 AM by Natanael Cunha    Tobacco: He has a past history of cigarette smoking; quit date:  1996.          Alcohol: Frequency:    RARE;     Caffeine:  He admits to consuming caffeine via coffee ( 3 servings per day ).          Substance Abuse History:     Last Reviewed on 4/09/2018 08:56 AM by Natanael Cunha    NEGATIVE             Current Problems:     Last Reviewed on 7/21/2017 02:19 PM by Natanael Cunha    AAA (Vascular)     Chronic neck pain     Elevated cholesterol     Hearing difficulties     Chronic low back pain     HTN     Depression responsive to treatment         Immunizations:     zzPrevnar-13 5/26/2016     Fluzone High-Dose pf (>=65 yr) 11/29/2017     PNEUMOVAX 23 (Pneumococcal PPV23) 6/12/2017         Allergies:     Last Reviewed on 4/09/2018 08:58 AM by Natanael Cunha    Cats:    dogs:        Current Medications:     Last Reviewed on 11/29/2017 04:46 PM by Jeanette Curry    Diclofenac Sodium 75mg Tablets, Enteric Coated 1 tab bid with food     Lisinopril 20mg Tablet 1 tab daily     Zoloft 50mg Tablet 1 a day     Cyclobenzaprine HCl 10mg Tablet 1 tablet AT HS PRN     Gabapentin 300mg Capsules One capsulePO Q 6 hours     Hydrocodone/Acetaminophen 10mg/325mg Tablet 1 tab every 8 hours prn     ginko daily     vitamin e daily     vitamin b  complex daily     Vitamin C     vitamin d         OBJECTIVE:        Vitals:         Current: 4/9/2018 8:44:24 AM    Ht:  6 ft, 1.5 in;  Wt: 242.3 lbs;  BMI: 31.5    T: 97.4 F (oral);  BP: 117/73 mm Hg (left arm, sitting);  P: 103 bpm (left arm (BP Cuff), sitting);  sCr: 0.87 mg/dL;  GFR: 94.15        Exams:     PHYSICAL EXAM:     GENERAL: Vitals recorded well developed,  well nourished;  well groomed;  no apparent distress;     NECK: trachea is midline; thyroid is non-palpable;     RESPIRATORY: normal respiratory rate and pattern with no distress; normal breath sounds with no rales, rhonchi, wheezes or rubs;     CARDIOVASCULAR: normal rate; rhythm is regular;  no systolic murmur; no edema;     GASTROINTESTINAL: rectal exam: normal tone; no masses; no hemorrhoids;     GENITOURINARY: prostate:  no nodules, tenderness, or enlargement;     LYMPHATIC: no enlargement of cervical or facial nodes;     NEUROLOGIC: GROSSLY INTACT     PSYCHIATRIC:  appropriate affect and demeanor; normal speech pattern; grossly normal memory;         ASSESSMENT           401.1   I10  HTN              DDx:     272.0   E78.0  Elevated cholesterol              DDx:     V76.44   Z12.5  Screening for prostate cancer              DDx:     598.2   N99.110  Postoperative urethral stricture              DDx:     441.4   I71.4  AAA (Vascular)              DDx:         ORDERS:         Meds Prescribed:       Refill of: Diclofenac Sodium 75mg Tablets, Enteric Coated 1 tab bid with food  #180 (One [place] [address] [place]) tablet(s) Refills: 1       Refill of: Fluocinonide 0.05% Cream Apply thin film to affected area bid prn  #30 (Thirty) gm Refills: 1         Lab Orders:       54801  [facility] CBC with 3 part diff  (Send-Out)         13201  TSH - [facility] TSH  (Send-Out)         52456  [facility] Comp. Metabolic Panel  (Send-Out)         17594  [facility] Lipid Panel  (Send-Out)         *  [facility] Medicare screening PSA  (Send-Out)           Procedures Ordered:       REFER  Referral to Specialist or Other Facility  (Send-Out)         REFER  Referral to Specialist or Other Facility  (Send-Out)                   PLAN:          HTN     LABORATORY:  Labs ordered to be performed today include CBC and TSH.      FOLLOW-UP: Schedule a follow-up visit in 6 months..            Orders:       77512  [facility] CBC with 3 part diff   (Send-Out)         56800  TSH - [facility] TSH  (Send-Out)            Elevated cholesterol     LABORATORY:  Labs ordered to be performed today include Comprehensive metabolic panel and lipid panel.            Orders:       15806  [facility] Comp. Metabolic Panel  (Send-Out)         85682  [facility] Lipid Panel  (Send-Out)            Screening for prostate cancer     LABORATORY:  Labs ordered to be performed today include PSA Screening Medicare patients.            Orders:       *  [facility] Medicare screening PSA  (Send-Out)            Postoperative urethral stricture         REFERRALS:  Referral initiated to a urologist ( Dr Magdy Alcaraz [facility] ).            Orders:       REFER  Referral to Specialist or Other Facility  (Send-Out)            AAA (Vascular)         REFERRALS:  Referral initiated to Vascular Surgeon Dr. Ruma Murdock.            Orders:       REFER  Referral to Specialist or Other Facility  (Send-Out)               Other Prescriptions:       Refill of: Diclofenac Sodium 75mg Tablets, Enteric Coated 1 tab bid with food  #180 (One [place] [address] [place]) tablet(s) Refills: 1       Refill of: Fluocinonide 0.05% Cream Apply thin film to affected area bid prn  #30 (Thirty) gm Refills: 1         Patient Recommendations:        For  HTN:     Schedule a follow-up visit in 6 months.                APPOINTMENT INFORMATION:        Monday Tuesday Wednesday Thursday Friday Saturday Sunday            Time:___________________AM  PM   Date:_____________________             CHARGE CAPTURE           **Please note: ICD descriptions below are intended for billing purposes only and may not represent clinical diagnoses**        Primary Diagnosis:         401.1 HTN            I10    Essential (primary) hypertension              Orders:          96150   Office/outpatient visit; established patient, level 4  (In-House)           272.0 Elevated cholesterol            E78.0    Pure hypercholesterolemia    V76.44 Screening  for prostate cancer            Z12.5    Encounter for screening for malignant neoplasm of prostate    598.2 Postoperative urethral stricture            N99.110    Postprocedural urethral stricture, male, meatal    441.4 AAA (Vascular)            I71.4    Abdominal aortic aneurysm, without rupture        ADDENDUMS:      ____________________________________    Date: 04/10/2018 10:40 AM    Author: Elaina Winter A         Visit Note Faxed to:        Tonja Murdock (Vascular Medicine); Number [phone]            Date: 04/10/2018 10:41 AM    Author: Elaina Winter         Visit Note Faxed to:        Magdy Alcaraz  (Urology); Number [phone]            Addendum: 05/18/2018 10:59 AM - Marissa Martínez Dr[address]s office faxing over note when its signed. AW

## 2021-05-18 NOTE — PROGRESS NOTES
Ramirez Baltazar  1950     Office/Outpatient Visit    Visit Date: Mon, Feb 22, 2021 02:05 pm    Provider: Natanael Cunha MD (Assistant: Linda Sigala,  )    Location: [facility]        Electronically signed by Natanael Cunha MD on  02/22/2021 06:22:14 PM                             Subjective:        CC: Mr. Baltazar is a 70 year old White male.  This is a follow-up visit.  [facility]         HPI:           Mr. Baltazar is here for a Medicare wellness visit.  The required HRA questions are integrated within this visit note. Family medical history and individual medical/surgical history were reviewed and updated.  A current height, weight, BMI, blood pressure, and pulse were recorded in the vitals section of the note and have been reviewed. Patient's medications, including supplements, were recorded in the chart and reviewed.  Current providers and suppliers were reviewed and updated.          Self-Assessment of Health: He rates his health as very good. He rates his confidence of being able to control/manage most of his health problems as very confident. His physical/emotional health has limited his social activites not at all.  A review of cognitive impairment was performed, including ability to drive a car, manage finances, and any memory changes, and was found to be negative.  A review of functional ability, including bathing, dressing, walking, and urine/bowel continence as well as level of safety was performed and was found to be negative.  Falls Risk: Has not had any falls or only one fall without injury in the past year.  In regard to hearing, he reports having trouble hearing the TV/radio when others do not and having to strain to hear or understand conversations, but not wearing hearing aid(s).  Concerning home safety, he reports that at home he DOES have adequate lighting, a skid resistant shower/tub, grab bars in the bath, handrails on stairs and functioning smoke alarms,  "but not absence of throw rugs.          Immunization Status: Age>60, no shingles vaccination; Physical Activity: He exercises for at least 20 minutes 3 or more days/week.; Type of diet patient normally eats is described as well-balanced with fruits and vegetables Tobacco: He has a past history of cigarette smoking; quit date:  1996.  Preventative Health updated today.            PHQ-9 Depression Screening: Completed form scanned and in chart; Total Score 0     ROS:     CONSTITUTIONAL:  Negative for chills, fatigue and fever.      EYES:  Negative for blurred vision and eye drainage.      E/N/T:  Negative for ear pain, nasal congestion and sore throat.      CARDIOVASCULAR:  Negative for chest pain ( NO FURTHER CHEST PAIN, NEG CAR W/U ), palpitations and pedal edema.      RESPIRATORY:  Negative for recent cough and dyspnea.      GASTROINTESTINAL:  Negative for abdominal pain, anorexia, constipation, diarrhea, nausea and vomiting.      GENITOURINARY:  Negative for dysuria and hematuria.      MUSCULOSKELETAL:  Positive for arthralgias, (RIGHT SHOULDER, NSAIDS HELP) back pain ( chronic; NOW FOLLOWED BY PAIN MGMT ) and \"KNOTS\" IN LEFT WRIST.      NEUROLOGICAL:  Negative for headaches and weakness.          Past Medical History / Family History / Social History:         Last Reviewed on 2/22/2021 05:45 PM by Natanael Cunha    Past Medical History:             PAST MEDICAL HISTORY             CURRENT MEDICAL PROVIDERS:    Orthopedist Pain management Vascular surgeon             ADVANCED DIRECTIVES: None         PREVENTIVE HEALTH MAINTENANCE             EVALUATION FOR AORTIC ANEURYSM: was last done 2017 with the following abnormalaties noted-- AAA, FOLLOWED BY VASCULAR     COLORECTAL CANCER SCREENING: Up to date (colonoscopy q10y; sigmoidoscopy q5y; Cologuard q3y) was last done 2014; colonoscopy with normal results; RESULTS NOT IN CHART     DENTAL CLEANING: RECOMMENDED 10/28/19     EYE EXAM: was last done 2020     " PSA: was last done AUGUST-2020 with normal results         Surgical History:         Positive for    Joint Replacement:    Shoulder Replacement: LEFT;;; ; and    Vasectomy;         Family History:         Positive for Breast Cancer and Lung Cancer.      Sister(s): Breast Cancer         Social History:     Occupation: CONSTRUCTION WORK;;;     Marital Status:          Tobacco/Alcohol/Supplements:     Last Reviewed on 2/22/2021 05:45 PM by Natanael Cunha    Tobacco: He has a past history of cigarette smoking; quit date:  1996.          Alcohol: Frequency:    RARE;     Caffeine:  He admits to consuming caffeine via coffee ( 3 servings per day ).          Substance Abuse History:     Last Reviewed on 2/22/2021 05:45 PM by Natanael Cunha    NEGATIVE         Mental Health History:     Last Reviewed on 12/04/2020 03:34 PM by Alek Genao        Communicable Diseases (eg STDs):     Last Reviewed on 12/04/2020 03:34 PM by Alek Genao        Current Problems:     Last Reviewed on 2/22/2021 05:45 PM by Natanael Cunha    Essential (primary) hypertension    Chronic low back pain    Hearing difficulties    Pure hyperglyceridemia-mild    Cervicalgia-chronic    Abdominal aortic aneurysm, without rupture    Dysthymic disorder    Encounter for general adult medical examination without abnormal findings    Encounter for screening for depression    Encounter for immunization        Immunizations:     zzPrevnar-13 5/26/2016    Fluzone High-Dose pf (>=65 yr) 11/29/2017    Fluzone High-Dose pf (>=65 yr) 9/26/2018    Fluzone High-Dose pf (>=65 yr) 10/28/2019    PNEUMOVAX 23 (Pneumococcal PPV23) 6/12/2017        Allergies:     Last Reviewed on 2/22/2021 05:45 PM by Natanael Cunha    Cats:      dogs:          Current Medications:     Last Reviewed on 2/22/2021 05:45 PM by Natanael uCnha    gabapentin 600 mg oral tablet [take 1 tablet (600 mg) by oral route 3 times per day]    meloxicam 15  mg oral tablet [take 1 tablet (15 mg) by oral route once daily]    Lisinopril 20 mg oral tablet [1 tab daily]    HYDROcodone-acetaminophen  mg oral tablet [1 tab every 8 hours prn]    Zoloft 50 mg oral tablet [Take 1 tablet by mouth once daily]    cyclobenzaprine 10 mg oral tablet [ 1 tablet AT HS PRN ]    vitamin d     Endur-C with dheeraj hips     vitamin b  complex daily     vitamin e daily     potassium         Objective:        Vitals:         Current: 2/22/2021 2:12:16 PM    Ht:  6 ft, 1.5 in;  Wt: 250.2 lbs;  BMI: 32.6T: 95.6 F (temporal);  BP: 123/74 mm Hg (right arm, sitting);  P: 75 bpm (left arm (BP Cuff), sitting);  sCr: 1.06 mg/dL;  GFR: 75.22        Exams:     PHYSICAL EXAM:     GENERAL: Vitals recorded well developed, well nourished;  well groomed;  no apparent distress;     EYES: conjunctiva and cornea are normal;     E/N/T:  normal EACs, TMs, nasal/oral mucosa, teeth, gingiva, and oropharynx;     NECK: range of motion is decreased;  trachea is midline; thyroid is non-palpable; carotid exam reveals no bruits;     RESPIRATORY: normal respiratory rate and pattern with no distress; normal breath sounds with no rales, rhonchi, wheezes or rubs;     CARDIOVASCULAR: normal rate; rhythm is regular;  no systolic murmur; no edema;     GASTROINTESTINAL: nontender, nondistended; no hepatosplenomegaly or masses; no bruits;     LYMPHATIC: no enlargement of cervical or facial nodes; no supraclavicular nodes;     MUSCULOSKELETAL: normal gait; normal overall tone TWO SMALL GANGLIONS IN LEFT WRIST (5 X 5 MM);     NEUROLOGIC: GROSSLY INTACT     PSYCHIATRIC:  appropriate affect and demeanor; normal speech pattern; grossly normal memory;         Procedures:     Encounter for immunization    1. Patient experienced no reaction.              Assessment:         Z00.00   Encounter for general adult medical examination without abnormal findings       M67.432   Ganglion, left wrist       Z23   Encounter for immunization        Z13.31   Encounter for screening for depression           ORDERS:         Meds Prescribed:       [Refilled] Lisinopril 20 mg oral tablet [1 tab daily], #90 (ninety) tablets, Refills: 1 (one)         Procedures Ordered:       41199  Immunization administration; one vaccine  (In-House)            55957  Fluzone High Dose  (In-House)              Annual wellness visit, includes a PPPS, subsequent visit  (In-House)            REFER  Referral to Specialist or Other Facility  (Send-Out)              Other Orders:         Depression screen negative  (In-House)            1101F  Pt screen for fall risk; document no falls in past year or only 1 fall w/o injury in past year (GUILLAUME)  (In-House)                      Plan:         Encounter for general adult medical examination without abnormal findings        COUNSELING was provided today regarding the following topics: healthy eating habits, regular exercise, use of seat belts, fall prevention, Medicare Preventive Service Guide given to patient., Advanced Directives information given, ADVISED TO SEE AN EYE DOCTOR AND A DENTIST REGULARLY, and Given Home Safety Handout.      FOLLOW-UP: Schedule a follow-up visit in 6 months.  MIPS Negative Depression Screen           Orders:         Depression screen negative  (In-House)            1101F  Pt screen for fall risk; document no falls in past year or only 1 fall w/o injury in past year (GUILLAUME)  (In-House)              Annual wellness visit, includes a PPPS, subsequent visit  (In-House)              Ganglion, left wrist        REFERRALS:  Referral initiated to an orthopedist ( Dr. Victorino Gar ).            Orders:       REFER  Referral to Specialist or Other Facility  (Send-Out)              Encounter for immunization          Immunizations:       60366  Immunization administration; one vaccine  (In-House)            09122  Fluzone High Dose  (In-House)                Dose (ml): 0.7  Site: left deltoid  Route:  intramuscular  Administered by: Linda Sigala          : [employer]  Lot #: [id number]  Exp: 06/30/2021          NDC: [address]-51            Other Prescriptions:       [Refilled] Lisinopril 20 mg oral tablet [1 tab daily], #90 (ninety) tablets, Refills: 1 (one)         Patient Recommendations:        For  Encounter for general adult medical examination without abnormal findings:    Limit dietary intake of fat (especially saturated fat) and cholesterol.  Eat a variety of foods, including plenty of fruits, vegetables, and grain containg fiber, limit fat intake to 30% of total calories. Balance caloric intake with energy expended. Maintaining regular physical activity is advised to help prevent heart disease, hypertension, diabetes, and obesity. Always use shoulder/lap restraints when driving or riding in a vehicle, even those equipped with air bags. Regularly exercise within recommended guidelines, especially to maintain balance. Remove obstacles in walkways at home.  Use non-skid material for bathtub safety.  Schedule a follow-up visit in 6 months.              Charge Capture:         Primary Diagnosis:     Z00.00  Encounter for general adult medical examination without abnormal findings           Orders:      98201  Preventive medicine, established patient, age 65+ years  (In-House)              Depression screen negative  (In-House)            1101F  Pt screen for fall risk; document no falls in past year or only 1 fall w/o injury in past year (GUILLAUME)  (In-House)              Annual wellness visit, includes a PPPS, subsequent visit  (In-House)              M67.432  Ganglion, left wrist     Z23  Encounter for immunization           Orders:      17311  Immunization administration; one vaccine  (In-House)            78052  Fluzone High Dose  (In-House)              Z13.31  Encounter for screening for depression

## 2021-05-18 NOTE — PROGRESS NOTES
Ramirez Baltazar 1950     Office/Outpatient Visit    Visit Date: Fri, Jun 1, 2018 11:40 am    Provider: Paris Davis N.P. (Assistant: Mónica Hook MA)    Location: [facility]        Electronically signed by Paris Davis N.P. on  06/02/2018 10:29:50 PM                             SUBJECTIVE:        CC:     Mr. Baltazar is a 67 year old White male.  presents today due to sinuses, PT STATES HE HAS A SINUS INFECTION The patient is accompanied into the exam room by his spouse.          HPI:         Patient complains of uRI.  These have been present for the past one week.  The symptoms include cough, nasal congestion and yellow nasal discharge.  He denies wheezing.  He reports recent exposure to illness from family members.  He has already tried to relieve the symptoms with antihistamines.      ROS:     CONSTITUTIONAL:  Positive for fatigue.   Negative for fever.      E/N/T:  Positive for nasal congestion and frequent rhinorrhea.   Negative for sore throat.      CARDIOVASCULAR:  Negative for chest pain, palpitations, tachycardia, orthopnea, and edema.      RESPIRATORY:  Positive for recent cough.      GASTROINTESTINAL:  Negative for abdominal pain, heartburn, constipation, diarrhea, and stool changes.      MUSCULOSKELETAL:  Negative for arthralgias, back pain, and myalgias.      NEUROLOGICAL:  Negative for dizziness, headaches, paresthesias, and weakness.      ENDOCRINE:  Negative for hair loss, heat/cold intolerance, polydipsia, and polyphagia.      PSYCHIATRIC:  Negative for anxiety, depression, and sleep disturbances.          PMH/FMH/SH:     Last Reviewed on 4/09/2018 08:58 AM by Natanael Cunha    Past Medical History:             PAST MEDICAL HISTORY             CURRENT MEDICAL PROVIDERS:    Orthopedist Pain management Vascular surgeon             ADVANCED DIRECTIVES: None         Surgical History:         Positive for    Vasectomy;     Positive for    Colonoscopy ( 2014-NEG;; );          Family History:         Positive for Breast Cancer and Lung Cancer.      Sister(s): Breast Cancer         Social History:     Occupation: CONSTRUCTION WORK;;;     Marital Status:          Tobacco/Alcohol/Supplements:     Last Reviewed on 4/09/2018 08:57 AM by Natanael Cunha    Tobacco: He has a past history of cigarette smoking; quit date:  1996.          Alcohol: Frequency:    RARE;     Caffeine:  He admits to consuming caffeine via coffee ( 3 servings per day ).          Substance Abuse History:     Last Reviewed on 4/09/2018 08:56 AM by Natanael Cunha    NEGATIVE             Current Problems:     Last Reviewed on 7/21/2017 02:19 PM by Natanael Cunha    AAA (Vascular)     Chronic neck pain     Elevated cholesterol     Hearing difficulties     Chronic low back pain     HTN     Depression responsive to treatment     Postoperative urethral stricture     Screening for prostate cancer         Immunizations:     zzPrevnar-13 5/26/2016     Fluzone High-Dose pf (>=65 yr) 11/29/2017     PNEUMOVAX 23 (Pneumococcal PPV23) 6/12/2017         Allergies:     Last Reviewed on 4/09/2018 08:58 AM by Natanael Cunha    Cats:    dogs:        Current Medications:     Last Reviewed on 4/09/2018 09:00 AM by Natanael Cunha    Diclofenac Sodium 75mg Tablets, Enteric Coated 1 tab bid with food     Fluocinonide 0.05% Cream Apply thin film to affected area bid prn     Lisinopril 20mg Tablet 1 tab daily     Zoloft 50mg Tablet 1 a day     Cyclobenzaprine HCl 10mg Tablet 1 tablet AT HS PRN     Gabapentin 300mg Capsules One capsulePO Q 6 hours     Hydrocodone/Acetaminophen 10mg/325mg Tablet 1 tab every 8 hours prn     potassium     ginko daily     vitamin e daily     vitamin b  complex daily     Vitamin C     vitamin d         OBJECTIVE:        Vitals:         Historical:     04/09/2018  BP:   117/73 mm Hg ( (left arm, , sitting, );)     04/09/2018  Wt:   242.3lbs        Current:  6/1/2018 11:42:28 AM    Ht:  6 ft, 1.5 in;  Wt: 239.6 lbs;  BMI: 31.2    T: 97.6 F (oral);  BP: 111/67 mm Hg (left arm, sitting);  P: 67 bpm (left arm (BP Cuff), sitting);  sCr: 0.9 mg/dL;  GFR: 90.58    O2 Sat: 98 % (room air)        Exams:     PHYSICAL EXAM:     GENERAL: no apparent distress;     E/N/T: EARS: both TMs are have fluid behind them;  NOSE: nasal mucosa is erythematous;  bilateral maxillary sinus tenderness present;     RESPIRATORY: normal respiratory rate and pattern with no distress; normal breath sounds with no rales, rhonchi, wheezes or rubs;     CARDIOVASCULAR: normal rate; rhythm is regular;     LYMPHATIC: no enlargement of cervical or facial nodes;     MUSCULOSKELETAL:  Normal range of motion, strength and tone;     NEUROLOGIC: mental status: alert and oriented x 3; GROSSLY INTACT     PSYCHIATRIC:  appropriate affect and demeanor; normal speech pattern; grossly normal memory;         ASSESSMENT           461.0   J01.00  Acute maxillary sinusitis              DDx:         ORDERS:         Meds Prescribed:       Amoxicillin 875mg Tablet 1 BID  #10 (Ten) tablet(s) Refills: 0                 PLAN:          Acute maxillary sinusitis         RECOMMENDATIONS given include: rest, increase oral fluid intake, and follow up if not improving.  flonase nasal spray..            Prescriptions:       Amoxicillin 875mg Tablet 1 BID  #10 (Ten) tablet(s) Refills: 0             Patient Recommendations:        For  Acute maxillary sinusitis:     Get plenty of rest. Increase oral fluid intake.              CHARGE CAPTURE           **Please note: ICD descriptions below are intended for billing purposes only and may not represent clinical diagnoses**        Primary Diagnosis:         461.0 Acute maxillary sinusitis            J01.00    Acute maxillary sinusitis, unspecified              Orders:          04429   Office/outpatient visit; established patient, level 3  (In-House)

## 2021-05-18 NOTE — PROGRESS NOTES
"Ramirez Baltazar 1950     Office/Outpatient Visit    Visit Date: Wed, Feb 20, 2019 10:27 am    Provider: Natanael Cunha MD (Assistant: Usha Morrow MA)    Location: [facility]        Electronically signed by Natanael Cunha MD on  02/20/2019 01:24:12 PM                             SUBJECTIVE:        CC:     Mr. Baltazar is a 68 year old White male.  PT STATES HE HAD EYE EXAM LAST WEEK AND WAS TOLD HE HAS BLOOD IN HIS LEFT EYE         HPI:         PHQ-9 Depression Screening: Completed form scanned and in chart; Total Score 3 Alcohol Consumption Screening: Completed form scanned and in chart; Total Score 1         With regard to the hTN, his current cardiac medication regimen includes an ACE inhibitor ( lisinopril ).  He did not bring his blood pressure diary, but says that pressures have been well controlled.  He is tolerating the medication well without side effects.  Compliance with treatment has been good; he takes his medication as directed.          Elevated cholesterol details; current treatment includes a low cholesterol/low fat diet.  Compliance with treatment has been fair.  Most recent lab tests include Total Cholesterol:  172 (mg/dL) (07/20/2018), HDL:  26 (mg/dL) (07/20/2018), Triglycerides:  369 (mg/dL) (07/20/2018), LDL:  72 (mg/dL) (07/20/2018).      ROS:     CONSTITUTIONAL:  Negative for chills and fever.      EYES:  Positive for blurred vision ( right eye ) and HE WAS TOLD CATARACT IN THE RIGHT AND \"BLEEDING SPOT\" IN THE LEFT.   Negative for eye pain.      E/N/T:  Negative for ear pain, nasal congestion and sore throat.      CARDIOVASCULAR:  Negative for chest pain, palpitations and pedal edema.      RESPIRATORY:  Negative for recent cough and dyspnea.      GASTROINTESTINAL:  Negative for abdominal pain, anorexia, constipation, diarrhea, nausea and vomiting.      MUSCULOSKELETAL:  Positive for arthralgias, (RIGHT SHOULDER, NSAIDS HELP) back pain ( chronic ) and ALSO CHRONIC " NECK PAIN.      NEUROLOGICAL:  Negative for headaches.          PMH/FMH/SH:     Last Reviewed on 2/20/2019 10:37 AM by Natanael Cunha    Past Medical History:             PAST MEDICAL HISTORY             CURRENT MEDICAL PROVIDERS:    Orthopedist Pain management Vascular surgeon             ADVANCED DIRECTIVES: None         Surgical History:         Positive for    Vasectomy;     Positive for    Colonoscopy ( 2014-NEG;; );         Family History:         Positive for Breast Cancer and Lung Cancer.      Sister(s): Breast Cancer         Social History:     Occupation: CONSTRUCTION WORK;;;     Marital Status:          Tobacco/Alcohol/Supplements:     Last Reviewed on 2/20/2019 10:36 AM by Natanael Cunha    Tobacco: He has a past history of cigarette smoking; quit date:  1996.          Alcohol: Frequency:    RARE;     Caffeine:  He admits to consuming caffeine via coffee ( 3 servings per day ).          Substance Abuse History:     Last Reviewed on 2/20/2019 10:36 AM by Natanael Cunha    NEGATIVE             Current Problems:     Last Reviewed on 2/20/2019 10:40 AM by Natanael Cunha    AAA (Vascular)     Chronic neck pain     Elevated cholesterol     Hearing difficulties     Chronic low back pain     HTN     Depression responsive to treatment         Immunizations:     zzPrevnar-13 5/26/2016     Fluzone High-Dose pf (>=65 yr) 11/29/2017     Fluzone High-Dose pf (>=65 yr) 9/26/2018     PNEUMOVAX 23 (Pneumococcal PPV23) 6/12/2017         Allergies:     Last Reviewed on 2/20/2019 10:36 AM by Natanael Cunha    Cats:    dogs:        Current Medications:     Last Reviewed on 2/20/2019 10:40 AM by Natanael Cunha    Diclofenac Sodium 75mg Tablets, Enteric Coated 1 tab bid with food     Lisinopril 20mg Tablet 1 tab daily     Zoloft 50mg Tablet 1 a day     Fluocinonide 0.05% Cream Apply thin film to affected area bid prn     Cyclobenzaprine HCl 10mg Tablet 1 tablet AT HS  PRN     Gabapentin 300mg Capsules One capsulePO Q 6 hours     Hydrocodone/Acetaminophen 10mg/325mg Tablet 1 tab every 8 hours prn     potassium     ginko daily     vitamin e daily     vitamin b  complex daily     Vitamin C     vitamin d         OBJECTIVE:        Vitals:         Current: 2/20/2019 10:35:14 AM    Ht:  6 ft, 1.5 in;  Wt: 247 lbs;  BMI: 32.1    T: 98.3 F (oral);  BP: 129/83 mm Hg (left arm, sitting);  P: 84 bpm (left arm (BP Cuff), sitting);  sCr: 0.93 mg/dL;  GFR: 87.63    VA: 20/200 OD, 20/25 OS (without correction)        Exams:     PHYSICAL EXAM:     GENERAL:  well developed and nourished; appropriately groomed; in no apparent distress;     NECK: trachea is midline; thyroid is non-palpable;     RESPIRATORY: normal respiratory rate and pattern with no distress; normal breath sounds with no rales, rhonchi, wheezes or rubs;     CARDIOVASCULAR: normal rate; rhythm is regular;  no edema;     LYMPHATIC: no enlargement of cervical or facial nodes;     MUSCULOSKELETAL:  Normal range of motion, strength and tone;     NEUROLOGIC: mental status: alert and oriented x 3; GROSSLY INTACT     PSYCHIATRIC:  appropriate affect and demeanor; normal speech pattern; grossly normal memory;         ASSESSMENT           V79.0   Z13.89  Screening for depression              DDx:     401.1   I10  HTN              DDx:     272.0   E78.1  Elevated cholesterol              DDx:         ORDERS:         Lab Orders:       [id number]  [facility] CBC with 3 part diff  (Send-Out)         18066  COMP [facility] Comp. Metabolic Panel  (Send-Out)         15903  TSH - [facility] TSH  (Send-Out)         APPTO  Appointment need  (In-House)         01317  [facility] Lipid Panel  (Send-Out)                   PLAN:          HTN     LABORATORY:  Labs ordered to be performed today include CBC, Comprehensive metabolic panel, and TSH.      FOLLOW-UP: Schedule a follow-up visit in 6 months..   for Medicare Wellness Visit           Orders:       [id number]  [facility]  CBC with 3 part diff  (Send-Out)         51225  COMP - [facility] Comp. Metabolic Panel  (Send-Out)         54101  TSH - [facility] TSH  (Send-Out)         APPTO  Appointment need  (In-House)            Elevated cholesterol     LABORATORY:  Labs ordered to be performed today include lipid panel.            Orders:       01128  [facility] - [facility] Lipid Panel  (Send-Out)               Patient Recommendations:        For  HTN:     Schedule a follow-up visit in 6 months.                APPOINTMENT INFORMATION:        Monday Tuesday Wednesday Thursday Friday Saturday Sunday            Time:___________________AM  PM   Date:_____________________             CHARGE CAPTURE           **Please note: ICD descriptions below are intended for billing purposes only and may not represent clinical diagnoses**        Primary Diagnosis:         V79.0 Screening for depression            Z13.89    Encounter for screening for other disorder    401.1 HTN            I10    Essential (primary) hypertension              Orders:          61104   Office/outpatient visit; established patient, level 4  (In-House)             APPTO   Appointment need  (In-House)           272.0 Elevated cholesterol            E78.1    Pure hyperglyceridemia

## 2021-05-18 NOTE — PROGRESS NOTES
Ramirez Baltazar 1950     Office/Outpatient Visit    Visit Date: Mon, Oct 28, 2019 10:58 am    Provider: Natanael Cunha MD (Assistant: Diamond Moreno)    Location: [facility]        Electronically signed by Natanael Cunha MD on  10/28/2019 12:33:52 PM                             SUBJECTIVE:        HPI:         Mr. Baltazar is here for a Medicare wellness visit.  The required HRA questions are integrated within this visit note. Family medical history and individual medical/surgical history were reviewed and updated.  A current height, weight, BMI, blood pressure, and pulse were recorded in the vitals section of the note and have been reviewed. Patient's medications, including supplements, were recorded in the chart and reviewed.  Current providers and suppliers were reviewed and updated.          Self-Assessment of Health: He rates his health as very good. He rates his confidence of being able to control/manage most of his health problems as very confident. His physical/emotional health has limited his social activites not at all.  A review of cognitive impairment was performed, including ability to drive a car, manage finances, and any memory changes, and was found to be negative.  A review of functional ability, including bathing, dressing, walking, and urine/bowel continence as well as level of safety was performed and was found to be negative.  Falls Risk: Has fallen 2 or more times or had one fall with injury in the past year.  In regard to hearing, he reports having trouble hearing the TV/radio when others do not and having to strain to hear or understand conversations, but not wearing hearing aid(s).  Concerning home safety, he reports that at home he DOES have adequate lighting, handrails on stairs, functioning smoke alarms and absence of throw rugs, but not a skid resistant shower/tub or grab bars in the bath.          Immunization Status: ** >10 years since last Td booster; ** Has not  received influenza vaccine for this season; Physical Activity: He exercises for at least 20 minutes 3 or more days/week.; Type of diet patient normally eats is described as well-balanced with fruits and vegetables Tobacco: He has a past history of cigarette smoking; quit date:  1996.  Preventative Health updated today.          PHQ-9 Depression Screening: Completed form scanned and in chart; Total Score 0     ROS:     CONSTITUTIONAL:  Negative for chills, fatigue and fever.      EYES:  Negative for blurred vision and eye drainage.      E/N/T:  Negative for ear pain, nasal congestion and sore throat.      CARDIOVASCULAR:  Negative for chest pain ( NO FURTHER CHEST PAIN, NEG CAR W/U ), palpitations and pedal edema.      RESPIRATORY:  Negative for recent cough and dyspnea.      GASTROINTESTINAL:  Negative for abdominal pain, anorexia, constipation, diarrhea, nausea and vomiting.      GENITOURINARY:  Negative for dysuria and hematuria.      MUSCULOSKELETAL:  Positive for arthralgias and (RIGHT SHOULDER, NSAIDS HELP) back pain ( chronic; NOW FOLLOWED BY PAIN MGMT ).      NEUROLOGICAL:  Negative for headaches and weakness.          PMH/FMH/SH:     Last Reviewed on 10/28/2019 08:45 AM by Natanael Cunha    Past Medical History:             PAST MEDICAL HISTORY             CURRENT MEDICAL PROVIDERS:    Orthopedist Pain management Vascular surgeon             ADVANCED DIRECTIVES: None         PREVENTIVE HEALTH MAINTENANCE             EVALUATION FOR AORTIC ANEURYSM: was last done 2017 with the following abnormalaties noted-- AAA, FOLLOWED BY VASCULAR     COLORECTAL CANCER SCREENING: Up to date (colonoscopy q10y; sigmoidoscopy q5y; Cologuard q3y) was last done 2014; colonoscopy with normal results; RESULTS NOT IN CHART     PSA: was last done 4/9/18 with normal results         Surgical History:         Positive for    Joint Replacement:    Shoulder Replacement: LEFT;;; ; and    Vasectomy;         Family History:          Positive for Breast Cancer and Lung Cancer.      Sister(s): Breast Cancer         Social History:     Occupation: CONSTRUCTION WORK;;;     Marital Status:          Tobacco/Alcohol/Supplements:     Last Reviewed on 10/28/2019 10:58 AM by Diamond Moreno    Tobacco: He has a past history of cigarette smoking; quit date:  1996.          Alcohol: Frequency:    RARE;     Caffeine:  He admits to consuming caffeine via coffee ( 3 servings per day ).          Substance Abuse History:     Last Reviewed on 10/28/2019 08:37 AM by Natanael Cunha    NEGATIVE             Current Problems:     Last Reviewed on 10/28/2019 08:46 AM by Natanael Cunha    AAA (Vascular)     Chronic neck pain     Elevated cholesterol     Hearing difficulties     Chronic low back pain     HTN     Depression responsive to treatment         Immunizations:     zzPrevnar-13 5/26/2016     Fluzone High-Dose pf (>=65 yr) 11/29/2017     Fluzone High-Dose pf (>=65 yr) 9/26/2018     PNEUMOVAX 23 (Pneumococcal PPV23) 6/12/2017         Allergies:     Last Reviewed on 10/28/2019 08:38 AM by Natanael Cunha    Cats:    dogs:        Current Medications:     Last Reviewed on 10/28/2019 08:46 AM by Natanael Cunha    Zoloft 50mg Tablet 1 a day     Lisinopril 20mg Tablet 1 tab daily     Diclofenac Sodium 75mg Tablets, Enteric Coated 1 tab bid with food     Cyclobenzaprine HCl 10mg Tablet 1 tablet AT HS PRN     Gabapentin 300mg Capsules One capsulePO Q 6 hours     Hydrocodone/Acetaminophen 10mg/325mg Tablet 1 tab every 8 hours prn     potassium     vitamin e daily     vitamin b  complex daily     Vitamin C     vitamin d         OBJECTIVE:        Vitals:         Current: 10/28/2019 11:16:25 AM    Ht:  6 ft, 1.5 in;  Wt: 247 lbs;  BMI: 32.1    T: 97.9 F (oral);  BP: 155/79 mm Hg (left arm, sitting);  P: 69 bpm (left arm (BP Cuff), sitting);  sCr: 0.92 mg/dL;  GFR: 88.58    VA: 20/40 OD, 20/40 OS (near, without correction)        Repeat:      11:19:03 AM     VA:    (20/40 OD,  (near, without correction, , 20/40 OS, , OU 20/40))         Exams:     PHYSICAL EXAM:     GENERAL: Vitals recorded well developed, well nourished;  well groomed;  no apparent distress;     EYES: conjunctiva and cornea are normal;     E/N/T:  normal EACs, TMs, nasal/oral mucosa, teeth, gingiva, and oropharynx;     NECK: range of motion is decreased;  trachea is midline; thyroid is non-palpable; carotid exam reveals no bruits;     RESPIRATORY: normal respiratory rate and pattern with no distress; normal breath sounds with no rales, rhonchi, wheezes or rubs;     CARDIOVASCULAR: normal rate; rhythm is regular;  no systolic murmur; no edema;     GASTROINTESTINAL: nontender, nondistended; no hepatosplenomegaly or masses; no bruits;     LYMPHATIC: no enlargement of cervical or facial nodes; no supraclavicular nodes;     MUSCULOSKELETAL: normal gait; normal overall tone     NEUROLOGIC: GROSSLY INTACT     PSYCHIATRIC:  appropriate affect and demeanor; normal speech pattern; grossly normal memory;         Procedures:     Vaccination against other viral diseases, Influenza     1. Influenza high dose 0.5 ml unit dose, atc, ABN signed given IM in the right upper arm; administered by atc;  lot number [id number]; expires 5/26/20             ASSESSMENT           V70.0   Z00.00  Health checkup              DDx:     V04.81   Z23  Vaccination against other viral diseases, Influenza              DDx:     V79.0   Z13.89  Screening for depression              DDx:         ORDERS:         Meds Prescribed:       Refill of: Zoloft (Sertraline HCl) 50mg Tablet 1 a day  #90 (Ninety) tablet(s) Refills: 1         Procedures Ordered:       [id number]  Fluzone High Dose  (In-House)           Annual wellness visit, includes a PPPS, subsequent visit  (In-House)           Other Orders:         Depression screen negative  (In-House)         1100F  Pt screen for fall risk; document 2+ falls in the past yr or  any fall w/injury in past year (GUILLAUME)  (In-House)           Administration of influenza virus vaccine (x1)                 PLAN:          Health checkup         COUNSELING was provided today regarding the following topics: healthy eating habits, regular exercise, use of seat belts, fall prevention, Medicare Preventive Service Guide given to patient., Advanced Directives information given, ADVISED TO SEE AN EYE DOCTOR AND A DENTIST REGULARLY, and Given Home Safety Handout.      FOLLOW-UP: Schedule a follow-up visit in 6 months. LABS AND PROSTATE EXAM     ORDER GIVEN FOR A SHINGRX MIPS Negative Depression Screen           Orders:         Depression screen negative  (In-House)         1100F  Pt screen for fall risk; document 2+ falls in the past yr or any fall w/injury in past year (GUILLAUME)  (In-House)           Annual wellness visit, includes a PPPS, subsequent visit  (In-House)            Vaccination against other viral diseases, Influenza         IMMUNIZATIONS given today: Influenza HIGH Dose.            Orders:       76828  Fluzone High Dose  (In-House)                     Administration of influenza virus vaccine (x1)             Other Prescriptions:       Refill of: Zoloft (Sertraline HCl) 50mg Tablet 1 a day  #90 (Ninety) tablet(s) Refills: 1         Patient Recommendations:        For  Health checkup:     Limit dietary intake of fat (especially saturated fat) and cholesterol.  Eat a variety of foods, including plenty of fruits, vegetables, and grain containg fiber, limit fat intake to 30% of total calories. Balance caloric intake with energy expended. Maintaining regular physical activity is advised to help prevent heart disease, hypertension, diabetes, and obesity. Always use shoulder/lap restraints when driving or riding in a vehicle, even those equipped with air bags. Regularly exercise within recommended guidelines, especially to maintain balance. Remove obstacles in walkways at home.  Use  non-skid material for bathtub safety.  Schedule a follow-up visit in 6 months.              CHARGE CAPTURE           **Please note: ICD descriptions below are intended for billing purposes only and may not represent clinical diagnoses**        Primary Diagnosis:         V70.0 Health checkup            Z00.00    Encounter for general adult medical examination without abnormal findings              Orders:             Depression screen negative  (In-House)             1100F   Pt screen for fall risk; document 2+ falls in the past yr or any fall w/injury in past year (GUILLAUME)  (In-House)                Annual wellness visit, includes a PPPS, subsequent visit  (In-House)           V04.81 Vaccination against other viral diseases, Influenza            Z23    Encounter for immunization              Orders:          [id number]   Fluzone High Dose  (In-House)                                           Administration of influenza virus vaccine (x1)         V79.0 Screening for depression            Z13.89    Encounter for screening for other disorder

## 2021-05-18 NOTE — PROGRESS NOTES
Ramirez Baltazar  1950     Office/Outpatient Visit    Visit Date: Tue, Dec 10, 2019 02:49 pm    Provider: Natanael Cunha MD (Assistant: Orly Pulliam MA)    Location: [facility]        Electronically signed by Natanael Cunha MD on  12/10/2019 06:15:17 PM                             Subjective:        CC: Mr. Baltazar is a 69 year old White male.  migraines PT STATES HE ISNT TAKING LISINOPRIL         HPI:           Patient to be evaluated for essential (primary) hypertension.  This was first diagnosed several years ago.  He is not currently taking an antihypertensive.  Compliance with treatment has been good; he takes his medication as directed.  He has not kept a blood pressure diary, but states that pressures have been well controlled.  ACEI IS ON HOLD DUE TO LOW BP.  STATES BP IS OK AT HOME.            Dx with cervicalgia; the location of discomfort is posterior and superior.  It radiates to the scalp.  The pain is characterized as moderate in intensity, intermittent, and dull.  Initial onset was several weeks ago.  Medical history is pertinent for ON CHRONIC MEDS FROM PAIN MGMT.  CHRONIC BUT WORSE SINCE A FALL LAST FUMMER.      ROS:     CONSTITUTIONAL:  Negative for chills, fatigue and fever.      EYES:  Negative for blurred vision and photophobia.      E/N/T:  Negative for ear pain, nasal congestion and sore throat.      GASTROINTESTINAL:  Negative for abdominal pain, diarrhea, nausea and vomiting.      MUSCULOSKELETAL:  Positive for arthralgias and (RIGHT SHOULDER, NSAIDS HELP) back pain ( chronic; NOW FOLLOWED BY PAIN MGMT ).      NEUROLOGICAL:  Negative for dizziness, fainting, paresthesias and weakness.          Past Medical History / Family History / Social History:         Last Reviewed on 12/10/2019 03:13 PM by Natanael Cunha    Past Medical History:             PAST MEDICAL HISTORY             CURRENT MEDICAL PROVIDERS:    Orthopedist Pain management Vascular  surgeon             ADVANCED DIRECTIVES: None         PREVENTIVE HEALTH MAINTENANCE             EVALUATION FOR AORTIC ANEURYSM: was last done 2017 with the following abnormalaties noted-- AAA, FOLLOWED BY VASCULAR     COLORECTAL CANCER SCREENING: Up to date (colonoscopy q10y; sigmoidoscopy q5y; Cologuard q3y) was last done 2014; colonoscopy with normal results; RESULTS NOT IN CHART     DENTAL CLEANING: RECOMMENDED 10/28/19     EYE EXAM: was last done 2019     PSA: was last done 4/9/18 with normal results         Surgical History:         Positive for    Joint Replacement:    Shoulder Replacement: LEFT;;; ; and    Vasectomy;         Family History:         Positive for Breast Cancer and Lung Cancer.      Sister(s): Breast Cancer         Social History:     Occupation: CONSTRUCTION WORK;;;     Marital Status:          Tobacco/Alcohol/Supplements:     Last Reviewed on 12/10/2019 03:13 PM by Natanale Cunha    Tobacco: He has a past history of cigarette smoking; quit date:  1996.          Alcohol: Frequency:    RARE;     Caffeine:  He admits to consuming caffeine via coffee ( 3 servings per day ).          Substance Abuse History:     Last Reviewed on 12/10/2019 03:13 PM by Natanael Cunha    NEGATIVE         Current Problems:     Last Reviewed on 12/10/2019 03:13 PM by Natanael Cunha    Other specified depressive episodes    Essential (primary) hypertension    Chronic low back pain    HTN    Depression responsive to treatment    Hearing difficulties    Pure hyperglyceridemia    Elevated cholesterol    Chronic neck pain    Cervicalgia    Abdominal aortic aneurysm, without rupture    AAA (Vascular)    Screening for depression    Encounter for screening for other disorder    Headache        Immunizations:     zzPrevnar-13 5/26/2016    Fluzone High-Dose pf (>=65 yr) 11/29/2017    Fluzone High-Dose pf (>=65 yr) 9/26/2018    Fluzone High-Dose pf (>=65 yr) 10/28/2019    PNEUMOVAX 23  (Pneumococcal PPV23) 6/12/2017        Allergies:     Last Reviewed on 12/10/2019 03:13 PM by Natanael Cunha    Cats:      dogs:          Current Medications:     Last Reviewed on 12/10/2019 03:13 PM by Natanael Cunha    Hydrocodone/Acetaminophen 10mg/325mg Tablet [1 tab every 8 hours prn]    Gabapentin 300mg Capsules [One capsulePO Q 6 hours]    Diclofenac Sodium 75mg Tablets, Enteric Coated [1 tab bid with food]    Lisinopril 20mg Tablet [1 tab daily]    Zoloft 50mg Tablet [1 a day]    Cyclobenzaprine HCl 10mg Tablet [ 1 tablet AT HS PRN ]    vitamin d    Vitamin C     vitamin b  complex daily    vitamin e daily    potassium        Objective:        Vitals: STATES BP OK AT HOME         Current: 12/10/2019 2:56:59 PM    Ht:  6 ft, 1.5 in;  Wt: 246.2 lbs;  BMI: 32.0T: 97.5 F (oral);  BP: 161/90 mm Hg (right arm, sitting);  P: 68 bpm (right arm (BP Cuff), sitting);  sCr: 0.92 mg/dL;  GFR: 87.27        Exams:     PHYSICAL EXAM:     GENERAL: Vitals recorded well developed, well nourished;  well groomed;  no apparent distress;     EYES: extraocular movements intact; conjunctiva and cornea are normal; PERRL;     NECK: range of motion is decreased;  trachea is midline; thyroid is non-palpable; TENDER AT THE BASE OF THE OCCIPUT;     RESPIRATORY: normal respiratory rate and pattern with no distress; normal breath sounds with no rales, rhonchi, wheezes or rubs;     CARDIOVASCULAR: normal rate; rhythm is regular;  no systolic murmur; no edema;     LYMPHATIC: no enlargement of cervical or facial nodes; no supraclavicular nodes;     MUSCULOSKELETAL: normal gait; normal overall tone     NEUROLOGIC: mental status: alert and oriented x 3; cranial nerves: II-XII GROSSLY INTACT;  normal coordination and cerebellar function; GROSSLY INTACT     PSYCHIATRIC:  appropriate affect and demeanor; normal speech pattern; grossly normal memory;         Assessment:         I10   Essential (primary) hypertension       M54.2    Cervicalgia       R51   Headache           ORDERS:         Radiology/Test Orders:       [id number]  Radiologic examination, spine, cervical; two or three views  (Send-Out)            [id number]  Radiologic examination, skull; complete, minimum of four views  (Send-Out)                      Plan:         Essential (primary) hypertension        RECOMMENDATIONS given include: perform routine monitoring of blood pressure with home blood pressure cuff, reduction of dietary salt intake, and BREAK ACI IN HALF.      FOLLOW-UP: Schedule a follow-up visit in 3 months.      OBSERVE OFF MEDS         Cervicalgia        TESTS/PROCEDURES ordered today include C-spine x-rays.      REFERRALS:  Referral initiated to physical therapy.            Orders:       [id number]  Radiologic examination, spine, cervical; two or three views  (Send-Out)              Headache        RADIOLOGY:  I have ordered Skull series x-ray to be done today.  Observe for now Consider further workup           Orders:       [id number]  Radiologic examination, skull; complete, minimum of four views  (Send-Out)                  Patient Recommendations:        For  Essential (primary) hypertension:    Begin monitoring your blood pressure by brief nurse visits at our office, a home blood pressure monitor, or by checking on the machines in pharmacies or stores.  Keep a log of the readings. Reduce the amount of salt in your diet.  Schedule a follow-up visit in 3 months.              Charge Capture:         Primary Diagnosis:     I10  Essential (primary) hypertension           Orders:      [id number]  Office/outpatient visit; established patient, level 4  (In-House)              M54.2  Cervicalgia     R51  Headache

## 2021-05-18 NOTE — PROGRESS NOTES
Ramirez Baltazar  1950     Office/Outpatient Visit    Visit Date: Fri, Dec 4, 2020 03:07 pm    Provider: Alek Genao MD (Assistant: Linda Mckeon MA)    Location: [facility]        Electronically signed by Alek Genao MD on  12/04/2020 03:35:11 PM                             Subjective:        CC: Mr. Baltazar is a 70 year old White male.  Patient feels as though he has a sinus infection. He has pain in his sinus area.          HPI:           Acute sinusitis, unspecified noted.  These have been present for the past one week.  The symptoms include ear congestion,  headache, nasal congestion, nasal discharge and sinus pain/pressure.  He denies body aches, chest congestion, Chills, cough or fever.  He denies exposure to ill contacts.  He has already tried to relieve the symptoms with decongestants and throat lozenges/sprays.  Pertinent medical history is unremarkable.      ROS:     CONSTITUTIONAL:  Negative for chills, fatigue and fever.      E/N/T:  Positive for ear pain ( bilateral ), nasal congestion, frequent rhinorrhea and sinus pressure.   Negative for tinnitus or sore throat.      CARDIOVASCULAR:  Negative for chest pain, dizziness, palpitations and edema.      RESPIRATORY:  Negative for dyspnea and cough.      GASTROINTESTINAL:  Negative for abdominal pain, constipation, diarrhea, heartburn, nausea and vomiting.      GENITOURINARY:  Negative for dysuria, hematuria and polyuria.      MUSCULOSKELETAL:  Negative for arthralgias and myalgias.      INTEGUMENTARY:  Negative for rash.      NEUROLOGICAL:  Positive for headaches.   Negative for paresthesias or weakness.          Past Medical History / Family History / Social History:         Last Reviewed on 12/04/2020 03:34 PM by Alek Genao    Past Medical History:             PAST MEDICAL HISTORY             CURRENT MEDICAL PROVIDERS:    Orthopedist Pain management Vascular surgeon             ADVANCED DIRECTIVES: None          PREVENTIVE HEALTH MAINTENANCE             EVALUATION FOR AORTIC ANEURYSM: was last done 2017 with the following abnormalaties noted-- AAA, FOLLOWED BY VASCULAR     COLORECTAL CANCER SCREENING: Up to date (colonoscopy q10y; sigmoidoscopy q5y; Cologuard q3y) was last done 2014; colonoscopy with normal results; RESULTS NOT IN CHART     DENTAL CLEANING: RECOMMENDED 10/28/19     EYE EXAM: was last done 2019     PSA: was last done 4/9/18 with normal results         Surgical History:         Positive for    Joint Replacement:    Shoulder Replacement: LEFT;;; ; and    Vasectomy;         Family History:         Positive for Breast Cancer and Lung Cancer.      Sister(s): Breast Cancer         Social History:     Occupation: CONSTRUCTION WORK;;;     Marital Status:          Tobacco/Alcohol/Supplements:     Last Reviewed on 12/04/2020 03:34 PM by Alek Genao    Tobacco: He has a past history of cigarette smoking; quit date:  1996.          Alcohol: Frequency:    RARE;     Caffeine:  He admits to consuming caffeine via coffee ( 3 servings per day ).          Substance Abuse History:     Last Reviewed on 12/04/2020 03:34 PM by Alek Genao    NEGATIVE         Mental Health History:     Last Reviewed on 12/04/2020 03:34 PM by Alek Genao        Communicable Diseases (eg STDs):     Last Reviewed on 12/04/2020 03:34 PM by Alek Genao        Current Problems:     Last Reviewed on 12/04/2020 03:34 PM by Alek Genao    Chronic low back pain    Hearing difficulties    Pure hyperglyceridemia-mild    Cervicalgia-chronic    Abdominal aortic aneurysm, without rupture    Dysthymic disorder    Mixed hyperlipidemia    Acute sinusitis, unspecified        Immunizations:     zzPrevnar-13 5/26/2016    Fluzone High-Dose pf (>=65 yr) 11/29/2017    Fluzone High-Dose pf (>=65 yr) 9/26/2018    Fluzone High-Dose pf (>=65 yr) 10/28/2019    PNEUMOVAX 23 (Pneumococcal PPV23) 6/12/2017        Allergies:     Last Reviewed on 12/04/2020 03:34  "PM by Alek Genao    Cats:      dogs:          Current Medications:     Last Reviewed on 12/04/2020 03:34 PM by Alke Genao    gabapentin 600 mg oral tablet [take 1 tablet (600 mg) by oral route 3 times per day]    meloxicam 15 mg oral tablet [take 1 tablet (15 mg) by oral route once daily]    Lisinopril 20 mg oral tablet [1 tab daily]    Zoloft 50 mg oral tablet [Take 1 tablet by mouth once daily]    HYDROcodone-acetaminophen  mg oral tablet [1 tab every 8 hours prn]    cyclobenzaprine 10 mg oral tablet [ 1 tablet AT HS PRN ]    vitamin d     Endur-C with dheeraj hips     vitamin b  complex daily     vitamin e daily     potassium         Objective:        Vitals:         Current: 12/4/2020 3:09:52 PM    Ht:  6 ft, 1.5 in;  Wt: 240.5 lbs;  BMI: 31.3T: 97 F (temporal);  BP: 150/67 mm Hg (left arm, sitting);  P: 63 bpm (left arm (BP Cuff), sitting);  sCr: 1.06 mg/dL;  GFR: 73.97        Exams:     PHYSICAL EXAM:     GENERAL: Vitals recorded well developed, well nourished;  no apparent distress;     EYES: conjunctiva and cornea are normal;     E/N/T: EARS: both TMs are have fluid behind them and red;  NOSE: nasal mucosa is boggy and erythematous;  bilateral maxillary sinus tenderness present;     RESPIRATORY: Clear to auscultation bilateally; no rales (\"crackles\") present; no rhonchi; no wheezes;     CARDIOVASCULAR: normal rate; rhythm is regular;  No murmurs, clicks, gallops or rubs appreciated; no edema;     LYMPHATIC: no enlargement of cervical or facial nodes; no supraclavicular nodes;     SKIN:  No significant rashes, lesions or suspicious moles within limits of examination;     NEUROLOGIC: Grossly intact; mental status: alert and oriented x 3;     PSYCHIATRIC: appropriate affect and demeanor; normal speech pattern; Normal behavior;         Assessment:         J01.90   Acute sinusitis, unspecified           ORDERS:         Meds Prescribed:       [New Rx] amoxicillin-pot clavulanate 875-125 mg oral tablet " [take 1 tablet by oral route every 12 hours], #14 (fourteen) tablets, Refills: 0 (zero)                 Plan:         Acute sinusitis, unspecified- Picture consistent with sinusitis. Will cover with Augmentin 875-125 mg BID x 7 days. OTC decongestants and tylenol as needed. RTC for persistent or worsening sx.           Prescriptions:       [New Rx] amoxicillin-pot clavulanate 875-125 mg oral tablet [take 1 tablet by oral route every 12 hours], #14 (fourteen) tablets, Refills: 0 (zero)             Charge Capture:         Primary Diagnosis:     J01.90  Acute sinusitis, unspecified           Orders:      [id number]  Office/outpatient visit; established patient, level 3  (In-House)

## 2021-05-18 NOTE — PROGRESS NOTES
Ramirez Baltazar  1950     Office/Outpatient Visit    Visit Date: Mon, Mar 29, 2021 01:59 pm    Provider: Natanael Cunha MD (Assistant: Linda Sigala,  )    Location: [facility]        Electronically signed by Natanael Cunha MD on  03/30/2021 03:20:36 PM                             Subjective:        CC: Mr. Baltazar is a 70 year old White male.  He is here today following a transition of care from an inpatient hospital: The patient was admitted on3/17/21 and discharged 3/18/21. The patient was admitted forAAA repair]. Our office called the patient within 48 hours of discharge and scheduled the follow-up appointment. During the patient's hospital stay the patient was treated by Dr Bolton.. Medications have been reviewed and reconciled with discharge summary..          HPI:           Mr. Baltazar presents in follow up from hospital admission He was diagnosed with AAA > 5 CM.  The following procedures were done: AAA REPAIR It is of severe intensity.  DOING WELL POST-OP.  WILL SEE SURGEON AGAIN SOON           Essential (primary) hypertension details; this was first diagnosed several years ago.  His current cardiac medication regimen includes an ACE inhibitor.  Compliance with treatment has been good; he takes his medication as directed.  He has not kept a blood pressure diary, but states that pressures have been well controlled.      ROS:     CONSTITUTIONAL:  Negative for chills, fatigue and fever.      E/N/T:  Negative for ear pain, nasal congestion and sore throat.      CARDIOVASCULAR:  Negative for chest pain ( NO FURTHER CHEST PAIN, NEG CAR W/U ), palpitations and pedal edema.      RESPIRATORY:  Negative for recent cough and dyspnea.      GASTROINTESTINAL:  Negative for abdominal pain, anorexia, constipation, diarrhea, nausea and vomiting.      MUSCULOSKELETAL:  Positive for back pain ( chronic; NOW FOLLOWED BY PAIN MGMT ) and HAS NOT YET SEE THE ORTHOPEDIST FOR THE GANGLION  ON HIS LEFT WRIST.      NEUROLOGICAL:  Negative for headaches and weakness.          Past Medical History / Family History / Social History:         Last Reviewed on 3/30/2021 02:56 PM by Natanael Cunha    Past Medical History:             PAST MEDICAL HISTORY             CURRENT MEDICAL PROVIDERS:    Orthopedist Pain management Vascular surgeon             ADVANCED DIRECTIVES: None         PREVENTIVE HEALTH MAINTENANCE             EVALUATION FOR AORTIC ANEURYSM: was last done 2017 with the following abnormalaties noted-- AAA, S/P REPAIR BY VASCULAR     COLORECTAL CANCER SCREENING: Up to date (colonoscopy q10y; sigmoidoscopy q5y; Cologuard q3y) was last done 2014; colonoscopy with normal results; RESULTS NOT IN CHART     DENTAL CLEANING: RECOMMENDED 10/28/19     EYE EXAM: was last done 2020     PSA: was last done AUGUST-2020 with normal results         Surgical History:         Positive for    Joint Replacement:    Shoulder Replacement: LEFT;;; ; and    Vasectomy;     Positive for    AAA REPAIR;;;;         Family History:         Positive for Breast Cancer and Lung Cancer.      Sister(s): Breast Cancer         Social History:     Occupation: CONSTRUCTION WORK;;;     Marital Status:          Tobacco/Alcohol/Supplements:     Last Reviewed on 3/30/2021 02:56 PM by Natanael Cunha    Tobacco: He has a past history of cigarette smoking; quit date:  1996.          Alcohol: Frequency:    RARE;     Caffeine:  He admits to consuming caffeine via coffee ( 3 servings per day ).          Substance Abuse History:     Last Reviewed on 3/30/2021 02:56 PM by Natanael Cunha    NEGATIVE         Mental Health History:     Last Reviewed on 12/04/2020 03:34 PM by Alek Genao        Communicable Diseases (eg STDs):     Last Reviewed on 12/04/2020 03:34 PM by Alek Genao        Current Problems:     Last Reviewed on 3/30/2021 02:56 PM by Natanael Cunha    Essential (primary) hypertension     Chronic low back pain    Hearing difficulties    Pure hyperglyceridemia-mild    Cervicalgia-chronic    Abdominal aortic aneurysm (s/p repair)    Dysthymic disorder    Ganglion, left wrist        Immunizations:     influenza, high-dose, quadrivalent (FLUZONE HIGH-DOSE QUAD 2020-21) 2/22/2021    zzPrevnar-13 5/26/2016    Fluzone High-Dose pf (>=65 yr) 11/29/2017    Fluzone High-Dose pf (>=65 yr) 9/26/2018    Fluzone High-Dose pf (>=65 yr) 10/28/2019    PNEUMOVAX 23 (Pneumococcal PPV23) 6/12/2017        Allergies:     Last Reviewed on 3/30/2021 02:56 PM by Natanael Cunha    Cats:      dogs:          Current Medications:     Last Reviewed on 3/30/2021 02:56 PM by Natanael Cunha    gabapentin 600 mg oral tablet [take 1 tablet (600 mg) by oral route 3 times per day]    meloxicam 15 mg oral tablet [take 1 tablet (15 mg) by oral route once daily]    Zoloft 50 mg oral tablet [Take 1 tablet by mouth once daily]    Lisinopril 20 mg oral tablet [1 tab daily]    HYDROcodone-acetaminophen  mg oral tablet [1 tab every 8 hours prn]    cyclobenzaprine 10 mg oral tablet [ 1 tablet AT HS PRN ]    vitamin d     Endur-C with dheeraj hips     vitamin b  complex daily     vitamin e daily     potassium         Objective:        Vitals:         Current: 3/29/2021 2:08:26 PM    Ht:  6 ft, 1.5 in;  Wt: 248.6 lbs;  BMI: 32.4T: 95.6 F (temporal);  BP: 148/64 mm Hg (right arm, sitting);  P: 78 bpm (left arm (BP Cuff), sitting);  sCr: 1.06 mg/dL;  GFR: 75.02        Exams:     PHYSICAL EXAM:     GENERAL: Vitals recorded well developed, well nourished;  well groomed;  no apparent distress;     NECK: range of motion is decreased;  trachea is midline; thyroid is non-palpable;     RESPIRATORY: normal respiratory rate and pattern with no distress; normal breath sounds with no rales, rhonchi, wheezes or rubs;     CARDIOVASCULAR: normal rate; rhythm is regular;  no systolic murmur; no edema;     GASTROINTESTINAL: nontender,  nondistended; no hepatosplenomegaly or masses; no bruits;     LYMPHATIC: no enlargement of cervical or facial nodes; no supraclavicular nodes;     MUSCULOSKELETAL: TWO SMALL GANGLIONS IN LEFT WRIST (5 X 5 MM);     NEUROLOGIC: GROSSLY INTACT     PSYCHIATRIC:  appropriate affect and demeanor; normal speech pattern; grossly normal memory;         Assessment:         I71.4   Abdominal aortic aneurysm (s/p repair)       I10   Essential (primary) hypertension       M67.432   Ganglion, left wrist           Plan:         Abdominal aortic aneurysm (s/p repair)        RECOMMENDATIONS given include: F/U WITH VASCULAR AS PLANNED.      FOLLOW-UP: Schedule a follow-up visit in 5 months.      D/C SUMMARY OF 3/18 REVIEWED Observe as improved         Essential (primary) hypertension        MEDICATIONS: (no change to current medication regimen)     RECOMMENDATIONS given include: perform routine monitoring of blood pressure with home blood pressure cuff.          Ganglion, left wrist        INFORMED HIM OF THE DATE/TIME OF HIS ORTHO APPT.              Patient Recommendations:        For  Abdominal aortic aneurysm (s/p repair):    Schedule a follow-up visit in 5 months.  I also recommend D/C SUMMARY OF 3/18 REVIEWED.          For  Essential (primary) hypertension:    Begin monitoring your blood pressure by brief nurse visits at our office, a home blood pressure monitor, or by checking on the machines in pharmacies or stores.  Keep a log of the readings.          For  Ganglion, left wrist:    I also recommend INFORMED HIM OF THE DATE/TIME OF HIS ORTHO APPT..              Charge Capture:         Primary Diagnosis:     I71.4  Abdominal aortic aneurysm (s/p repair)           Orders:      [id number]  Transitional care manage service 14 day discharge  (In-House)              I10  Essential (primary) hypertension     M67.432  Ganglion, left wrist

## 2021-07-01 VITALS
WEIGHT: 246.2 LBS | HEIGHT: 74 IN | SYSTOLIC BLOOD PRESSURE: 161 MMHG | DIASTOLIC BLOOD PRESSURE: 90 MMHG | HEART RATE: 68 BPM | TEMPERATURE: 97.5 F | BODY MASS INDEX: 31.6 KG/M2

## 2021-07-01 VITALS
HEIGHT: 74 IN | DIASTOLIC BLOOD PRESSURE: 63 MMHG | HEART RATE: 67 BPM | SYSTOLIC BLOOD PRESSURE: 104 MMHG | TEMPERATURE: 98.4 F | BODY MASS INDEX: 30.39 KG/M2 | WEIGHT: 236.8 LBS

## 2021-07-01 VITALS
HEIGHT: 74 IN | WEIGHT: 242.3 LBS | DIASTOLIC BLOOD PRESSURE: 73 MMHG | HEART RATE: 103 BPM | TEMPERATURE: 97.4 F | BODY MASS INDEX: 31.1 KG/M2 | SYSTOLIC BLOOD PRESSURE: 117 MMHG

## 2021-07-01 VITALS
BODY MASS INDEX: 30.31 KG/M2 | HEIGHT: 74 IN | HEART RATE: 83 BPM | SYSTOLIC BLOOD PRESSURE: 158 MMHG | DIASTOLIC BLOOD PRESSURE: 82 MMHG | WEIGHT: 236.2 LBS

## 2021-07-01 VITALS
OXYGEN SATURATION: 98 % | WEIGHT: 239.6 LBS | SYSTOLIC BLOOD PRESSURE: 111 MMHG | DIASTOLIC BLOOD PRESSURE: 67 MMHG | HEART RATE: 67 BPM | BODY MASS INDEX: 30.75 KG/M2 | HEIGHT: 74 IN | TEMPERATURE: 97.6 F

## 2021-07-01 VITALS
BODY MASS INDEX: 31.7 KG/M2 | TEMPERATURE: 97.9 F | WEIGHT: 247 LBS | HEART RATE: 69 BPM | SYSTOLIC BLOOD PRESSURE: 155 MMHG | DIASTOLIC BLOOD PRESSURE: 79 MMHG | HEIGHT: 74 IN

## 2021-07-01 VITALS
BODY MASS INDEX: 31.7 KG/M2 | HEIGHT: 74 IN | SYSTOLIC BLOOD PRESSURE: 129 MMHG | WEIGHT: 247 LBS | HEART RATE: 84 BPM | TEMPERATURE: 98.3 F | DIASTOLIC BLOOD PRESSURE: 83 MMHG

## 2021-07-01 VITALS
TEMPERATURE: 98 F | HEART RATE: 89 BPM | OXYGEN SATURATION: 98 % | SYSTOLIC BLOOD PRESSURE: 103 MMHG | WEIGHT: 244.8 LBS | BODY MASS INDEX: 31.42 KG/M2 | DIASTOLIC BLOOD PRESSURE: 65 MMHG | HEIGHT: 74 IN

## 2021-07-02 VITALS
TEMPERATURE: 95.6 F | SYSTOLIC BLOOD PRESSURE: 123 MMHG | WEIGHT: 250.2 LBS | HEIGHT: 74 IN | HEART RATE: 75 BPM | BODY MASS INDEX: 32.11 KG/M2 | DIASTOLIC BLOOD PRESSURE: 74 MMHG

## 2021-07-02 VITALS
WEIGHT: 248.6 LBS | SYSTOLIC BLOOD PRESSURE: 148 MMHG | HEIGHT: 74 IN | DIASTOLIC BLOOD PRESSURE: 64 MMHG | BODY MASS INDEX: 31.9 KG/M2 | TEMPERATURE: 95.6 F | HEART RATE: 78 BPM

## 2021-07-02 VITALS
HEART RATE: 63 BPM | WEIGHT: 240.5 LBS | TEMPERATURE: 97 F | SYSTOLIC BLOOD PRESSURE: 150 MMHG | HEIGHT: 74 IN | DIASTOLIC BLOOD PRESSURE: 67 MMHG | BODY MASS INDEX: 30.87 KG/M2

## 2021-07-02 VITALS
HEIGHT: 74 IN | HEART RATE: 70 BPM | BODY MASS INDEX: 31.52 KG/M2 | DIASTOLIC BLOOD PRESSURE: 77 MMHG | TEMPERATURE: 95.6 F | WEIGHT: 245.6 LBS | SYSTOLIC BLOOD PRESSURE: 150 MMHG

## 2021-07-06 PROBLEM — I10 HYPERTENSION: Status: ACTIVE | Noted: 2021-07-06

## 2021-07-06 PROBLEM — N47.1 PHIMOSIS: Status: ACTIVE | Noted: 2021-07-06

## 2021-07-06 PROBLEM — N48.0 BALANITIS XEROTICA OBLITERANS: Status: ACTIVE | Noted: 2021-07-06

## 2021-07-06 PROBLEM — F32.A DEPRESSION: Status: ACTIVE | Noted: 2021-07-06

## 2021-08-13 ENCOUNTER — TELEPHONE (OUTPATIENT)
Dept: FAMILY MEDICINE CLINIC | Age: 71
End: 2021-08-13

## 2021-08-13 NOTE — TELEPHONE ENCOUNTER
Caller: Ramirez Baltazar    Relationship: Self    Best call back number: [phone]      What was the call regarding: PATIENT WOULD LIKE TO SCHEDULE CT OF ABDOMEN FOLLOWING SURGERY. PATIENT ALSO STATES HE WOULD LIKE TO BE ABLE TO GO SEE DR MILLAN THE UROLOGIST AGAIN PLEASE ADVISE     Do you require a callback:YES

## 2021-08-25 ENCOUNTER — OFFICE VISIT (OUTPATIENT)
Dept: FAMILY MEDICINE CLINIC | Age: 71
End: 2021-08-25

## 2021-08-25 ENCOUNTER — LAB (OUTPATIENT)
Dept: LAB | Facility: HOSPITAL | Age: 71
End: 2021-08-25

## 2021-08-25 VITALS
DIASTOLIC BLOOD PRESSURE: 63 MMHG | BODY MASS INDEX: 29.47 KG/M2 | HEART RATE: 70 BPM | SYSTOLIC BLOOD PRESSURE: 103 MMHG | TEMPERATURE: 97.5 F | WEIGHT: 237 LBS | HEIGHT: 75 IN

## 2021-08-25 DIAGNOSIS — Z12.5 SCREENING PSA (PROSTATE SPECIFIC ANTIGEN): Primary | ICD-10-CM

## 2021-08-25 DIAGNOSIS — I10 ESSENTIAL HYPERTENSION: ICD-10-CM

## 2021-08-25 DIAGNOSIS — M25.532 PAIN IN JOINT OF LEFT WRIST: ICD-10-CM

## 2021-08-25 DIAGNOSIS — E78.00 ELEVATED CHOLESTEROL: ICD-10-CM

## 2021-08-25 DIAGNOSIS — F34.1 DYSTHYMIC DISORDER: ICD-10-CM

## 2021-08-25 PROBLEM — G25.5: Status: ACTIVE | Noted: 2021-08-25

## 2021-08-25 PROBLEM — N48.0 BALANITIS XEROTICA OBLITERANS: Status: RESOLVED | Noted: 2021-07-06 | Resolved: 2021-08-25

## 2021-08-25 PROBLEM — G25.5: Status: RESOLVED | Noted: 2021-08-25 | Resolved: 2021-08-25

## 2021-08-25 PROBLEM — M19.012 OSTEOARTHRITIS OF LEFT SHOULDER: Status: RESOLVED | Noted: 2019-06-24 | Resolved: 2021-08-25

## 2021-08-25 PROBLEM — M54.41 CHRONIC MIDLINE LOW BACK PAIN WITH BILATERAL SCIATICA: Status: ACTIVE | Noted: 2017-08-18

## 2021-08-25 PROBLEM — M19.032 OSTEOARTHRITIS OF LEFT WRIST: Status: RESOLVED | Noted: 2021-05-14 | Resolved: 2021-08-25

## 2021-08-25 PROBLEM — N47.1 PHIMOSIS: Status: RESOLVED | Noted: 2021-07-06 | Resolved: 2021-08-25

## 2021-08-25 PROBLEM — S63.8X2A TEAR OF LEFT SCAPHOLUNATE LIGAMENT: Status: RESOLVED | Noted: 2021-05-14 | Resolved: 2021-08-25

## 2021-08-25 PROBLEM — G89.29 CHRONIC MIDLINE LOW BACK PAIN WITH BILATERAL SCIATICA: Status: ACTIVE | Noted: 2017-08-18

## 2021-08-25 PROBLEM — M54.42 CHRONIC MIDLINE LOW BACK PAIN WITH BILATERAL SCIATICA: Status: ACTIVE | Noted: 2017-08-18

## 2021-08-25 PROBLEM — M79.89 SWELLING OF LEFT HAND: Status: RESOLVED | Noted: 2021-04-21 | Resolved: 2021-08-25

## 2021-08-25 PROBLEM — M25.432 PAIN AND SWELLING OF LEFT WRIST: Status: RESOLVED | Noted: 2021-04-21 | Resolved: 2021-08-25

## 2021-08-25 LAB
ALBUMIN SERPL-MCNC: 4.5 G/DL (ref 3.5–5.2)
ALBUMIN/GLOB SERPL: 1.6 G/DL
ALP SERPL-CCNC: 84 U/L (ref 39–117)
ALT SERPL W P-5'-P-CCNC: 21 U/L (ref 1–41)
ANION GAP SERPL CALCULATED.3IONS-SCNC: 8.8 MMOL/L (ref 5–15)
AST SERPL-CCNC: 18 U/L (ref 1–40)
BILIRUB SERPL-MCNC: 0.2 MG/DL (ref 0–1.2)
BUN SERPL-MCNC: 24 MG/DL (ref 8–23)
BUN/CREAT SERPL: 24.7 (ref 7–25)
CALCIUM SPEC-SCNC: 9.2 MG/DL (ref 8.6–10.5)
CHLORIDE SERPL-SCNC: 100 MMOL/L (ref 98–107)
CHOLEST SERPL-MCNC: 209 MG/DL (ref 0–200)
CO2 SERPL-SCNC: 28.2 MMOL/L (ref 22–29)
CREAT SERPL-MCNC: 0.97 MG/DL (ref 0.76–1.27)
DEPRECATED RDW RBC AUTO: 45.6 FL (ref 37–54)
ERYTHROCYTE [DISTWIDTH] IN BLOOD BY AUTOMATED COUNT: 13.5 % (ref 12.3–15.4)
GFR SERPL CREATININE-BSD FRML MDRD: 77 ML/MIN/1.73
GLOBULIN UR ELPH-MCNC: 2.8 GM/DL
GLUCOSE SERPL-MCNC: 97 MG/DL (ref 65–99)
HCT VFR BLD AUTO: 41.8 % (ref 37.5–51)
HDLC SERPL-MCNC: 34 MG/DL (ref 40–60)
HGB BLD-MCNC: 13.9 G/DL (ref 13–17.7)
LDLC SERPL CALC-MCNC: 98 MG/DL (ref 0–100)
LDLC/HDLC SERPL: 2.46 {RATIO}
MCH RBC QN AUTO: 30.2 PG (ref 26.6–33)
MCHC RBC AUTO-ENTMCNC: 33.3 G/DL (ref 31.5–35.7)
MCV RBC AUTO: 90.9 FL (ref 79–97)
PLATELET # BLD AUTO: 181 10*3/MM3 (ref 140–450)
PMV BLD AUTO: 9.3 FL (ref 6–12)
POTASSIUM SERPL-SCNC: 4.8 MMOL/L (ref 3.5–5.2)
PROT SERPL-MCNC: 7.3 G/DL (ref 6–8.5)
PSA SERPL-MCNC: 1.01 NG/ML (ref 0–4)
RBC # BLD AUTO: 4.6 10*6/MM3 (ref 4.14–5.8)
SODIUM SERPL-SCNC: 137 MMOL/L (ref 136–145)
TRIGL SERPL-MCNC: 457 MG/DL (ref 0–150)
TSH SERPL DL<=0.05 MIU/L-ACNC: 1.46 UIU/ML (ref 0.27–4.2)
VLDLC SERPL-MCNC: 77 MG/DL (ref 5–40)
WBC # BLD AUTO: 7.66 10*3/MM3 (ref 3.4–10.8)

## 2021-08-25 PROCEDURE — G0103 PSA SCREENING: HCPCS | Performed by: FAMILY MEDICINE

## 2021-08-25 PROCEDURE — 80061 LIPID PANEL: CPT | Performed by: FAMILY MEDICINE

## 2021-08-25 PROCEDURE — 80053 COMPREHEN METABOLIC PANEL: CPT | Performed by: FAMILY MEDICINE

## 2021-08-25 PROCEDURE — 85027 COMPLETE CBC AUTOMATED: CPT | Performed by: FAMILY MEDICINE

## 2021-08-25 PROCEDURE — 99214 OFFICE O/P EST MOD 30 MIN: CPT | Performed by: FAMILY MEDICINE

## 2021-08-25 PROCEDURE — 84443 ASSAY THYROID STIM HORMONE: CPT | Performed by: FAMILY MEDICINE

## 2021-08-25 PROCEDURE — 36415 COLL VENOUS BLD VENIPUNCTURE: CPT | Performed by: FAMILY MEDICINE

## 2021-08-25 RX ORDER — LISINOPRIL 20 MG/1
20 TABLET ORAL DAILY
Qty: 90 TABLET | Refills: 3 | Status: SHIPPED | OUTPATIENT
Start: 2021-08-25 | End: 2022-08-26 | Stop reason: SDUPTHER

## 2021-08-25 RX ORDER — DICLOFENAC SODIUM 75 MG/1
75 TABLET, DELAYED RELEASE ORAL 2 TIMES DAILY
COMMUNITY
Start: 2021-07-09 | End: 2022-07-21

## 2021-08-25 NOTE — ASSESSMENT & PLAN NOTE
Patient's depression is single episode and is moderate without psychosis. Their depression is currently in partial remission and the condition is improving with treatment. This will be reassessed at the next regular appointment. F/U as described:patient will continue current medication therapy.

## 2021-08-25 NOTE — PROGRESS NOTES
"Chief Complaint  Wrist Pain (6 month follow up)    Subjective          Ramirez Baltazar presents to [facility]  --TOLERATING BP MEDS WITHOUT APPARENT SIDE EFFECTS  --INJURED LEFT WRIST A FEW WEEKS AGO.  STATES X-RAYS WERE NEGATIVE BUT MRI SHOWED \"TORN LIGAMENTS\".  FEELING BETTER WITH A SPLINT  --THE SSRI IS MANAGING HIS DEPRESSION VERY WELL  --DUE FOR A REPEAT LIPID PANEL  --DUE FOR A PSA AND OTHER ROUTINE LABS  --FOLLOWED BY PAIN MGMT FOR HIS CHRONIC LOW BACK PAIN           No Known Allergies     Health Maintenance Due   Topic Date Due   • COLORECTAL CANCER SCREENING  Never done   • TDAP/TD VACCINES (1 - Tdap) Never done   • ZOSTER VACCINE (1 of 2) Never done   • Pneumococcal Vaccine 65+ (1 of 1 - PPSV23) Never done   • LIPID PANEL  08/25/2021        Current Outpatient Medications on File Prior to Visit   Medication Sig   • baclofen (LIORESAL) 10 MG tablet Take 10 mg by mouth 3 (Three) Times a Day.   • gabapentin (NEURONTIN) 600 MG tablet Take 600 mg by mouth 3 (Three) Times a Day.   • HYDROcodone-acetaminophen (NORCO)  MG per tablet Take 1 tablet by mouth Every 6 (Six) Hours As Needed for Moderate Pain .   • meloxicam (MOBIC) 15 MG tablet Take 15 mg by mouth Daily As Needed.   • Multiple Vitamins-Minerals (MULTIVITAMIN ADULT PO) Take 1 tablet by mouth Daily.   • Potassium 75 MG tablet Take 1 tablet by mouth Daily.   • sertraline (ZOLOFT) 50 MG tablet Take 1 tablet by mouth once daily   • Vitamin D, Cholecalciferol, (CHOLECALCIFEROL) 10 MCG (400 UNIT) tablet Take 400 Units by mouth Daily.   • vitamin E 100 UNIT capsule Take 100 Units by mouth Daily.   • [DISCONTINUED] lisinopril (PRINIVIL,ZESTRIL) 20 MG tablet Take 20 mg by mouth Daily.   • B Complex Vitamins (Vitamin B Complex) tablet Take 1 tablet by mouth Daily.   • diclofenac (VOLTAREN) 75 MG EC tablet Take 75 mg by mouth 2 (Two) Times a Day.   • doxycycline (VIBRAMYCIN) 100 MG capsule Take 100 mg by mouth Daily.   • " "potassium chloride (K-DUR) 10 MEQ CR tablet Take 10 mEq by mouth 2 (Two) Times a Day.   • sennosides-docusate sodium (SENOKOT-S) 8.6-50 MG tablet Take 2 tablets by mouth 2 (Two) Times a Day.     No current facility-administered medications on file prior to visit.       Immunization History   Administered Date(s) Administered   • COVID-19 (MODERNA) 06/25/2021, 07/22/2021   • Fluzone High Dose =>65 Years ([employer] ONLY) 11/29/2017       Review of Systems   Constitutional: Negative for appetite change, chills, fatigue and fever.   HENT: Positive for hearing loss. Negative for ear pain, rhinorrhea and sore throat.    Respiratory: Negative for cough and shortness of breath.    Cardiovascular: Negative for chest pain, palpitations and leg swelling.   Gastrointestinal: Negative for abdominal pain, nausea and vomiting.   Genitourinary: Negative for dysuria and hematuria.   Musculoskeletal: Negative for arthralgias and myalgias.   Neurological: Negative for headache.   Psychiatric/Behavioral: Negative for depressed mood.        Objective     /63 (BP Location: Left arm, Patient Position: Sitting)   Pulse 70   Temp 97.5 °F (36.4 °C) (Oral)   Ht 190.5 cm (75\")   Wt 108 kg (237 lb)   BMI 29.62 kg/m²       Physical Exam  Vitals and nursing note reviewed.   Constitutional:       General: He is not in acute distress.     Appearance: Normal appearance.   Cardiovascular:      Rate and Rhythm: Normal rate and regular rhythm.      Heart sounds: No murmur heard.     Pulmonary:      Effort: Pulmonary effort is normal.      Breath sounds: Normal breath sounds.   Abdominal:      Palpations: Abdomen is soft.      Tenderness: There is no abdominal tenderness.   Musculoskeletal:         General: No swelling.      Cervical back: Neck supple.   Lymphadenopathy:      Cervical: No cervical adenopathy.   Neurological:      General: No focal deficit present.      Mental Status: He is alert.      Cranial Nerves: No cranial nerve deficit. "      Coordination: Coordination normal.      Gait: Gait normal.   Psychiatric:         Mood and Affect: Mood normal.         Behavior: Behavior normal.         Result Review :                             Assessment and Plan      Diagnoses and all orders for this visit:    1. Screening PSA (prostate specific antigen) (Primary)  -     PSA Screen    2. Essential hypertension  Assessment & Plan:  Hypertension is improving with treatment.  Continue current treatment regimen.  Dietary sodium restriction.  Weight loss.  Regular aerobic exercise.  Blood pressure will be reassessed at the next regular appointment.    Orders:  -     Comprehensive Metabolic Panel  -     CBC (No Diff)  -     TSH    3. Elevated cholesterol  Assessment & Plan:  Lipid abnormalities are improving with lifestyle modifications.  Nutritional counseling was provided.  Lipids will be reassessed in 6 months.    Orders:  -     Lipid Panel    4. Dysthymic disorder  Assessment & Plan:  Patient's depression is single episode and is moderate without psychosis. Their depression is currently in partial remission and the condition is improving with treatment. This will be reassessed at the next regular appointment. F/U as described:patient will continue current medication therapy.      5. Pain in joint of left wrist  Assessment & Plan:  CONTINUE USING SPLINT, OBSERVE AS IMPROVED.  FURTHER PLANS PER ORTHO       Other orders  -     lisinopril (PRINIVIL,ZESTRIL) 20 MG tablet; Take 1 tablet by mouth Daily.  Dispense: 90 tablet; Refill: 3          Follow Up     Return in about 6 months (around 2/25/2022).    Patient was given instructions and counseling regarding his condition or for health maintenance advice. Please see specific information pulled into the AVS if appropriate.

## 2021-11-09 ENCOUNTER — TELEPHONE (OUTPATIENT)
Dept: FAMILY MEDICINE CLINIC | Age: 71
End: 2021-11-09

## 2021-11-09 ENCOUNTER — OFFICE VISIT (OUTPATIENT)
Dept: FAMILY MEDICINE CLINIC | Age: 71
End: 2021-11-09

## 2021-11-09 VITALS
SYSTOLIC BLOOD PRESSURE: 143 MMHG | HEART RATE: 71 BPM | TEMPERATURE: 97.6 F | HEIGHT: 75 IN | WEIGHT: 248 LBS | BODY MASS INDEX: 30.84 KG/M2 | DIASTOLIC BLOOD PRESSURE: 74 MMHG

## 2021-11-09 DIAGNOSIS — H60.502 ACUTE OTITIS EXTERNA OF LEFT EAR, UNSPECIFIED TYPE: Primary | ICD-10-CM

## 2021-11-09 DIAGNOSIS — Z23 IMMUNIZATION DUE: ICD-10-CM

## 2021-11-09 PROCEDURE — 99213 OFFICE O/P EST LOW 20 MIN: CPT | Performed by: NURSE PRACTITIONER

## 2021-11-09 PROCEDURE — G0008 ADMIN INFLUENZA VIRUS VAC: HCPCS | Performed by: NURSE PRACTITIONER

## 2021-11-09 PROCEDURE — 90662 IIV NO PRSV INCREASED AG IM: CPT | Performed by: NURSE PRACTITIONER

## 2021-11-09 RX ORDER — CHLORAL HYDRATE 500 MG
1 CAPSULE ORAL DAILY
COMMUNITY

## 2021-11-09 RX ORDER — CIPROFLOXACIN AND DEXAMETHASONE 3; 1 MG/ML; MG/ML
4 SUSPENSION/ DROPS AURICULAR (OTIC) 2 TIMES DAILY
Qty: 7.5 ML | Refills: 0 | Status: SHIPPED | OUTPATIENT
Start: 2021-11-09 | End: 2021-11-09

## 2021-11-09 RX ORDER — OFLOXACIN 3 MG/ML
10 SOLUTION AURICULAR (OTIC) DAILY
Qty: 10 ML | Refills: 0 | Status: SHIPPED | OUTPATIENT
Start: 2021-11-09 | End: 2021-11-16

## 2021-11-09 NOTE — PROGRESS NOTES
"Chief Complaint  Earache (right ear x 2 days)    Subjective          Ramirez Baltazar presents to [facility] for right earache x2 days.  Patient states he woke yesterday morning with bloody drainage.  No injury.  Has been using an ear plug to keep cold air out.          Objective   Vital Signs:   /74 (BP Location: Right arm, Patient Position: Sitting, Cuff Size: Large Adult)   Pulse 71   Temp 97.6 °F (36.4 °C) (Oral)   Ht 190.5 cm (75\")   Wt 112 kg (248 lb)   BMI 31.00 kg/m²     Physical Exam  Vitals reviewed.   Constitutional:       Appearance: Normal appearance. He is well-developed.   HENT:      Head: Normocephalic and atraumatic.      Right Ear: Tympanic membrane, ear canal and external ear normal.      Left Ear: Tympanic membrane and external ear normal.      Ears:      Comments: Left canal edematous, erythematous, tender with drainage  Eyes:      Conjunctiva/sclera: Conjunctivae normal.      Pupils: Pupils are equal, round, and reactive to light.   Cardiovascular:      Rate and Rhythm: Normal rate and regular rhythm.      Heart sounds: Normal heart sounds. No murmur heard.      Pulmonary:      Effort: Pulmonary effort is normal. No respiratory distress.      Breath sounds: Normal breath sounds. No wheezing, rhonchi or rales.   Musculoskeletal:      Cervical back: Full passive range of motion without pain.   Skin:     General: Skin is warm and dry.   Neurological:      Mental Status: He is alert and oriented to person, place, and time.   Psychiatric:         Mood and Affect: Mood and affect normal.         Behavior: Behavior normal.         Thought Content: Thought content normal.         Judgment: Judgment normal.          Result Review :              Assessment and Plan    Diagnoses and all orders for this visit:    1. Acute otitis externa of left ear, unspecified type (Primary)    Other orders  -     ciprofloxacin-dexamethasone (Ciprodex) 0.3-0.1 % otic " suspension; Administer 4 drops to the right ear 2 (Two) Times a Day.  Dispense: 7.5 mL; Refill: 0    Patient to complete eardrops x1 week.  Tylenol/ibuprofen for discomfort.  Warm compresses.  Do not use earplugs or Q-tips.  Use earmuffs instead of earplugs.  Patient to notify office with any acute concerns or issues.  Patient verbalizes understanding, agrees with plan of care and has no further questions upon discharge.    Please note that portions of this note were completed with a voice recognition program.    Follow Up    Return if symptoms worsen or fail to improve.  Patient was given instructions and counseling regarding his condition or for health maintenance advice. Please see specific information pulled into the AVS if appropriate.

## 2021-11-10 ENCOUNTER — TELEPHONE (OUTPATIENT)
Dept: FAMILY MEDICINE CLINIC | Age: 71
End: 2021-11-10

## 2021-11-10 NOTE — TELEPHONE ENCOUNTER
HUB TO READ    PHARMACY NEEDS CLARIFICATION ON A MEDICATION FROM [facility] ON THIS PT. NEEDS A CALL BACK.

## 2022-02-25 ENCOUNTER — OFFICE VISIT (OUTPATIENT)
Dept: FAMILY MEDICINE CLINIC | Age: 72
End: 2022-02-25

## 2022-02-25 VITALS
TEMPERATURE: 97.9 F | HEART RATE: 72 BPM | WEIGHT: 250.4 LBS | BODY MASS INDEX: 31.13 KG/M2 | DIASTOLIC BLOOD PRESSURE: 70 MMHG | SYSTOLIC BLOOD PRESSURE: 129 MMHG | HEIGHT: 75 IN

## 2022-02-25 DIAGNOSIS — Z00.00 MEDICARE ANNUAL WELLNESS VISIT, SUBSEQUENT: Primary | ICD-10-CM

## 2022-02-25 DIAGNOSIS — M54.41 CHRONIC MIDLINE LOW BACK PAIN WITH BILATERAL SCIATICA: ICD-10-CM

## 2022-02-25 DIAGNOSIS — E78.00 ELEVATED CHOLESTEROL: ICD-10-CM

## 2022-02-25 DIAGNOSIS — G89.29 CHRONIC MIDLINE LOW BACK PAIN WITH BILATERAL SCIATICA: ICD-10-CM

## 2022-02-25 DIAGNOSIS — J01.00 ACUTE NON-RECURRENT MAXILLARY SINUSITIS: ICD-10-CM

## 2022-02-25 DIAGNOSIS — M54.42 CHRONIC MIDLINE LOW BACK PAIN WITH BILATERAL SCIATICA: ICD-10-CM

## 2022-02-25 DIAGNOSIS — I10 ESSENTIAL HYPERTENSION: ICD-10-CM

## 2022-02-25 PROBLEM — M25.532 PAIN IN JOINT OF LEFT WRIST: Status: RESOLVED | Noted: 2021-04-21 | Resolved: 2022-02-25

## 2022-02-25 PROCEDURE — 99214 OFFICE O/P EST MOD 30 MIN: CPT | Performed by: FAMILY MEDICINE

## 2022-02-25 PROCEDURE — 1159F MED LIST DOCD IN RCRD: CPT | Performed by: FAMILY MEDICINE

## 2022-02-25 PROCEDURE — G0439 PPPS, SUBSEQ VISIT: HCPCS | Performed by: FAMILY MEDICINE

## 2022-02-25 PROCEDURE — 1126F AMNT PAIN NOTED NONE PRSNT: CPT | Performed by: FAMILY MEDICINE

## 2022-02-25 PROCEDURE — 1170F FXNL STATUS ASSESSED: CPT | Performed by: FAMILY MEDICINE

## 2022-02-25 RX ORDER — CEPHALEXIN 500 MG/1
500 CAPSULE ORAL 3 TIMES DAILY
Qty: 30 CAPSULE | Refills: 0 | Status: SHIPPED | OUTPATIENT
Start: 2022-02-25 | End: 2022-09-16

## 2022-02-25 NOTE — ASSESSMENT & PLAN NOTE
WILL COVER AS NOTED.  FLUIDS, REST, OTC MEDS PRN.  CONSIDER FURTHER WORKUP IF SYMPTOMS PERSIST OR WORSEN

## 2022-02-25 NOTE — PROGRESS NOTES
The ABCs of the Annual Wellness Visit  Subsequent Medicare Wellness Visit    Chief Complaint   Patient presents with   • Medicare Wellness-subsequent      Subjective    History of Present Illness:  Ramirez Baltazar is a 71 y.o. male who presents for a Subsequent Medicare Wellness Visit.    --TOLERATING BP MEDS WITHOUT APPARENT SIDE EFFECTS  --LAST LIPIDS WERE BORDERLINE ON DIETARY EFFORTS ONLY  --IS FOLLOWED BY [facility]IN MGMT FOR HIS CHRONIC LOW BACK PAIN, STABLE  --FOR THE PAST WEEK HE HAS HAD SINUS PRSSURE WITH PURULENT NASAL DRAINAGE    The following portions of the patient's history were reviewed and   updated as appropriate: allergies, current medications, past family history, past medical history, past social history, past surgical history and problem list.    Compared to one year ago, the patient feels his physical   health is the same.    Compared to one year ago, the patient feels his mental   health is the same.    Recent Hospitalizations:  This patient has had a [facility] admission record on file within the last 365 days.    Current Medical Providers:  Patient Care Team:  Natanael Cunha MD as PCP - General (Family Medicine)    Outpatient Medications Prior to Visit   Medication Sig Dispense Refill   • B Complex Vitamins (Vitamin B Complex) tablet Take 1 tablet by mouth Daily.     • diclofenac (VOLTAREN) 75 MG EC tablet Take 75 mg by mouth 2 (Two) Times a Day.     • gabapentin (NEURONTIN) 600 MG tablet Take 600 mg by mouth 3 (Three) Times a Day.     • HYDROcodone-acetaminophen (NORCO)  MG per tablet Take 1 tablet by mouth Every 6 (Six) Hours As Needed for Moderate Pain . 7.5mg     • lisinopril (PRINIVIL,ZESTRIL) 20 MG tablet Take 1 tablet by mouth Daily. 90 tablet 3   • Multiple Vitamins-Minerals (MULTIVITAMIN ADULT PO) Take 1 tablet by mouth Daily.     • Omega-3 1000 MG capsule Take  by mouth.     • Potassium 75 MG tablet Take 1 tablet by mouth Daily.     • sertraline (ZOLOFT) 50  MG tablet Take 1 tablet by mouth once daily 90 tablet 3   • Vitamin D, Cholecalciferol, (CHOLECALCIFEROL) 10 MCG (400 UNIT) tablet Take 400 Units by mouth Daily.     • vitamin E 100 UNIT capsule Take 100 Units by mouth Daily.       No facility-administered medications prior to visit.       Opioid medication/s are on active medication list.  and I have evaluated his active treatment plan and pain score trends (see table).  Vitals:    02/25/22 0954   PainSc: 0-No pain     I have reviewed the chart for potential of high risk medication and harmful drug interactions in the elderly.            Aspirin is not on active medication list.  Aspirin use is not indicated based on review of current medical condition/s. Risk of harm outweighs potential benefits.  .    Patient Active Problem List   Diagnosis   • Chronic midline low back pain with bilateral sciatica   • Essential hypertension   • Dysthymic disorder   • Elevated cholesterol   • Medicare annual wellness visit, subsequent   • Acute non-recurrent maxillary sinusitis     Advance Care Planning  Advance Directive is not on file.  ACP discussion was held with the patient during this visit. Patient has an advance directive (not in EMR), copy requested.    Review of Systems   Constitutional: Negative for activity change, appetite change, chills, fatigue and fever.   HENT: Positive for congestion, hearing loss, rhinorrhea and sinus pressure. Negative for ear pain and sore throat.    Eyes: Negative for discharge and visual disturbance.   Respiratory: Negative for cough and shortness of breath.    Cardiovascular: Negative for chest pain, palpitations and leg swelling.   Gastrointestinal: Negative for abdominal pain, nausea and vomiting.   Genitourinary: Negative for dysuria and hematuria.   Musculoskeletal: Positive for arthralgias. Negative for myalgias.   Psychiatric/Behavioral: Negative for dysphoric mood.        Objective    Vitals:    02/25/22 0954   BP: 129/70   BP  "Location: Left arm   Patient Position: Sitting   Pulse: 72   Temp: 97.9 °F (36.6 °C)   TempSrc: Oral   Weight: 114 kg (250 lb 6.4 oz)   Height: 190.5 cm (75\")   PainSc: 0-No pain     BMI Readings from Last 1 Encounters:   22 31.30 kg/m²   BMI is above normal parameters. Recommendations include: nutrition counseling    Does the patient have evidence of cognitive impairment? No    Physical Exam  Vitals and nursing note reviewed.   Constitutional:       General: He is not in acute distress.     Appearance: Normal appearance.   HENT:      Right Ear: Tympanic membrane normal.      Left Ear: Tympanic membrane normal.      Nose:      Comments: TENDER MAXILLARY SINUSES      Mouth/Throat:      Pharynx: Oropharynx is clear.   Eyes:      Conjunctiva/sclera: Conjunctivae normal.   Cardiovascular:      Rate and Rhythm: Normal rate and regular rhythm.      Heart sounds: Normal heart sounds. No murmur heard.      Pulmonary:      Effort: Pulmonary effort is normal.      Breath sounds: Normal breath sounds.   Abdominal:      General: Bowel sounds are normal.      Palpations: Abdomen is soft.      Tenderness: There is no abdominal tenderness.   Musculoskeletal:      Cervical back: Neck supple.      Right lower leg: No edema.      Left lower leg: No edema.   Lymphadenopathy:      Cervical: No cervical adenopathy.   Neurological:      General: No focal deficit present.      Mental Status: He is alert.      Cranial Nerves: No cranial nerve deficit.      Coordination: Coordination normal.      Gait: Gait normal.   Psychiatric:         Mood and Affect: Mood normal.         Behavior: Behavior normal.                 HEALTH RISK ASSESSMENT    Smoking Status:  Social History     Tobacco Use   Smoking Status Former Smoker   • Packs/day: 4.50   • Years: 30.00   • Pack years: 135.00   • Types: Cigarettes   • Quit date:    • Years since quittin.1   Smokeless Tobacco Never Used   Tobacco Comment    1 TO 4 PPD X 30 YRS      Alcohol " Consumption:  Social History     Substance and Sexual Activity   Alcohol Use Yes    Comment: OCCASIONAL     Fall Risk Screen:    SHAN Fall Risk Assessment was completed, and patient is at LOW risk for falls.Assessment completed on:2/25/2022    Depression Screening:  PHQ-2/PHQ-9 Depression Screening 2/25/2022   Little interest or pleasure in doing things 0   Feeling down, depressed, or hopeless 0   Total Score 0       Health Habits and Functional and Cognitive Screening:  Functional & Cognitive Status 2/25/2022   Do you have difficulty preparing food and eating? No   Do you have difficulty bathing yourself, getting dressed or grooming yourself? No   Do you have difficulty using the toilet? No   Do you have difficulty moving around from place to place? No   Do you have trouble with steps or getting out of a bed or a chair? No   Current Diet Well Balanced Diet   Dental Exam Not up to date   Eye Exam Up to date   Exercise (times per week) 6 times per week   Current Exercises Include Walking   Do you need help using the phone?  No   Are you deaf or do you have serious difficulty hearing?  No   Do you need help with transportation? No   Do you need help shopping? No   Do you need help preparing meals?  No   Do you need help with housework?  No   Do you need help with laundry? No   Do you need help taking your medications? No   Do you need help managing money? No   Do you ever drive or ride in a car without wearing a seat belt? No   Have you felt unusual stress, anger or loneliness in the last month? No   Who do you live with? Spouse   If you need help, do you have trouble finding someone available to you? No   Do you have difficulty concentrating, remembering or making decisions? No       Age-appropriate Screening Schedule:  Refer to the list below for future screening recommendations based on patient's age, sex and/or medical conditions. Orders for these recommended tests are listed in the plan section. The patient has  been provided with a written plan.    Health Maintenance   Topic Date Due   • TDAP/TD VACCINES (1 - Tdap) Never done   • ZOSTER VACCINE (1 of 2) Never done   • LIPID PANEL  08/25/2022   • INFLUENZA VACCINE  Completed              Assessment/Plan   CMS Preventative Services Quick Reference  Risk Factors Identified During Encounter  Cardiovascular Disease  Chronic Pain   Dementia/Memory   Depression/Dysphoria  Fall Risk-High or Moderate  Glaucoma or Family History of Glaucoma  Hearing Problem  Immunizations Discussed/Encouraged (specific Immunizations; Influenza and COVID19  The above risks/problems have been discussed with the patient.  Follow up actions/plans if indicated are seen below in the Assessment/Plan Section.  Pertinent information has been shared with the patient in the After Visit Summary.    Diagnoses and all orders for this visit:    1. Medicare annual wellness visit, subsequent (Primary)  Assessment & Plan:  ADVICE GIVEN RE:  SEATBELT USE, ALCOHOL USE, HEALTHY DIET, ROUTINE EYE AND DENTAL EXAM. ADVICE GIVEN RE:  SEATBELT USE, ALCOHOL USE, HEALTHY DIET, ROUTINE EYE AND DENTAL EXAM. ADVICE GIVEN RE:  SEATBELT USE, ALCOHOL USE, HEALTHY DIET, ROUTINE EYE AND DENTAL EXAM. ADVICE GIVEN RE:  SEATBELT USE, ALCOHOL USE, HEALTHY DIET, ROUTINE EYE AND DENTAL EXAMS, ROUTINE VACCINATIONS, TESTICULAR SELF-EXAMINATION       2. Essential hypertension  Assessment & Plan:  Hypertension is improving with treatment.  Continue current treatment regimen.  Dietary sodium restriction.  Weight loss.  Regular aerobic exercise.  Continue current medications.  Blood pressure will be reassessed at the next regular appointment.      3. Elevated cholesterol  Assessment & Plan:  Lipid abnormalities are improving with lifestyle modifications.  Nutritional counseling was provided.  Lipids will be reassessed in 6 months.      4. Chronic midline low back pain with bilateral sciatica  Assessment & Plan:  CONTINUE MEDS AND TREATMENTS PER  PAIN MGMT (OFFICE NOTES REVIEWED)       5. Acute non-recurrent maxillary sinusitis  Assessment & Plan:  WILL COVER AS NOTED.  FLUIDS, REST, OTC MEDS PRN.  CONSIDER FURTHER WORKUP IF SYMPTOMS PERSIST OR WORSEN     Orders:  -     cephalexin (Keflex) 500 MG capsule; Take 1 capsule by mouth 3 (Three) Times a Day.  Dispense: 30 capsule; Refill: 0      Follow Up:   Return in about 6 months (around 8/25/2022).     An After Visit Summary and PPPS were made available to the patient.

## 2022-02-25 NOTE — ASSESSMENT & PLAN NOTE
ADVICE GIVEN RE:  SEATBELT USE, ALCOHOL USE, HEALTHY DIET, ROUTINE EYE AND DENTAL EXAM. ADVICE GIVEN RE:  SEATBELT USE, ALCOHOL USE, HEALTHY DIET, ROUTINE EYE AND DENTAL EXAM. ADVICE GIVEN RE:  SEATBELT USE, ALCOHOL USE, HEALTHY DIET, ROUTINE EYE AND DENTAL EXAM. ADVICE GIVEN RE:  SEATBELT USE, ALCOHOL USE, HEALTHY DIET, ROUTINE EYE AND DENTAL EXAMS, ROUTINE VACCINATIONS, TESTICULAR SELF-EXAMINATION

## 2022-05-05 ENCOUNTER — TRANSCRIBE ORDERS (OUTPATIENT)
Dept: ADMINISTRATIVE | Facility: HOSPITAL | Age: 72
End: 2022-05-05

## 2022-05-05 DIAGNOSIS — M25.532 LEFT WRIST PAIN: Primary | ICD-10-CM

## 2022-05-26 ENCOUNTER — HOSPITAL ENCOUNTER (OUTPATIENT)
Dept: MRI IMAGING | Facility: HOSPITAL | Age: 72
Discharge: HOME | End: 2022-05-26
Admitting: ORTHOPAEDIC SURGERY

## 2022-05-26 DIAGNOSIS — M25.532 LEFT WRIST PAIN: ICD-10-CM

## 2022-05-26 PROCEDURE — 73221 MRI JOINT UPR EXTREM W/O DYE: CPT

## 2022-07-21 ENCOUNTER — OFFICE VISIT (OUTPATIENT)
Dept: FAMILY MEDICINE CLINIC | Age: 72
End: 2022-07-21

## 2022-07-21 VITALS
TEMPERATURE: 98.3 F | HEART RATE: 66 BPM | DIASTOLIC BLOOD PRESSURE: 74 MMHG | HEIGHT: 75 IN | WEIGHT: 239 LBS | BODY MASS INDEX: 29.72 KG/M2 | SYSTOLIC BLOOD PRESSURE: 129 MMHG

## 2022-07-21 DIAGNOSIS — N41.9 PROSTATITIS, UNSPECIFIED PROSTATITIS TYPE: ICD-10-CM

## 2022-07-21 DIAGNOSIS — N39.9 UROLOGICAL DISORDER: Primary | ICD-10-CM

## 2022-07-21 LAB
BILIRUB BLD-MCNC: NEGATIVE MG/DL
CLARITY, POC: CLEAR
COLOR UR: NORMAL
EXPIRATION DATE: NORMAL
GLUCOSE UR STRIP-MCNC: NEGATIVE MG/DL
KETONES UR QL: NEGATIVE
LEUKOCYTE EST, POC: NEGATIVE
Lab: NORMAL
NITRITE UR-MCNC: NEGATIVE MG/ML
PH UR: 6 [PH] (ref 5–8)
PROT UR STRIP-MCNC: NEGATIVE MG/DL
RBC # UR STRIP: NEGATIVE /UL
SP GR UR: 1.02 (ref 1–1.03)
UROBILINOGEN UR QL: NORMAL

## 2022-07-21 PROCEDURE — 99214 OFFICE O/P EST MOD 30 MIN: CPT | Performed by: NURSE PRACTITIONER

## 2022-07-21 PROCEDURE — 81003 URINALYSIS AUTO W/O SCOPE: CPT | Performed by: NURSE PRACTITIONER

## 2022-07-21 RX ORDER — CIPROFLOXACIN 500 MG/1
500 TABLET, FILM COATED ORAL 2 TIMES DAILY
Qty: 20 TABLET | Refills: 0 | Status: SHIPPED | OUTPATIENT
Start: 2022-07-21 | End: 2022-09-08

## 2022-07-21 NOTE — PROGRESS NOTES
"Chief Complaint  Ramirez Baltazar presents to [facility] for Difficulty Urinating (Discuss prostate issues, takes a while to urinate, X 1 month )    Subjective          History of Present Illness    Ramirez reports that he saw urology Dr Alcaraz about 5 years and had circumcision/surgery. He feels that \"pee hole is starting to grow back lynette\"t recently. He has had some urinary dribbling over the past 6-8 weeks. Denies blood in urine. Mild burning with urination. He notes that urine is slow to come out with urination. Normal PSA 8/25/2021. He called urology office and was told that he needed referral to be seen.    Review of Systems      No Known Allergies   Past Medical History:   Diagnosis Date   • Anxiety    • Arthritis    • Hypertension    • Left shoulder pain    • Low back pain     DOWN LEFT LEG   • Multiple bruises     LEFT ARM - SKIN INTACT     Current Outpatient Medications   Medication Sig Dispense Refill   • gabapentin (NEURONTIN) 600 MG tablet Take 600 mg by mouth 3 (Three) Times a Day.     • HYDROcodone-acetaminophen (NORCO)  MG per tablet Take 1 tablet by mouth Every 6 (Six) Hours As Needed for Moderate Pain . 7.5mg     • lisinopril (PRINIVIL,ZESTRIL) 20 MG tablet Take 1 tablet by mouth Daily. 90 tablet 3   • Multiple Vitamins-Minerals (MULTIVITAMIN ADULT PO) Take 1 tablet by mouth Daily.     • Omega-3 1000 MG capsule Take  by mouth Daily.     • Potassium 75 MG tablet Take 1 tablet by mouth Daily.     • sertraline (ZOLOFT) 50 MG tablet Take 1 tablet by mouth once daily 90 tablet 3   • Vitamin D, Cholecalciferol, (CHOLECALCIFEROL) 10 MCG (400 UNIT) tablet Take 400 Units by mouth Daily.     • vitamin E 100 UNIT capsule Take 100 Units by mouth Daily.     • cephalexin (Keflex) 500 MG capsule Take 1 capsule by mouth 3 (Three) Times a Day. 30 capsule 0   • ciprofloxacin (Cipro) 500 MG tablet Take 1 tablet by mouth 2 (Two) Times a Day. 20 tablet 0     No current " "facility-administered medications for this visit.     Past Surgical History:   Procedure Laterality Date   • CATARACT EXTRACTION Right    • COLONOSCOPY      NORMAL   • OTHER SURGICAL HISTORY      SCROTAL REPAIR D/T ACCIDENT   • TONSILLECTOMY     • TOTAL SHOULDER ARTHROPLASTY W/ DISTAL CLAVICLE EXCISION Left 2019    Procedure: TOTAL SHOULDER REVERSE ARTHROPLASTY;  Surgeon: Enoch Goldman MD;  Location: [facility];  Service: Orthopedics      Social History     Tobacco Use   • Smoking status: Former Smoker     Packs/day: 4.50     Years: 30.00     Pack years: 135.00     Types: Cigarettes     Quit date:      Years since quittin.5   • Smokeless tobacco: Never Used   • Tobacco comment: 1 TO 4 PPD X 30 YRS    Substance Use Topics   • Alcohol use: Yes     Comment: OCCASIONAL   • Drug use: No     Family History   Problem Relation Age of Onset   • Malig Hyperthermia Neg Hx      Health Maintenance Due   Topic Date Due   • COLORECTAL CANCER SCREENING  Never done   • TDAP/TD VACCINES (1 - Tdap) Never done   • ZOSTER VACCINE (1 of 2) Never done      Immunization History   Administered Date(s) Administered   • COVID-19 (MODERNA) 1st, 2nd, 3rd Dose Only 2021, 2021   • COVID-19 (MODERNA) BOOSTER 2022   • Fluzone High Dose =>65 Years (Vaxcare ONLY) 2017, 2017   • Fluzone High-Dose 65+yrs 2021   • Pneumococcal Conjugate 13-Valent (PCV13) 2016   • Pneumococcal Polysaccharide (PPSV23) 2017        Objective     Vitals:    22 1309   BP: 129/74   BP Location: Right arm   Patient Position: Sitting   Pulse: 66   Temp: 98.3 °F (36.8 °C)   TempSrc: Oral   Weight: 108 kg (239 lb)   Height: 190.5 cm (75\")     Body mass index is 29.87 kg/m².     Physical Exam  Vitals reviewed.   Constitutional:       General: He is not in acute distress.     Appearance: Normal appearance. He is well-developed.   HENT:      Head: Normocephalic and atraumatic.   Cardiovascular:      Rate and " Rhythm: Normal rate and regular rhythm.   Pulmonary:      Effort: Pulmonary effort is normal.      Breath sounds: Normal breath sounds.   Neurological:      Mental Status: He is alert and oriented to person, place, and time.   Psychiatric:         Mood and Affect: Mood and affect normal.           Result Review :     The following data was reviewed by: BRIAN Denny on 07/21/2022:          PSA Screen (08/25/2021 13:35)  Comprehensive Metabolic Panel (08/25/2021 13:35)  CBC (No Diff) (08/25/2021 13:35)  POCT urinalysis dipstick, automated (07/21/2022 13:34)                  Assessment and Plan {CC Problem List  Visit Diagnosis  ROS  Review (Popup)  Health Maintenance  Quality  BestPractice  Medications  SmartSets  SnapShot Encounters  Media :23}     Diagnoses and all orders for this visit:    1. Urological disorder (Primary)  -     Ambulatory Referral to Urology  -     POCT urinalysis dipstick, automated    2. Prostatitis, unspecified prostatitis type  -     ciprofloxacin (Cipro) 500 MG tablet; Take 1 tablet by mouth 2 (Two) Times a Day.  Dispense: 20 tablet; Refill: 0      UA normal in office today. Will treat with course of antibiotic to treat for prostate infection.  We will get him set up with urology again for anatomical changes status post surgery.        Follow Up     Return for Next scheduled follow up.

## 2022-08-08 ENCOUNTER — OFFICE VISIT (OUTPATIENT)
Dept: UROLOGY | Facility: CLINIC | Age: 72
End: 2022-08-08

## 2022-08-08 VITALS
HEIGHT: 75 IN | DIASTOLIC BLOOD PRESSURE: 66 MMHG | WEIGHT: 244 LBS | BODY MASS INDEX: 30.34 KG/M2 | SYSTOLIC BLOOD PRESSURE: 133 MMHG | HEART RATE: 70 BPM | TEMPERATURE: 98.7 F

## 2022-08-08 DIAGNOSIS — N40.1 BPH WITH OBSTRUCTION/LOWER URINARY TRACT SYMPTOMS: Primary | ICD-10-CM

## 2022-08-08 DIAGNOSIS — N13.8 BPH WITH OBSTRUCTION/LOWER URINARY TRACT SYMPTOMS: Primary | ICD-10-CM

## 2022-08-08 DIAGNOSIS — N48.0 BALANITIS XEROTICA OBLITERANS: ICD-10-CM

## 2022-08-08 LAB
BILIRUB BLD-MCNC: NEGATIVE MG/DL
CLARITY, POC: CLEAR
COLOR UR: YELLOW
EXPIRATION DATE: NORMAL
GLUCOSE UR STRIP-MCNC: NEGATIVE MG/DL
KETONES UR QL: NEGATIVE
LEUKOCYTE EST, POC: NEGATIVE
Lab: NORMAL
NITRITE UR-MCNC: NEGATIVE MG/ML
PH UR: 5.5 [PH] (ref 5–8)
PROT UR STRIP-MCNC: NEGATIVE MG/DL
RBC # UR STRIP: NEGATIVE /UL
SP GR UR: 1.02 (ref 1–1.03)
UROBILINOGEN UR QL: NORMAL

## 2022-08-08 PROCEDURE — 99203 OFFICE O/P NEW LOW 30 MIN: CPT | Performed by: UROLOGY

## 2022-08-08 PROCEDURE — 81003 URINALYSIS AUTO W/O SCOPE: CPT | Performed by: UROLOGY

## 2022-08-08 RX ORDER — TAMSULOSIN HYDROCHLORIDE 0.4 MG/1
1 CAPSULE ORAL DAILY
Qty: 90 CAPSULE | Refills: 3 | Status: SHIPPED | OUTPATIENT
Start: 2022-08-08 | End: 2022-11-06

## 2022-08-08 NOTE — PROGRESS NOTES
"Chief Complaint  BPH with obstructive uropathy    Subjective  No acute distress        Ramirez Baltazar presents to [facility]  History of Present Illness    Patient has been having difficulty with urination with small stream 100% of the time.  He has nocturia 2 times rarely 3 times.  No dysuria or gross hematuria.  No history of prostate cancer in the family.    Objective No acute distress  Vital Signs:   /66   Pulse 70   Temp 98.7 °F (37.1 °C)   Ht 190.5 cm (75\")   Wt 111 kg (244 lb)   BMI 30.50 kg/m²     No Known Allergies   Past medical history:  has a past medical history of Anxiety, Arthritis, Hypertension, Left shoulder pain, Low back pain, and Multiple bruises.   Past surgical history:  has a past surgical history that includes Cataract extraction (Right); Tonsillectomy; Other surgical history; Total shoulder arthroplasty w/ distal clavicle excision (Left, 6/24/2019); and Colonoscopy (2014).  Personal history: family history is not on file.  Social history:  reports that he quit smoking about 26 years ago. His smoking use included cigarettes. He has a 135.00 pack-year smoking history. He has never used smokeless tobacco. He reports current alcohol use. He reports that he does not use drugs.    Review of Systems    Please see past medical and surgical history rest of the system is negative    Physical Exam  Constitutional:       General: He is not in acute distress.     Appearance: Normal appearance. He is obese. He is not ill-appearing or toxic-appearing.      Comments: Left upper arm is in the brace following left wrist surgery   HENT:      Head: Normocephalic and atraumatic.      Ears:      Comments: No hearing loss  Cardiovascular:      Rate and Rhythm: Normal rate and regular rhythm.      Pulses: Normal pulses.      Heart sounds: Normal heart sounds. No murmur heard.  Pulmonary:      Effort: Pulmonary effort is normal.      Breath sounds: Normal breath sounds. No " rhonchi or rales.   Abdominal:      Palpations: Abdomen is soft. There is no mass.      Tenderness: There is no abdominal tenderness. There is no right CVA tenderness or left CVA tenderness.      Hernia: A hernia is present.      Comments: Right inguinal hernia present   Genitourinary:     Rectum: Normal.      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  Meatus is normal.    Right and left scrotum is normal.    Right left testicle and epididymis is normal.    VIOLETA.  Prostate gland is just about 25 g benign.  Musculoskeletal:         General: Normal range of motion.      Cervical back: Normal range of motion and neck supple. No rigidity or tenderness.      Right lower leg: No edema.      Left lower leg: No edema.      Comments: Left arm in the brace   Lymphadenopathy:      Cervical: No cervical adenopathy.   Skin:     General: Skin is warm.      Coloration: Skin is not jaundiced.   Neurological:      General: No focal deficit present.      Mental Status: He is alert and oriented to person, place, and time.      Motor: No weakness.      Gait: Gait normal.   Psychiatric:         Mood and Affect: Mood normal.         Behavior: Behavior normal.         Thought Content: Thought content normal.         Judgment: Judgment normal.        Result Review :                 Assessment and Plan    Diagnoses and all orders for this visit:    1. BPH with obstruction/lower urinary tract symptoms (Primary)  -     POC Urinalysis Dipstick, Automated  -     tamsulosin (FLOMAX) 0.4 MG capsule 24 hr capsule; Take 1 capsule by mouth Daily for 90 days.  Dispense: 90 capsule; Refill: 3    2. Balanitis xerotica obliterans    Start the patient on tamsulosin 0.4 mg [facility] and will do cystoscopy in about 10 days time.     Brief Urine Lab Results  (Last result in the past 365 days)      Color   Clarity   Blood   Leuk Est   Nitrite   Protein   CREAT   Urine HCG        08/08/22 0903 Yellow   Clear   Negative   Negative   Negative   Negative                   Follow Up   No follow-ups on file.  Patient was given instructions and counseling regarding his condition or for health maintenance advice. Please see specific information pulled into the AVS if appropriate.     Sandy Alcaraz MD

## 2022-08-16 ENCOUNTER — PATIENT ROUNDING (BHMG ONLY) (OUTPATIENT)
Dept: UROLOGY | Facility: CLINIC | Age: 72
End: 2022-08-16

## 2022-08-16 NOTE — PROGRESS NOTES
August 16, 2022    Hello, may I speak with Ramirez Baltazar?    My name is [place]    I am  with [facility]  [address]  [address]  [place] [address] [phone].    Before we get started may I verify your date of birth? 1950    I am calling to officially welcome you to our practice and ask about your recent visit. Is this a good time to talk?  Left message     Tell me about your visit with us. What things went well?        We're always looking for ways to make our patients' experiences even better. Do you have recommendations on ways we may improve?      Overall were you satisfied with your first visit to our practice?      I appreciate you taking the time to speak with me today. Is there anything else I can do for you?    Thank you, and have a great day.

## 2022-08-17 ENCOUNTER — PROCEDURE VISIT (OUTPATIENT)
Dept: UROLOGY | Facility: CLINIC | Age: 72
End: 2022-08-17

## 2022-08-17 DIAGNOSIS — N40.1 BPH WITH OBSTRUCTION/LOWER URINARY TRACT SYMPTOMS: Primary | ICD-10-CM

## 2022-08-17 DIAGNOSIS — N13.8 BPH WITH OBSTRUCTION/LOWER URINARY TRACT SYMPTOMS: Primary | ICD-10-CM

## 2022-08-17 PROCEDURE — 52000 CYSTOURETHROSCOPY: CPT | Performed by: UROLOGY

## 2022-08-17 PROCEDURE — 51798 US URINE CAPACITY MEASURE: CPT | Performed by: UROLOGY

## 2022-08-17 PROCEDURE — 51741 ELECTRO-UROFLOWMETRY FIRST: CPT | Performed by: UROLOGY

## 2022-08-17 NOTE — PROGRESS NOTES
Cystoscopy    Date/Time: 8/17/2022 9:47 AM  Performed by: Sandy Alcaraz MD  Authorized by: Sandy Alcaraz MD   Preparation: Patient was prepped and draped in the usual sterile fashion.  Local anesthesia used: yes    Anesthesia:  Local anesthesia used: yes  Local Anesthetic: topical anesthetic  Anesthetic total: 12 mL    Sedation:  Patient sedated: no        Indication.  BPH with obstructive uropathy    Patient was placed in lithotomy position.  Thorough scrubbing of lower abdomen and external genitalia was performed with Hibiclens.  18 Olympus flexible cystoscope was inserted into the urethra.  Patient has xerotica balanitis obliterans and is going to use over-the-counter cortisone once or twice a day.  Patient has scarring in the urethra but not complete obstruction.  Prostate gland is large and obstructive.  Patient has large lateral lobes but no median lobe enlargement.  Both ureteral orifices are normal.  Bladder is trabeculated and there is a little wide necked diverticulum at the dome of the urinary bladder.  Patient does not have any bladder tumor present.     There was no evidence of vesicocolic fistula.  Flexible cystoscope was removed and patient tolerated the procedure very well.    Uroflow.    Q-Miguel Angel.  Artifacts and could not determine the actual Q-Miguel Angel.    Average flow.  Because of artifacts I cannot determine the average flow    Voided volume.  398 mL    Bladder scan for residual.  3.5 MHz transducer was applied suprapubic area and volume of the urinary bladder was calculated which was 0.    Clinically patient is having a lot of difficulty with urination and has to strain to urinate.  I would recommend UroLift because his bladder neck is wide open but has enlarged lateral lobes.  We will schedule him for  first week of September for cystoscopy and UroLift.  I have given him Percocet 10/325, Valium 5 mg and Keflex 500 mg 1 hour before the operation.  Will do urine culture to make sure  there is no infection before the  procedure.

## 2022-08-26 ENCOUNTER — OFFICE VISIT (OUTPATIENT)
Dept: FAMILY MEDICINE CLINIC | Age: 72
End: 2022-08-26

## 2022-08-26 ENCOUNTER — LAB (OUTPATIENT)
Dept: LAB | Facility: HOSPITAL | Age: 72
End: 2022-08-26

## 2022-08-26 VITALS
HEIGHT: 75 IN | WEIGHT: 234 LBS | HEART RATE: 88 BPM | OXYGEN SATURATION: 97 % | BODY MASS INDEX: 29.09 KG/M2 | SYSTOLIC BLOOD PRESSURE: 127 MMHG | DIASTOLIC BLOOD PRESSURE: 71 MMHG

## 2022-08-26 DIAGNOSIS — Z98.890 HISTORY OF ABDOMINAL AORTIC ANEURYSM REPAIR: ICD-10-CM

## 2022-08-26 DIAGNOSIS — Z12.5 SCREENING FOR PROSTATE CANCER: ICD-10-CM

## 2022-08-26 DIAGNOSIS — E78.00 ELEVATED CHOLESTEROL: ICD-10-CM

## 2022-08-26 DIAGNOSIS — M25.532 CHRONIC PAIN OF LEFT WRIST: ICD-10-CM

## 2022-08-26 DIAGNOSIS — F34.1 DYSTHYMIC DISORDER: ICD-10-CM

## 2022-08-26 DIAGNOSIS — G89.29 CHRONIC PAIN OF LEFT WRIST: ICD-10-CM

## 2022-08-26 DIAGNOSIS — I10 ESSENTIAL HYPERTENSION: Primary | ICD-10-CM

## 2022-08-26 PROBLEM — J01.00 ACUTE NON-RECURRENT MAXILLARY SINUSITIS: Status: RESOLVED | Noted: 2022-02-25 | Resolved: 2022-08-26

## 2022-08-26 PROBLEM — Z95.828 HISTORY OF ENDOVASCULAR STENT GRAFT FOR ABDOMINAL AORTIC ANEURYSM (AAA): Status: ACTIVE | Noted: 2022-08-26

## 2022-08-26 PROBLEM — Z00.00 MEDICARE ANNUAL WELLNESS VISIT, SUBSEQUENT: Status: RESOLVED | Noted: 2022-02-25 | Resolved: 2022-08-26

## 2022-08-26 LAB
ALBUMIN SERPL-MCNC: 4.3 G/DL (ref 3.5–5.2)
ALBUMIN/GLOB SERPL: 1.4 G/DL
ALP SERPL-CCNC: 59 U/L (ref 39–117)
ALT SERPL W P-5'-P-CCNC: 14 U/L (ref 1–41)
ANION GAP SERPL CALCULATED.3IONS-SCNC: 10 MMOL/L (ref 5–15)
AST SERPL-CCNC: 16 U/L (ref 1–40)
BILIRUB SERPL-MCNC: 0.4 MG/DL (ref 0–1.2)
BUN SERPL-MCNC: 15 MG/DL (ref 8–23)
BUN/CREAT SERPL: 16.3 (ref 7–25)
CALCIUM SPEC-SCNC: 9.5 MG/DL (ref 8.6–10.5)
CHLORIDE SERPL-SCNC: 98 MMOL/L (ref 98–107)
CHOLEST SERPL-MCNC: 190 MG/DL (ref 0–200)
CO2 SERPL-SCNC: 28 MMOL/L (ref 22–29)
CREAT SERPL-MCNC: 0.92 MG/DL (ref 0.76–1.27)
DEPRECATED RDW RBC AUTO: 43.9 FL (ref 37–54)
EGFRCR SERPLBLD CKD-EPI 2021: 88.9 ML/MIN/1.73
ERYTHROCYTE [DISTWIDTH] IN BLOOD BY AUTOMATED COUNT: 12.7 % (ref 12.3–15.4)
GLOBULIN UR ELPH-MCNC: 3 GM/DL
GLUCOSE SERPL-MCNC: 104 MG/DL (ref 65–99)
HCT VFR BLD AUTO: 40.9 % (ref 37.5–51)
HDLC SERPL-MCNC: 33 MG/DL (ref 40–60)
HGB BLD-MCNC: 12.8 G/DL (ref 13–17.7)
LDLC SERPL CALC-MCNC: 114 MG/DL (ref 0–100)
LDLC/HDLC SERPL: 3.26 {RATIO}
MCH RBC QN AUTO: 28.8 PG (ref 26.6–33)
MCHC RBC AUTO-ENTMCNC: 31.3 G/DL (ref 31.5–35.7)
MCV RBC AUTO: 92.1 FL (ref 79–97)
PLATELET # BLD AUTO: 208 10*3/MM3 (ref 140–450)
PMV BLD AUTO: 9.3 FL (ref 6–12)
POTASSIUM SERPL-SCNC: 4.9 MMOL/L (ref 3.5–5.2)
PROT SERPL-MCNC: 7.3 G/DL (ref 6–8.5)
PSA SERPL-MCNC: 7.39 NG/ML (ref 0–4)
RBC # BLD AUTO: 4.44 10*6/MM3 (ref 4.14–5.8)
SODIUM SERPL-SCNC: 136 MMOL/L (ref 136–145)
TRIGL SERPL-MCNC: 247 MG/DL (ref 0–150)
TSH SERPL DL<=0.05 MIU/L-ACNC: 1.43 UIU/ML (ref 0.27–4.2)
VLDLC SERPL-MCNC: 43 MG/DL (ref 5–40)
WBC NRBC COR # BLD: 7.41 10*3/MM3 (ref 3.4–10.8)

## 2022-08-26 PROCEDURE — G0103 PSA SCREENING: HCPCS | Performed by: FAMILY MEDICINE

## 2022-08-26 PROCEDURE — 80053 COMPREHEN METABOLIC PANEL: CPT | Performed by: FAMILY MEDICINE

## 2022-08-26 PROCEDURE — 85027 COMPLETE CBC AUTOMATED: CPT | Performed by: FAMILY MEDICINE

## 2022-08-26 PROCEDURE — 99214 OFFICE O/P EST MOD 30 MIN: CPT | Performed by: FAMILY MEDICINE

## 2022-08-26 PROCEDURE — 80061 LIPID PANEL: CPT | Performed by: FAMILY MEDICINE

## 2022-08-26 PROCEDURE — 36415 COLL VENOUS BLD VENIPUNCTURE: CPT | Performed by: FAMILY MEDICINE

## 2022-08-26 PROCEDURE — 84443 ASSAY THYROID STIM HORMONE: CPT | Performed by: FAMILY MEDICINE

## 2022-08-26 RX ORDER — LISINOPRIL 20 MG/1
20 TABLET ORAL DAILY
Qty: 90 TABLET | Refills: 3 | Status: SHIPPED | OUTPATIENT
Start: 2022-08-26 | End: 2023-03-13 | Stop reason: SDUPTHER

## 2022-08-26 NOTE — ASSESSMENT & PLAN NOTE
Lipid abnormalities are improving with lifestyle modifications.  Nutritional counseling was provided.  Lipids will be reassessed in 6 months.  NOT AT GOAL, WILL RECHECK LABS AND PROCEED ACCORDINGLY

## 2022-08-26 NOTE — PROGRESS NOTES
Chief Complaint  Hyperlipidemia and Hypertension (6mo follow up)    Subjective          Ramirez Baltazar presents to [facility]  --TOLERATING BLOOD PRESSURE MEDICATION WITHOUT APPARENT SIDE EFFECTS  --LAST CHOL WAS > 200, ON DIETARY EFFORTS ONLY  --THE DEPRESSION IS DOING WELL WITH THE SSRI  --HAD AAA REPAIR LAST YEAR.  FEELING WELL BUT IS OVERDUE FOR F/U WITH THE VASCULAR SURGEON   --DUE FOR ROUTINE LABS  --FRACTURED HIS LEFT WRIST A YEAR AGO, CONTINUED HAVING PAIN AND NOW HAND SURGERY HAS DONE A PARTIAL FUSION F FEW DAYS AGO.          No Known Allergies     Health Maintenance Due   Topic Date Due   • COLORECTAL CANCER SCREENING  Never done   • TDAP/TD VACCINES (1 - Tdap) Never done   • ZOSTER VACCINE (1 of 2) Never done   • LIPID PANEL  08/25/2022        Current Outpatient Medications on File Prior to Visit   Medication Sig   • cephalexin (Keflex) 500 MG capsule Take 1 capsule by mouth 3 (Three) Times a Day.   • ciprofloxacin (Cipro) 500 MG tablet Take 1 tablet by mouth 2 (Two) Times a Day.   • gabapentin (NEURONTIN) 600 MG tablet Take 600 mg by mouth 3 (Three) Times a Day.   • HYDROcodone-acetaminophen (NORCO)  MG per tablet Take 1 tablet by mouth Every 6 (Six) Hours As Needed for Moderate Pain . 7.5mg   • Multiple Vitamins-Minerals (MULTIVITAMIN ADULT PO) Take 1 tablet by mouth Daily.   • Omega-3 1000 MG capsule Take  by mouth Daily.   • Potassium 75 MG tablet Take 1 tablet by mouth Daily.   • tamsulosin (FLOMAX) 0.4 MG capsule 24 hr capsule Take 1 capsule by mouth Daily for 90 days.   • Vitamin D, Cholecalciferol, (CHOLECALCIFEROL) 10 MCG (400 UNIT) tablet Take 400 Units by mouth Daily.   • vitamin E 100 UNIT capsule Take 100 Units by mouth Daily.   • [DISCONTINUED] lisinopril (PRINIVIL,ZESTRIL) 20 MG tablet Take 1 tablet by mouth Daily.   • [DISCONTINUED] sertraline (ZOLOFT) 50 MG tablet Take 1 tablet by mouth once daily     No current facility-administered medications  "on file prior to visit.       Immunization History   Administered Date(s) Administered   • COVID-19 (MODERNA) 1st, 2nd, 3rd Dose Only 06/25/2021, 07/22/2021   • COVID-19 (MODERNA) BOOSTER 05/09/2022   • Fluzone High Dose =>65 Years (Vaxcare ONLY) 11/29/2017, 11/29/2017   • Fluzone High-Dose 65+yrs 11/09/2021   • Pneumococcal Conjugate 13-Valent (PCV13) 05/26/2016   • Pneumococcal Polysaccharide (PPSV23) 06/12/2017       Review of Systems   Constitutional: Negative for activity change, appetite change, chills, fatigue and fever.   HENT: Negative for congestion, ear pain, rhinorrhea and sore throat.    Respiratory: Negative for cough and shortness of breath.    Cardiovascular: Negative for chest pain, palpitations and leg swelling.   Gastrointestinal: Negative for abdominal pain, constipation, diarrhea, nausea and vomiting.   Musculoskeletal: Negative for arthralgias and myalgias.   Neurological: Negative for headache.        Objective     /71   Pulse 88   Ht 190.5 cm (75\")   Wt 106 kg (234 lb)   SpO2 97%   BMI 29.25 kg/m²       Physical Exam  Vitals and nursing note reviewed.   Constitutional:       General: He is not in acute distress.     Appearance: Normal appearance.   Cardiovascular:      Rate and Rhythm: Normal rate and regular rhythm.      Heart sounds: Normal heart sounds. No murmur heard.  Pulmonary:      Effort: Pulmonary effort is normal.      Breath sounds: Normal breath sounds.   Abdominal:      Palpations: Abdomen is soft.      Tenderness: There is no abdominal tenderness.   Musculoskeletal:      Cervical back: Neck supple.      Right lower leg: No edema.      Left lower leg: No edema.      Comments: CAST IS PRESENT ON LEFT FOREARM.  GOOD SENSATION AND ROM OF DISTAL FINGERS     Lymphadenopathy:      Cervical: No cervical adenopathy.   Neurological:      General: No focal deficit present.      Mental Status: He is alert.      Cranial Nerves: No cranial nerve deficit.      Coordination: " Coordination normal.      Gait: Gait normal.   Psychiatric:         Mood and Affect: Mood normal.         Behavior: Behavior normal.         Result Review :                             Assessment and Plan      Diagnoses and all orders for this visit:    1. Essential hypertension (Primary)  Assessment & Plan:  Hypertension is improving with treatment.  Continue current treatment regimen.  Continue current medications.  Blood pressure will be reassessed at the next regular appointment.    Orders:  -     CBC (No Diff)  -     Comprehensive Metabolic Panel  -     TSH  -     lisinopril (PRINIVIL,ZESTRIL) 20 MG tablet; Take 1 tablet by mouth Daily.  Dispense: 90 tablet; Refill: 3    2. Dysthymic disorder  Assessment & Plan:  Patient's depression is single episode and is moderate without psychosis. Their depression is currently in partial remission and the condition is improving with treatment. This will be reassessed at the next regular appointment. F/U as described:patient will continue current medication therapy.    Orders:  -     sertraline (ZOLOFT) 50 MG tablet; Take 1 tablet by mouth Daily.  Dispense: 90 tablet; Refill: 3    3. Elevated cholesterol  Assessment & Plan:  Lipid abnormalities are improving with lifestyle modifications.  Nutritional counseling was provided.  Lipids will be reassessed in 6 months.  NOT AT GOAL, WILL RECHECK LABS AND PROCEED ACCORDINGLY     Orders:  -     Lipid Panel    4. Screening for prostate cancer  -     PSA Screen    5. History of abdominal aortic aneurysm repair  Assessment & Plan:  WE WILL GET HIM BACK IN WITH THE VASCULAR SURGEON AND SEE IF THEY WANT A CT PRIOR TO HIS VISIT       6. Chronic pain of left wrist  Comments:  PLANS PER HAND SURGERY, NOTES REVIEWED           Follow Up     Return in about 6 months (around 2/26/2023).    Patient was given instructions and counseling regarding his condition or for health maintenance advice. Please see specific information pulled into the AVS  if appropriate.

## 2022-08-29 ENCOUNTER — TELEPHONE (OUTPATIENT)
Dept: FAMILY MEDICINE CLINIC | Age: 72
End: 2022-08-29

## 2022-08-29 NOTE — TELEPHONE ENCOUNTER
----- Message from Natanael Cunha MD sent at 8/26/2022  5:02 PM EDT -----  THIS PATIENT HAD AORTIC ANEURYSM REPAIR BY DR WARREN IN [facility] AND IS DUE FOR A F/U VISIT BUT BELIEVES HE NEEDS A CT BEFORE THE VISIT.  PLEASE CALL THAT OFFICE TO GET HIM A F/U APPT AND SEE IF THEY WANT TO ORDER A CT BEFORE HIS VISIT.  THANKS

## 2022-08-29 NOTE — TELEPHONE ENCOUNTER
Dr Bolton's office said he was supposed to f/u in July of 2021.  He will need a CTA of abd and pelvis.   She said Dr Cunha can order it or they can order it prior to his appt.

## 2022-08-30 NOTE — TELEPHONE ENCOUNTER
Tanya at Dr Bolton office informed. She said they will call the pt and set up an appt and the testing.

## 2022-08-31 DIAGNOSIS — I71.40 ABDOMINAL AORTIC ANEURYSM (AAA) WITHOUT RUPTURE: Primary | ICD-10-CM

## 2022-09-01 ENCOUNTER — PROCEDURE VISIT (OUTPATIENT)
Dept: UROLOGY | Facility: CLINIC | Age: 72
End: 2022-09-01

## 2022-09-01 VITALS
TEMPERATURE: 98.7 F | SYSTOLIC BLOOD PRESSURE: 138 MMHG | DIASTOLIC BLOOD PRESSURE: 68 MMHG | HEIGHT: 75 IN | WEIGHT: 234 LBS | HEART RATE: 71 BPM | BODY MASS INDEX: 29.09 KG/M2

## 2022-09-01 DIAGNOSIS — N13.8 BPH WITH OBSTRUCTION/LOWER URINARY TRACT SYMPTOMS: ICD-10-CM

## 2022-09-01 DIAGNOSIS — R97.20 ELEVATED PROSTATE SPECIFIC ANTIGEN (PSA): Primary | ICD-10-CM

## 2022-09-01 DIAGNOSIS — N40.1 BPH WITH OBSTRUCTION/LOWER URINARY TRACT SYMPTOMS: ICD-10-CM

## 2022-09-01 PROCEDURE — 87081 CULTURE SCREEN ONLY: CPT | Performed by: UROLOGY

## 2022-09-01 PROCEDURE — 99213 OFFICE O/P EST LOW 20 MIN: CPT | Performed by: UROLOGY

## 2022-09-01 RX ORDER — GABAPENTIN 300 MG/1
300 CAPSULE ORAL
COMMUNITY
Start: 2022-08-01 | End: 2022-09-01

## 2022-09-01 RX ORDER — OMEPRAZOLE 20 MG/1
20 CAPSULE, DELAYED RELEASE ORAL DAILY
COMMUNITY
Start: 2022-08-01

## 2022-09-01 NOTE — PROGRESS NOTES
"Chief Complaint  Elevated PSA (Here for rectal swab and to schedule prostate biopsy)    Subjective Patient is doing very well        Ramirez Baltazar presents to [facility]  History of Present Illness    Patient has elevated PSA on recent blood draw.  No history of prostate cancer in the family.  Patient was served in [place] but as far as his knowledge he was not exposed to agent orange.  Patient had gross hematuria after cystoscopy for a week    Objective No acute distress  Vital Signs:   /68   Pulse 71   Temp 98.7 °F (37.1 °C)   Ht 190.5 cm (75\")   Wt 106 kg (234 lb)   BMI 29.25 kg/m²     No Known Allergies   Past medical history:  has a past medical history of Anxiety, Arthritis, Hypertension, Left shoulder pain, Low back pain, and Multiple bruises.   Past surgical history:  has a past surgical history that includes Cataract extraction (Right); Tonsillectomy; Other surgical history; Total shoulder arthroplasty w/ distal clavicle excision (Left, 06/24/2019); Colonoscopy (2014); and Hand surgery (08/05/2022).  Personal history: family history is not on file.  Social history:  reports that he quit smoking about 26 years ago. His smoking use included cigarettes. He has a 135.00 pack-year smoking history. He has never used smokeless tobacco. He reports current alcohol use. He reports that he does not use drugs.    Review of Systems    Please review past medical and surgical history and rest of the system is negative    Physical Exam  Constitutional:       General: He is not in acute distress.     Appearance: Normal appearance. He is normal weight. He is not ill-appearing or toxic-appearing.   HENT:      Head: Normocephalic and atraumatic.   Genitourinary:     Prostate: Normal.      Comments: Prostate gland is just about 15 g and benign  Neurological:      General: No focal deficit present.      Mental Status: He is alert and oriented to person, place, and time.      Motor: No " weakness.      Gait: Gait normal.   Psychiatric:         Mood and Affect: Mood normal.         Behavior: Behavior normal.         Thought Content: Thought content normal.         Judgment: Judgment normal.        Result Review :                 Assessment and Plan    Diagnoses and all orders for this visit:    1. Elevated prostate specific antigen (PSA) (Primary)  -     Fluoroquinolone-resistant Gram-negative Barry Detection Culture - Swab, Large Intestine, Rectum    2. BPH with obstruction/lower urinary tract symptoms      Anal swab taken for Cipro resistant bacteria.  Will do ultrasound of prostate biopsy next week  Brief Urine Lab Results  (Last result in the past 365 days)      Color   Clarity   Blood   Leuk Est   Nitrite   Protein   CREAT   Urine HCG        08/08/22 0903 Yellow   Clear   Negative   Negative   Negative   Negative                  Follow Up   No follow-ups on file.  Patient was given instructions and counseling regarding his condition or for health maintenance advice. Please see specific information pulled into the AVS if appropriate.     Sandy Alcaraz MD

## 2022-09-06 LAB
BACTERIA ANAL CULT: NORMAL
BACTERIA ANAL CULT: NORMAL

## 2022-09-08 ENCOUNTER — PROCEDURE VISIT (OUTPATIENT)
Dept: UROLOGY | Facility: CLINIC | Age: 72
End: 2022-09-08

## 2022-09-08 ENCOUNTER — HOSPITAL ENCOUNTER (OUTPATIENT)
Dept: CT IMAGING | Facility: HOSPITAL | Age: 72
Discharge: HOME | End: 2022-09-08
Admitting: SURGERY

## 2022-09-08 DIAGNOSIS — R97.20 ELEVATED PROSTATE SPECIFIC ANTIGEN (PSA): Primary | ICD-10-CM

## 2022-09-08 DIAGNOSIS — I71.40 ABDOMINAL AORTIC ANEURYSM (AAA) WITHOUT RUPTURE: ICD-10-CM

## 2022-09-08 LAB
CREAT BLDA-MCNC: 1 MG/DL
EGFRCR SERPLBLD CKD-EPI 2021: 80.5 ML/MIN/1.73

## 2022-09-08 PROCEDURE — 0 IOPAMIDOL PER 1 ML: Performed by: SURGERY

## 2022-09-08 PROCEDURE — 74174 CTA ABD&PLVS W/CONTRAST: CPT

## 2022-09-08 PROCEDURE — 55700 PR PROSTATE NEEDLE BIOPSY ANY APPROACH: CPT | Performed by: UROLOGY

## 2022-09-08 PROCEDURE — 76942 ECHO GUIDE FOR BIOPSY: CPT | Performed by: UROLOGY

## 2022-09-08 PROCEDURE — 82565 ASSAY OF CREATININE: CPT

## 2022-09-08 PROCEDURE — 88305 TISSUE EXAM BY PATHOLOGIST: CPT | Performed by: UROLOGY

## 2022-09-08 RX ORDER — CIPROFLOXACIN 500 MG/1
500 TABLET, FILM COATED ORAL 2 TIMES DAILY
Qty: 6 TABLET | Refills: 0 | Status: SHIPPED | OUTPATIENT
Start: 2022-09-08 | End: 2022-09-11

## 2022-09-08 RX ADMIN — IOPAMIDOL 100 ML: 755 INJECTION, SOLUTION INTRAVENOUS at 15:14

## 2022-09-08 NOTE — PROGRESS NOTES
Ultrasound of prostate and biopsy.    Patient has elevated PSA.  PSA 7.390.    Patient was placed in left lateral position ultrasound was done in axial and sagittal planes.  Height of the prostate is 27.7 mm and width is 48.3 mm.  Length is 34.5 mm and volume is 24.2 cm³.    Doppler of the prostate was done and I do not see any abnormal vascularity.  I do not see any hypoechoic lesions.  Patient does have some prostatic calculi.  Seminal vesicle is normal and apex is normal.    I took photographs of different parts of the prostate and seminal vesicles with apex.    We went in with injection of 1% Xylocaine and half with epinephrine between prostate and seminal vesicles.    We went ahead with taking biopsies of the prostate taking 3 biopsies each of left base, left mid, and left apex.  We took 3 biopsies each on the right base.  Right mid and right apex.  1 biopsies each of right and left transitional zone.    I did rectal examination on the patient and there was not much bleeding.  I did do rectal examination on him before the procedure and I do not feel any nodules in the prostate.    Patient tolerated the procedure very well.    Will see him next week with biopsy results

## 2022-09-12 LAB
CYTO UR: NORMAL
LAB AP CASE REPORT: NORMAL
LAB AP CLINICAL INFORMATION: NORMAL
PATH REPORT.FINAL DX SPEC: NORMAL
PATH REPORT.GROSS SPEC: NORMAL

## 2022-09-15 ENCOUNTER — OFFICE VISIT (OUTPATIENT)
Dept: UROLOGY | Facility: CLINIC | Age: 72
End: 2022-09-15

## 2022-09-15 VITALS
SYSTOLIC BLOOD PRESSURE: 129 MMHG | DIASTOLIC BLOOD PRESSURE: 60 MMHG | BODY MASS INDEX: 29.09 KG/M2 | TEMPERATURE: 97.5 F | HEART RATE: 80 BPM | HEIGHT: 75 IN | WEIGHT: 234 LBS

## 2022-09-15 DIAGNOSIS — N40.1 BPH WITH OBSTRUCTION/LOWER URINARY TRACT SYMPTOMS: Primary | ICD-10-CM

## 2022-09-15 DIAGNOSIS — C61 PROSTATE CANCER: ICD-10-CM

## 2022-09-15 DIAGNOSIS — N13.8 BPH WITH OBSTRUCTION/LOWER URINARY TRACT SYMPTOMS: Primary | ICD-10-CM

## 2022-09-15 LAB
BILIRUB BLD-MCNC: NEGATIVE MG/DL
CLARITY, POC: CLEAR
COLOR UR: YELLOW
EXPIRATION DATE: ABNORMAL
GLUCOSE UR STRIP-MCNC: NEGATIVE MG/DL
KETONES UR QL: NEGATIVE
LEUKOCYTE EST, POC: NEGATIVE
Lab: ABNORMAL
NITRITE UR-MCNC: NEGATIVE MG/ML
PH UR: 6.5 [PH] (ref 5–8)
PROT UR STRIP-MCNC: NEGATIVE MG/DL
RBC # UR STRIP: ABNORMAL /UL
SP GR UR: 1.02 (ref 1–1.03)
UROBILINOGEN UR QL: NORMAL

## 2022-09-15 PROCEDURE — 99212 OFFICE O/P EST SF 10 MIN: CPT | Performed by: UROLOGY

## 2022-09-15 PROCEDURE — 81003 URINALYSIS AUTO W/O SCOPE: CPT | Performed by: UROLOGY
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
## 2022-09-20 ENCOUNTER — OFFICE VISIT (OUTPATIENT)
Dept: UROLOGY | Facility: CLINIC | Age: 72
End: 2022-09-20

## 2022-09-20 VITALS — BODY MASS INDEX: 29.27 KG/M2 | HEIGHT: 76 IN | WEIGHT: 240.4 LBS | RESPIRATION RATE: 16 BRPM

## 2022-09-20 DIAGNOSIS — C61 PROSTATE CANCER: Primary | ICD-10-CM

## 2022-09-20 PROCEDURE — 99214 OFFICE O/P EST MOD 30 MIN: CPT | Performed by: UROLOGY

## 2022-09-20 RX ORDER — MELOXICAM 7.5 MG/1
TABLET ORAL EVERY 24 HOURS
COMMUNITY
End: 2022-11-29

## 2022-09-21 ENCOUNTER — OFFICE VISIT (OUTPATIENT)
Dept: VASCULAR SURGERY | Facility: HOSPITAL | Age: 72
End: 2022-09-21

## 2022-09-21 VITALS
HEART RATE: 68 BPM | OXYGEN SATURATION: 97 % | TEMPERATURE: 97.8 F | SYSTOLIC BLOOD PRESSURE: 132 MMHG | DIASTOLIC BLOOD PRESSURE: 60 MMHG | RESPIRATION RATE: 18 BRPM

## 2022-09-21 DIAGNOSIS — I71.40 ABDOMINAL AORTIC ANEURYSM (AAA) WITHOUT RUPTURE: Primary | ICD-10-CM

## 2022-09-21 PROCEDURE — 99212 OFFICE O/P EST SF 10 MIN: CPT | Performed by: SURGERY

## 2022-09-21 PROCEDURE — G0463 HOSPITAL OUTPT CLINIC VISIT: HCPCS | Performed by: SURGERY

## 2022-09-21 NOTE — PROGRESS NOTES
[facility]   Follow up Office    Patient Name: Ramirez Baltazar  : 1950  MRN: [id number]  Primary Care Physician:  Natanael Cunha MD      Subjective   Subjective     HPI:    Ramirez Baltazar is a 71 y.o. male here for follow-up after endovascular repair of an abdominal aorta aneurysm in 2021.  Doing well.  No abdominal or back pain.  He has no symptoms to suggest intermittent claudication although he has some back pain due to sciatica.  He is followed by a pain management service.      Objective     Vitals:   Temp:  [97.8 °F (36.6 °C)] 97.8 °F (36.6 °C)  Heart Rate:  [68] 68  Resp:  [16-18] 18  BP: (132)/(60) 132/60    Physical Exam      General: Alert, no acute distress.  Extremities: Symmetric.    Diagnostic studies: A CT scan of the abdomen and pelvis with IV contrast dated 2022 demonstrates satisfactory exclusion of the aneurysm with a slight decrease in the dimensions of the aneurysm from 5.1 cm down to 4.9 cm.  No evidence of endoleak.    Assessment & Plan   Assessment / Plan     Diagnoses and all orders for this visit:    1. Abdominal aortic aneurysm (AAA) without rupture (Primary)       Assessment/Plan:   Satisfactory progress following endovascular repair of abdominal aorta aneurysm.  Follow-up with a repeat CAT scan in 1 year.        Electronically signed by Aidan Bustamante MD, 22, 9:29 AM EDT.

## 2022-09-28 ENCOUNTER — HOSPITAL ENCOUNTER (OUTPATIENT)
Dept: NUCLEAR MEDICINE | Facility: HOSPITAL | Age: 72
Discharge: HOME | End: 2022-09-28

## 2022-09-28 DIAGNOSIS — C61 PROSTATE CANCER: ICD-10-CM

## 2022-09-28 PROCEDURE — A9503 TC99M MEDRONATE: HCPCS | Performed by: UROLOGY

## 2022-09-28 PROCEDURE — 0 TECHNETIUM MEDRONATE KIT: Performed by: UROLOGY

## 2022-09-28 PROCEDURE — 78306 BONE IMAGING WHOLE BODY: CPT

## 2022-09-28 RX ORDER — TC 99M MEDRONATE 20 MG/10ML
20.8 INJECTION, POWDER, LYOPHILIZED, FOR SOLUTION INTRAVENOUS
Status: COMPLETED | OUTPATIENT
Start: 2022-09-28 | End: 2022-09-28

## 2022-09-28 RX ADMIN — TC 99M MEDRONATE 20.8 MILLICURIE: 20 INJECTION, POWDER, LYOPHILIZED, FOR SOLUTION INTRAVENOUS at 13:13

## 2022-09-29 ENCOUNTER — TELEPHONE (OUTPATIENT)
Dept: UROLOGY | Facility: CLINIC | Age: 72
End: 2022-09-29

## 2022-10-12 ENCOUNTER — OFFICE VISIT (OUTPATIENT)
Dept: UROLOGY | Facility: CLINIC | Age: 72
End: 2022-10-12

## 2022-10-12 ENCOUNTER — PREP FOR SURGERY (OUTPATIENT)
Dept: OTHER | Facility: HOSPITAL | Age: 72
End: 2022-10-12

## 2022-10-12 VITALS — HEIGHT: 75 IN | BODY MASS INDEX: 29.72 KG/M2 | WEIGHT: 239 LBS | RESPIRATION RATE: 12 BRPM

## 2022-10-12 DIAGNOSIS — C61 PROSTATE CANCER: Primary | ICD-10-CM

## 2022-10-12 DIAGNOSIS — N40.1 BPH WITH OBSTRUCTION/LOWER URINARY TRACT SYMPTOMS: ICD-10-CM

## 2022-10-12 DIAGNOSIS — N13.8 BPH WITH OBSTRUCTION/LOWER URINARY TRACT SYMPTOMS: ICD-10-CM

## 2022-10-12 PROCEDURE — 99214 OFFICE O/P EST MOD 30 MIN: CPT | Performed by: UROLOGY

## 2022-10-12 RX ORDER — CEFAZOLIN SODIUM 2 G/100ML
2 INJECTION, SOLUTION INTRAVENOUS ONCE
Status: CANCELLED | OUTPATIENT
Start: 2022-10-12 | End: 2022-10-12
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
## 2022-11-29 ENCOUNTER — HOSPITAL ENCOUNTER (OUTPATIENT)
Dept: GENERAL RADIOLOGY | Facility: HOSPITAL | Age: 72
Discharge: HOME | End: 2022-11-29

## 2022-11-29 ENCOUNTER — PRE-ADMISSION TESTING (OUTPATIENT)
Dept: PREADMISSION TESTING | Facility: HOSPITAL | Age: 72
End: 2022-11-29

## 2022-11-29 VITALS
DIASTOLIC BLOOD PRESSURE: 60 MMHG | OXYGEN SATURATION: 96 % | HEIGHT: 75 IN | HEART RATE: 74 BPM | RESPIRATION RATE: 16 BRPM | BODY MASS INDEX: 29.39 KG/M2 | SYSTOLIC BLOOD PRESSURE: 122 MMHG | WEIGHT: 236.33 LBS

## 2022-11-29 DIAGNOSIS — C61 PROSTATE CANCER: ICD-10-CM

## 2022-11-29 DIAGNOSIS — Z01.818 PRE-OP TESTING: Primary | ICD-10-CM

## 2022-11-29 LAB
ABO GROUP BLD: NORMAL
ALBUMIN SERPL-MCNC: 4.4 G/DL (ref 3.5–5.2)
ALBUMIN/GLOB SERPL: 1.5 G/DL
ALP SERPL-CCNC: 65 U/L (ref 39–117)
ALT SERPL W P-5'-P-CCNC: 17 U/L (ref 1–41)
ANION GAP SERPL CALCULATED.3IONS-SCNC: 11 MMOL/L (ref 5–15)
AST SERPL-CCNC: 17 U/L (ref 1–40)
BILIRUB SERPL-MCNC: 0.3 MG/DL (ref 0–1.2)
BILIRUB UR QL STRIP: NEGATIVE
BUN SERPL-MCNC: 14 MG/DL (ref 8–23)
BUN/CREAT SERPL: 16.3 (ref 7–25)
CALCIUM SPEC-SCNC: 8.6 MG/DL (ref 8.6–10.5)
CHLORIDE SERPL-SCNC: 98 MMOL/L (ref 98–107)
CLARITY UR: CLEAR
CO2 SERPL-SCNC: 24 MMOL/L (ref 22–29)
COLOR UR: YELLOW
CREAT SERPL-MCNC: 0.86 MG/DL (ref 0.76–1.27)
DEPRECATED RDW RBC AUTO: 44.8 FL (ref 37–54)
EGFRCR SERPLBLD CKD-EPI 2021: 92 ML/MIN/1.73
ERYTHROCYTE [DISTWIDTH] IN BLOOD BY AUTOMATED COUNT: 13.8 % (ref 12.3–15.4)
GLOBULIN UR ELPH-MCNC: 3 GM/DL
GLUCOSE SERPL-MCNC: 109 MG/DL (ref 65–99)
GLUCOSE UR STRIP-MCNC: NEGATIVE MG/DL
HCT VFR BLD AUTO: 43.1 % (ref 37.5–51)
HGB BLD-MCNC: 14.5 G/DL (ref 13–17.7)
HGB UR QL STRIP.AUTO: NEGATIVE
KETONES UR QL STRIP: ABNORMAL
LEUKOCYTE ESTERASE UR QL STRIP.AUTO: NEGATIVE
MCH RBC QN AUTO: 29.8 PG (ref 26.6–33)
MCHC RBC AUTO-ENTMCNC: 33.6 G/DL (ref 31.5–35.7)
MCV RBC AUTO: 88.5 FL (ref 79–97)
NITRITE UR QL STRIP: NEGATIVE
PH UR STRIP.AUTO: 5.5 [PH] (ref 5–8)
PLATELET # BLD AUTO: 159 10*3/MM3 (ref 140–450)
PMV BLD AUTO: 10 FL (ref 6–12)
POTASSIUM SERPL-SCNC: 3.8 MMOL/L (ref 3.5–5.2)
PROT SERPL-MCNC: 7.4 G/DL (ref 6–8.5)
PROT UR QL STRIP: NEGATIVE
QT INTERVAL: 390 MS
RBC # BLD AUTO: 4.87 10*6/MM3 (ref 4.14–5.8)
RH BLD: NEGATIVE
SODIUM SERPL-SCNC: 133 MMOL/L (ref 136–145)
SP GR UR STRIP: 1.02 (ref 1–1.03)
UROBILINOGEN UR QL STRIP: ABNORMAL
WBC NRBC COR # BLD: 6.14 10*3/MM3 (ref 3.4–10.8)

## 2022-11-29 PROCEDURE — 36415 COLL VENOUS BLD VENIPUNCTURE: CPT

## 2022-11-29 PROCEDURE — 81003 URINALYSIS AUTO W/O SCOPE: CPT

## 2022-11-29 PROCEDURE — 80053 COMPREHEN METABOLIC PANEL: CPT

## 2022-11-29 PROCEDURE — 86900 BLOOD TYPING SEROLOGIC ABO: CPT

## 2022-11-29 PROCEDURE — 85027 COMPLETE CBC AUTOMATED: CPT

## 2022-11-29 PROCEDURE — 93005 ELECTROCARDIOGRAM TRACING: CPT

## 2022-11-29 PROCEDURE — 71046 X-RAY EXAM CHEST 2 VIEWS: CPT

## 2022-11-29 PROCEDURE — 86901 BLOOD TYPING SEROLOGIC RH(D): CPT

## 2022-11-29 PROCEDURE — 93010 ELECTROCARDIOGRAM REPORT: CPT | Performed by: INTERNAL MEDICINE

## 2022-11-29 RX ORDER — MULTIVITAMIN WITH IRON
1 TABLET ORAL 2 TIMES DAILY
COMMUNITY

## 2022-11-29 RX ORDER — HYDROCODONE BITARTRATE AND ACETAMINOPHEN 7.5; 325 MG/1; MG/1
1 TABLET ORAL 3 TIMES DAILY PRN
COMMUNITY
End: 2022-12-13 | Stop reason: HOSPADM

## 2022-11-29 RX ORDER — FERROUS SULFATE 325(65) MG
325 TABLET ORAL DAILY
COMMUNITY

## 2022-11-29 RX ORDER — VITAMIN E 268 MG
1-2 CAPSULE ORAL DAILY
COMMUNITY

## 2022-12-01 ENCOUNTER — ANESTHESIA EVENT (OUTPATIENT)
Dept: PERIOP | Facility: HOSPITAL | Age: 72
End: 2022-12-01

## 2022-12-12 ENCOUNTER — HOSPITAL ENCOUNTER (OUTPATIENT)
Facility: HOSPITAL | Age: 72
Discharge: HOME | End: 2022-12-13
Attending: UROLOGY | Admitting: UROLOGY

## 2022-12-12 ENCOUNTER — ANESTHESIA (OUTPATIENT)
Dept: PERIOP | Facility: HOSPITAL | Age: 72
End: 2022-12-12

## 2022-12-12 DIAGNOSIS — C61 PROSTATE CANCER: ICD-10-CM

## 2022-12-12 LAB
ABO GROUP BLD: NORMAL
BLD GP AB SCN SERPL QL: NEGATIVE
RH BLD: NEGATIVE
T&S EXPIRATION DATE: NORMAL

## 2022-12-12 PROCEDURE — A9270 NON-COVERED ITEM OR SERVICE: HCPCS | Performed by: UROLOGY

## 2022-12-12 PROCEDURE — 25010000002 ONDANSETRON PER 1 MG: Performed by: NURSE ANESTHETIST, CERTIFIED REGISTERED

## 2022-12-12 PROCEDURE — 25010000002 CEFAZOLIN IN DEXTROSE 2-4 GM/100ML-% SOLUTION: Performed by: UROLOGY

## 2022-12-12 PROCEDURE — 86901 BLOOD TYPING SEROLOGIC RH(D): CPT | Performed by: UROLOGY

## 2022-12-12 PROCEDURE — 88309 TISSUE EXAM BY PATHOLOGIST: CPT | Performed by: UROLOGY

## 2022-12-12 PROCEDURE — 88307 TISSUE EXAM BY PATHOLOGIST: CPT | Performed by: UROLOGY

## 2022-12-12 PROCEDURE — 55866 LAPS SURG PRST8ECT RPBIC RAD: CPT | Performed by: UROLOGY

## 2022-12-12 PROCEDURE — 86900 BLOOD TYPING SEROLOGIC ABO: CPT | Performed by: UROLOGY

## 2022-12-12 PROCEDURE — 86850 RBC ANTIBODY SCREEN: CPT | Performed by: UROLOGY

## 2022-12-12 PROCEDURE — 63710000001 OXYCODONE-ACETAMINOPHEN 10-325 MG TABLET: Performed by: UROLOGY

## 2022-12-12 PROCEDURE — 51990 LAPARO URETHRAL SUSPENSION: CPT | Performed by: UROLOGY

## 2022-12-12 PROCEDURE — 63710000001 SERTRALINE 50 MG TABLET: Performed by: UROLOGY

## 2022-12-12 PROCEDURE — 25010000002 PROPOFOL 10 MG/ML EMULSION: Performed by: NURSE ANESTHETIST, CERTIFIED REGISTERED

## 2022-12-12 PROCEDURE — 0 HYDROMORPHONE 1 MG/ML SOLUTION: Performed by: NURSE ANESTHETIST, CERTIFIED REGISTERED

## 2022-12-12 PROCEDURE — 63710000001 OXYCODONE 5 MG TABLET: Performed by: NURSE ANESTHETIST, CERTIFIED REGISTERED

## 2022-12-12 PROCEDURE — S0260 H&P FOR SURGERY: HCPCS | Performed by: UROLOGY

## 2022-12-12 PROCEDURE — A9270 NON-COVERED ITEM OR SERVICE: HCPCS | Performed by: NURSE ANESTHETIST, CERTIFIED REGISTERED

## 2022-12-12 PROCEDURE — 25010000002 MIDAZOLAM PER 1 MG: Performed by: ANESTHESIOLOGY

## 2022-12-12 PROCEDURE — 63710000001 PANTOPRAZOLE 40 MG TABLET DELAYED-RELEASE: Performed by: UROLOGY

## 2022-12-12 PROCEDURE — 25010000002 DEXAMETHASONE PER 1 MG: Performed by: NURSE ANESTHETIST, CERTIFIED REGISTERED

## 2022-12-12 PROCEDURE — 38571 LAPAROSCOPY LYMPHADENECTOMY: CPT | Performed by: UROLOGY

## 2022-12-12 PROCEDURE — 63710000001 GABAPENTIN 300 MG CAPSULE: Performed by: UROLOGY

## 2022-12-12 DEVICE — SEAL HEMO SURG ARISTA/AH ABS/PWDR 3GM: Type: IMPLANTABLE DEVICE | Site: ABDOMEN | Status: FUNCTIONAL

## 2022-12-12 DEVICE — ABSORBABLE WOUND CLOSURE DEVICE
Type: IMPLANTABLE DEVICE | Site: ABDOMEN | Status: FUNCTIONAL
Brand: V-LOC 180

## 2022-12-12 DEVICE — ABSORBABLE WOUND CLOSURE DEVICE
Type: IMPLANTABLE DEVICE | Site: ABDOMEN | Status: FUNCTIONAL
Brand: V-LOC 90

## 2022-12-12 RX ORDER — PROMETHAZINE HYDROCHLORIDE 12.5 MG/1
25 TABLET ORAL ONCE AS NEEDED
Status: DISCONTINUED | OUTPATIENT
Start: 2022-12-12 | End: 2022-12-12

## 2022-12-12 RX ORDER — ONDANSETRON 4 MG/1
4 TABLET, FILM COATED ORAL EVERY 6 HOURS PRN
Status: DISCONTINUED | OUTPATIENT
Start: 2022-12-12 | End: 2022-12-13 | Stop reason: HOSPADM

## 2022-12-12 RX ORDER — ACETAMINOPHEN 650 MG/1
650 SUPPOSITORY RECTAL EVERY 4 HOURS PRN
Status: DISCONTINUED | OUTPATIENT
Start: 2022-12-12 | End: 2022-12-13 | Stop reason: HOSPADM

## 2022-12-12 RX ORDER — BUPIVACAINE HYDROCHLORIDE 5 MG/ML
INJECTION, SOLUTION EPIDURAL; INTRACAUDAL AS NEEDED
Status: DISCONTINUED | OUTPATIENT
Start: 2022-12-12 | End: 2022-12-12 | Stop reason: HOSPADM

## 2022-12-12 RX ORDER — PROMETHAZINE HYDROCHLORIDE 25 MG/1
25 SUPPOSITORY RECTAL ONCE AS NEEDED
Status: DISCONTINUED | OUTPATIENT
Start: 2022-12-12 | End: 2022-12-12

## 2022-12-12 RX ORDER — OXYCODONE HYDROCHLORIDE 5 MG/1
5 TABLET ORAL
Status: DISCONTINUED | OUTPATIENT
Start: 2022-12-12 | End: 2022-12-12

## 2022-12-12 RX ORDER — SODIUM CHLORIDE, SODIUM LACTATE, POTASSIUM CHLORIDE, CALCIUM CHLORIDE 600; 310; 30; 20 MG/100ML; MG/100ML; MG/100ML; MG/100ML
9 INJECTION, SOLUTION INTRAVENOUS CONTINUOUS PRN
Status: DISCONTINUED | OUTPATIENT
Start: 2022-12-12 | End: 2022-12-13 | Stop reason: HOSPADM

## 2022-12-12 RX ORDER — CEFAZOLIN SODIUM 2 G/100ML
2 INJECTION, SOLUTION INTRAVENOUS ONCE
Status: COMPLETED | OUTPATIENT
Start: 2022-12-12 | End: 2022-12-12

## 2022-12-12 RX ORDER — ONDANSETRON 2 MG/ML
4 INJECTION INTRAMUSCULAR; INTRAVENOUS ONCE AS NEEDED
Status: DISCONTINUED | OUTPATIENT
Start: 2022-12-12 | End: 2022-12-12

## 2022-12-12 RX ORDER — ACETAMINOPHEN 500 MG
1000 TABLET ORAL ONCE
Status: COMPLETED | OUTPATIENT
Start: 2022-12-12 | End: 2022-12-12

## 2022-12-12 RX ORDER — ONDANSETRON 2 MG/ML
4 INJECTION INTRAMUSCULAR; INTRAVENOUS EVERY 6 HOURS PRN
Status: DISCONTINUED | OUTPATIENT
Start: 2022-12-12 | End: 2022-12-13 | Stop reason: HOSPADM

## 2022-12-12 RX ORDER — DEXAMETHASONE SODIUM PHOSPHATE 4 MG/ML
INJECTION, SOLUTION INTRA-ARTICULAR; INTRALESIONAL; INTRAMUSCULAR; INTRAVENOUS; SOFT TISSUE AS NEEDED
Status: DISCONTINUED | OUTPATIENT
Start: 2022-12-12 | End: 2022-12-12 | Stop reason: SURG

## 2022-12-12 RX ORDER — SUCCINYLCHOLINE/SOD CL,ISO/PF 100 MG/5ML
SYRINGE (ML) INTRAVENOUS AS NEEDED
Status: DISCONTINUED | OUTPATIENT
Start: 2022-12-12 | End: 2022-12-12 | Stop reason: SURG

## 2022-12-12 RX ORDER — OXYCODONE HYDROCHLORIDE AND ACETAMINOPHEN 5; 325 MG/1; MG/1
1 TABLET ORAL EVERY 4 HOURS PRN
Status: DISCONTINUED | OUTPATIENT
Start: 2022-12-12 | End: 2022-12-13 | Stop reason: HOSPADM

## 2022-12-12 RX ORDER — GLYCOPYRROLATE 0.2 MG/ML
INJECTION INTRAMUSCULAR; INTRAVENOUS AS NEEDED
Status: DISCONTINUED | OUTPATIENT
Start: 2022-12-12 | End: 2022-12-12 | Stop reason: SURG

## 2022-12-12 RX ORDER — ONDANSETRON 2 MG/ML
INJECTION INTRAMUSCULAR; INTRAVENOUS AS NEEDED
Status: DISCONTINUED | OUTPATIENT
Start: 2022-12-12 | End: 2022-12-12 | Stop reason: SURG

## 2022-12-12 RX ORDER — NALOXONE HCL 0.4 MG/ML
0.4 VIAL (ML) INJECTION
Status: DISCONTINUED | OUTPATIENT
Start: 2022-12-12 | End: 2022-12-13 | Stop reason: HOSPADM

## 2022-12-12 RX ORDER — DEXMEDETOMIDINE HYDROCHLORIDE 100 UG/ML
INJECTION, SOLUTION INTRAVENOUS AS NEEDED
Status: DISCONTINUED | OUTPATIENT
Start: 2022-12-12 | End: 2022-12-12 | Stop reason: SURG

## 2022-12-12 RX ORDER — GABAPENTIN 300 MG/1
300 CAPSULE ORAL EVERY 12 HOURS SCHEDULED
Status: DISCONTINUED | OUTPATIENT
Start: 2022-12-12 | End: 2022-12-13 | Stop reason: HOSPADM

## 2022-12-12 RX ORDER — PROPOFOL 10 MG/ML
VIAL (ML) INTRAVENOUS AS NEEDED
Status: DISCONTINUED | OUTPATIENT
Start: 2022-12-12 | End: 2022-12-12 | Stop reason: SURG

## 2022-12-12 RX ORDER — MAGNESIUM HYDROXIDE 1200 MG/15ML
LIQUID ORAL AS NEEDED
Status: DISCONTINUED | OUTPATIENT
Start: 2022-12-12 | End: 2022-12-12 | Stop reason: HOSPADM

## 2022-12-12 RX ORDER — DOCUSATE SODIUM 100 MG/1
100 CAPSULE, LIQUID FILLED ORAL 2 TIMES DAILY PRN
Status: DISCONTINUED | OUTPATIENT
Start: 2022-12-12 | End: 2022-12-13 | Stop reason: HOSPADM

## 2022-12-12 RX ORDER — ROCURONIUM BROMIDE 10 MG/ML
INJECTION, SOLUTION INTRAVENOUS AS NEEDED
Status: DISCONTINUED | OUTPATIENT
Start: 2022-12-12 | End: 2022-12-12 | Stop reason: SURG

## 2022-12-12 RX ORDER — MEPERIDINE HYDROCHLORIDE 25 MG/ML
12.5 INJECTION INTRAMUSCULAR; INTRAVENOUS; SUBCUTANEOUS
Status: DISCONTINUED | OUTPATIENT
Start: 2022-12-12 | End: 2022-12-12

## 2022-12-12 RX ORDER — PANTOPRAZOLE SODIUM 40 MG/1
40 TABLET, DELAYED RELEASE ORAL EVERY MORNING
Status: DISCONTINUED | OUTPATIENT
Start: 2022-12-12 | End: 2022-12-13 | Stop reason: HOSPADM

## 2022-12-12 RX ORDER — OXYCODONE AND ACETAMINOPHEN 10; 325 MG/1; MG/1
1 TABLET ORAL EVERY 4 HOURS PRN
Status: DISCONTINUED | OUTPATIENT
Start: 2022-12-12 | End: 2022-12-13 | Stop reason: HOSPADM

## 2022-12-12 RX ORDER — MORPHINE SULFATE 2 MG/ML
2 INJECTION, SOLUTION INTRAMUSCULAR; INTRAVENOUS
Status: DISCONTINUED | OUTPATIENT
Start: 2022-12-12 | End: 2022-12-13 | Stop reason: HOSPADM

## 2022-12-12 RX ORDER — LIDOCAINE HYDROCHLORIDE 20 MG/ML
INJECTION, SOLUTION EPIDURAL; INFILTRATION; INTRACAUDAL; PERINEURAL AS NEEDED
Status: DISCONTINUED | OUTPATIENT
Start: 2022-12-12 | End: 2022-12-12 | Stop reason: SURG

## 2022-12-12 RX ORDER — PHENYLEPHRINE HCL IN 0.9% NACL 1 MG/10 ML
SYRINGE (ML) INTRAVENOUS AS NEEDED
Status: DISCONTINUED | OUTPATIENT
Start: 2022-12-12 | End: 2022-12-12 | Stop reason: SURG

## 2022-12-12 RX ORDER — MIDAZOLAM HYDROCHLORIDE 1 MG/ML
2 INJECTION INTRAMUSCULAR; INTRAVENOUS ONCE
Status: COMPLETED | OUTPATIENT
Start: 2022-12-12 | End: 2022-12-12

## 2022-12-12 RX ORDER — KETOROLAC TROMETHAMINE 30 MG/ML
15 INJECTION, SOLUTION INTRAMUSCULAR; INTRAVENOUS EVERY 6 HOURS PRN
Status: DISCONTINUED | OUTPATIENT
Start: 2022-12-12 | End: 2022-12-13 | Stop reason: HOSPADM

## 2022-12-12 RX ORDER — SODIUM CHLORIDE, SODIUM LACTATE, POTASSIUM CHLORIDE, CALCIUM CHLORIDE 600; 310; 30; 20 MG/100ML; MG/100ML; MG/100ML; MG/100ML
100 INJECTION, SOLUTION INTRAVENOUS CONTINUOUS
Status: DISCONTINUED | OUTPATIENT
Start: 2022-12-12 | End: 2022-12-13

## 2022-12-12 RX ORDER — ACETAMINOPHEN 325 MG/1
650 TABLET ORAL EVERY 4 HOURS PRN
Status: DISCONTINUED | OUTPATIENT
Start: 2022-12-12 | End: 2022-12-13 | Stop reason: HOSPADM

## 2022-12-12 RX ADMIN — PROPOFOL 200 MG: 10 INJECTION, EMULSION INTRAVENOUS at 07:30

## 2022-12-12 RX ADMIN — DEXMEDETOMIDINE HYDROCHLORIDE 30 MCG: 100 INJECTION, SOLUTION, CONCENTRATE INTRAVENOUS at 07:49

## 2022-12-12 RX ADMIN — SODIUM CHLORIDE, POTASSIUM CHLORIDE, SODIUM LACTATE AND CALCIUM CHLORIDE: 600; 310; 30; 20 INJECTION, SOLUTION INTRAVENOUS at 09:53

## 2022-12-12 RX ADMIN — SODIUM CHLORIDE, POTASSIUM CHLORIDE, SODIUM LACTATE AND CALCIUM CHLORIDE 100 ML/HR: 600; 310; 30; 20 INJECTION, SOLUTION INTRAVENOUS at 18:07

## 2022-12-12 RX ADMIN — Medication 100 MCG: at 08:05

## 2022-12-12 RX ADMIN — ROCURONIUM BROMIDE 20 MG: 10 INJECTION INTRAVENOUS at 09:26

## 2022-12-12 RX ADMIN — OXYCODONE AND ACETAMINOPHEN 1 TABLET: 10; 325 TABLET ORAL at 21:56

## 2022-12-12 RX ADMIN — PANTOPRAZOLE SODIUM 40 MG: 40 TABLET, DELAYED RELEASE ORAL at 14:33

## 2022-12-12 RX ADMIN — Medication 100 MCG: at 07:42

## 2022-12-12 RX ADMIN — GABAPENTIN 300 MG: 300 CAPSULE ORAL at 21:56

## 2022-12-12 RX ADMIN — ROCURONIUM BROMIDE 20 MG: 10 INJECTION INTRAVENOUS at 09:08

## 2022-12-12 RX ADMIN — SUGAMMADEX 200 MG: 100 INJECTION, SOLUTION INTRAVENOUS at 10:47

## 2022-12-12 RX ADMIN — MIDAZOLAM HYDROCHLORIDE 2 MG: 1 INJECTION, SOLUTION INTRAMUSCULAR; INTRAVENOUS at 07:19

## 2022-12-12 RX ADMIN — SERTRALINE HYDROCHLORIDE 50 MG: 50 TABLET ORAL at 14:33

## 2022-12-12 RX ADMIN — CEFAZOLIN SODIUM 2 G: 2 INJECTION, SOLUTION INTRAVENOUS at 07:27

## 2022-12-12 RX ADMIN — ROCURONIUM BROMIDE 10 MG: 10 INJECTION INTRAVENOUS at 07:30

## 2022-12-12 RX ADMIN — GABAPENTIN 300 MG: 300 CAPSULE ORAL at 14:33

## 2022-12-12 RX ADMIN — SODIUM CHLORIDE, POTASSIUM CHLORIDE, SODIUM LACTATE AND CALCIUM CHLORIDE 30 ML/HR: 600; 310; 30; 20 INJECTION, SOLUTION INTRAVENOUS at 06:52

## 2022-12-12 RX ADMIN — ROCURONIUM BROMIDE 30 MG: 10 INJECTION INTRAVENOUS at 08:12

## 2022-12-12 RX ADMIN — HYDROMORPHONE HYDROCHLORIDE 1 MG: 1 INJECTION, SOLUTION INTRAMUSCULAR; INTRAVENOUS; SUBCUTANEOUS at 07:30

## 2022-12-12 RX ADMIN — ROCURONIUM BROMIDE 10 MG: 10 INJECTION INTRAVENOUS at 10:15

## 2022-12-12 RX ADMIN — Medication 100 MG: at 07:31

## 2022-12-12 RX ADMIN — DEXMEDETOMIDINE HYDROCHLORIDE 20 MCG: 100 INJECTION, SOLUTION, CONCENTRATE INTRAVENOUS at 08:12

## 2022-12-12 RX ADMIN — ONDANSETRON 4 MG: 2 INJECTION INTRAMUSCULAR; INTRAVENOUS at 10:31

## 2022-12-12 RX ADMIN — DEXAMETHASONE SODIUM PHOSPHATE 8 MG: 4 INJECTION, SOLUTION INTRA-ARTICULAR; INTRALESIONAL; INTRAMUSCULAR; INTRAVENOUS; SOFT TISSUE at 07:36

## 2022-12-12 RX ADMIN — GLYCOPYRROLATE 0.2 MG: 0.2 INJECTION INTRAMUSCULAR; INTRAVENOUS at 07:27

## 2022-12-12 RX ADMIN — ROCURONIUM BROMIDE 20 MG: 10 INJECTION INTRAVENOUS at 09:59

## 2022-12-12 RX ADMIN — ROCURONIUM BROMIDE 20 MG: 10 INJECTION INTRAVENOUS at 07:52

## 2022-12-12 RX ADMIN — ACETAMINOPHEN 1000 MG: 500 TABLET ORAL at 06:53

## 2022-12-12 RX ADMIN — GLYCOPYRROLATE 0.2 MG: 0.2 INJECTION INTRAMUSCULAR; INTRAVENOUS at 08:05

## 2022-12-12 RX ADMIN — ROCURONIUM BROMIDE 20 MG: 10 INJECTION INTRAVENOUS at 08:40

## 2022-12-12 RX ADMIN — OXYCODONE HYDROCHLORIDE 5 MG: 5 TABLET ORAL at 12:20

## 2022-12-12 RX ADMIN — LIDOCAINE HYDROCHLORIDE 100 MG: 20 INJECTION, SOLUTION EPIDURAL; INFILTRATION; INTRACAUDAL; PERINEURAL at 07:30

## 2022-12-12 RX ADMIN — ROCURONIUM BROMIDE 40 MG: 10 INJECTION INTRAVENOUS at 07:36

## 2022-12-12 NOTE — ANESTHESIA PREPROCEDURE EVALUATION
Anesthesia Evaluation     Patient summary reviewed and Nursing notes reviewed   no history of anesthetic complications:  NPO Solid Status: > 8 hours  NPO Liquid Status: > 2 hours           Airway   Mallampati: I  TM distance: >3 FB  Neck ROM: full  No difficulty expected  Dental    (+) poor dentition        Pulmonary - normal exam    breath sounds clear to auscultation  (+) a smoker Former,   Cardiovascular - normal exam  Exercise tolerance: good (4-7 METS)    Rhythm: regular  Rate: normal    (+) hypertension, hyperlipidemia,       Neuro/Psych- negative ROS  GI/Hepatic/Renal/Endo - negative ROS     Musculoskeletal     Abdominal    Substance History      OB/GYN          Other      history of cancer    ROS/Med Hx Other: >4METS, HX AAA REPAIR 3/21,   CARDS OV 2019-S  STRESS, ECHO EF 55-60%, HOLD OFF ON CATH.   NOW FOLLOWS PCP, LAST OV 8/26/22, F/U 6MTHS. KT                   Anesthesia Plan    ASA 2     general       Anesthetic plan, risks, benefits, and alternatives have been provided, discussed and informed consent has been obtained with: patient.        CODE STATUS:

## 2022-12-12 NOTE — PLAN OF CARE
Goal Outcome Evaluation:  Plan of Care Reviewed With: patient            Pt up from surgery this afternoon. Pt ambulated in room and is passing flatus. VSS. Pain controlled, see EMAR. Ac in place, pink tinged urine. Tolerating regular diet well. Cheri Ames RN.

## 2022-12-12 NOTE — OP NOTE
Preoperative diagnosis  Prostate cancer    Postoperative diagnosis  Same    Procedure performed  Robotic assisted laparoscopic radical prostatectomy with urethral suspension and bilateral pelvic lymph node dissection    Attending surgeon  Rebeca Parrish MD     Assistant  Nicanor Man RN    Anesthesia  General    EBL  50 mL    Complications  None    Specimen  Prostate, right and left pelvic lymph nodes    Findings  Normal size gland, prostate seminal vesicles and vas deferens without visible  lesions.  Negative gross margins.    Indications  72 y.o. male agreed to undergo the above named procedure after discussion of the alternatives, risks and benefits.   Informed consent was obtained.      Procedure  After informed consent was obtained the patient was taken back to the  operating suite where general anesthesia was administered.  After the patient  was adequately anesthetized a timeout procedure was performed.  The patient  was placed in lithotomy position.  His abdomen and genitalia were prepped and  draped in the normal sterile fashion.  The Veress needle was initially placed and the pneumoperitoneum was established. Under direct visualization using visualized trocar a 8 mm trocar was placed just lateral to the umbilicus.    Three additional 8 mm robotic trocars were placed for triangulation of the prostate.  The da Masha XI robot was then docked in the typical fashion after placing a 12 mm assistant trocar in the right upper quadrant as well as an additional 5 mm assistant trocar.  All trocars were placed under direct  visualization.  After docking of the robot identification of the sigmoid  colon was performed.  The sigmoid colon was then reflected out of the pelvis  identifying the peritoneum over the pouch of Tye.  This was incised over  the vas and seminal vesicles.  The vas and seminal vesicles were dissected  free.  An incision was then made in the Denonvilliers fashion.  This was  split off the  posterior surface of the prostate and the space between the  rectum and the prostate was developed.  The space of Retzius was then  developed by making the incision lateral to the medial umbilical ligaments  and completely developed off the space of Retzius, the medial umbilical  ligament and the urachus were then divided.  The prostate was defatted  anteriorly.  The endopelvic fascia was incised bilaterally.  The Ac  catheter was seated against the bladder neck to delineate the bladder neck  and the anterior bladder neck was divided.  Once this was accomplished the  Ac catheter was brought into the anterior operative field and used for  traction of the prostate.  The posterior bladder neck was then divided in the  vas deferens and seminal vesicles were brought into the anterior operative  field using a da Masha monopolar scissor as well as ProGrasp the right and  left prostatic pedicles were dissected out and vessel sealer was used.    Patient had numerous tiny vessels which warranted controlling this limited nerve sparing was performed bilaterally.  Hemostasis was carried out with the vessel sealer.  This was carried out until the apex of the prostate, the tissues were  off the apical surface of the prostate. The dorsal vein complex was then sutured utilizing a suture through the periosteum of the pelvic pubic symphysis in order to elevate the urethra. The dorsal vein complex was then divided.  Subsequently the urethral was divided sharply preserving an adequate urethral stump.  There was good hemostasis noted from the dorsal vein.  We then proceeded with bilateral pelvic lymphadenopathy to include the obturator, external iliac lymph node packets, the lymph node packets were obtained and passed off.  The prostate and seminal vesicles and vas deferens were placed in EndoCatch bag.  Vesicourethral anastomosis was then carried out in a running fashion with 3-0 V-Loc suture.  At completion of the  anastomosis an 18-Taiwanese Ac catheter was placed with 10 mL of sterile  water in the balloon.  The Ac was then irrigated with 180 mL of sterile  water and the anastomosis was felt to be watertight.  Cameron was then placed  over the anastomosis and into the perivesical gutters.  The entire operative  field was covered with Cameron hemostatic agent.  After final inspection  hemostasis was noted to be excellent.  Devin-Jarad was used under direct  visualization with the assistance of the robot in order to close the fascia  of the 12 mm assistant port.  The robot was then undocked and the camera, 12  mm trocar excision was extended in order to extract the specimen.  The  specimen was noted to be completely intact inside the EndoCatch bag.  This  was passed off for pathology.  The fascia was closed in a running fashion  with 0 Vicryl suture.  The skin was closed in subcuticular 4-0 Monocryl,  Dermabond was placed as dressing.  The procedure was deemed terminated.  The patient tolerated the procedure well without complication.  He was  transferred to the operative suite to the recovery room in stable condition.  Drains used 18-Taiwanese Ac.     The first assist, Nicanor Man RN, was present and actively participated throughout the case.              Signed:  Rebeca Parrish MD   12/12/22  11:17 EST

## 2022-12-12 NOTE — ANESTHESIA POSTPROCEDURE EVALUATION
Patient: Ramirez Baltazar    Procedure Summary     Date: 12/12/22 Room / Location: [facility] OR 08 / [facility] MAIN OR    Anesthesia Start: 0725 Anesthesia Stop: 1104    Procedure: robotic assisted laparoscopic pROSTATECTOMY RADICAL WITH  bilateral pelvic LYMPH NODE DISSECTION (Bilateral: Abdomen) Diagnosis:       Prostate cancer (HCC)      (Prostate cancer (HCC) [C61])    Surgeons: Rebeca Parrish MD Provider: Aly Chen MD    Anesthesia Type: general ASA Status: 2          Anesthesia Type: general    Vitals  Vitals Value Taken Time   /59 12/12/22 1202   Temp 36.8 °C (98.3 °F) 12/12/22 1200   Pulse 61 12/12/22 1213   Resp 13 12/12/22 1200   SpO2 97 % 12/12/22 1213   Vitals shown include unvalidated device data.        Post Anesthesia Care and Evaluation    Patient location during evaluation: bedside  Patient participation: complete - patient participated  Level of consciousness: awake  Pain management: adequate    Airway patency: patent  Anesthetic complications: No anesthetic complications  PONV Status: none  Cardiovascular status: acceptable and stable  Respiratory status: acceptable  Hydration status: acceptable    Comments: An Anesthesiologist personally participated in the most demanding procedures (including induction and emergence if applicable) in the anesthesia plan, monitored the course of anesthesia administration at frequent intervals and remained physically present and available for immediate diagnosis and treatment of emergencies.

## 2022-12-12 NOTE — H&P
[facility]   Urology Preop H&P Note    Patient Name: Ramirez Baltazar  : 1950  MRN: [id number]  Primary Care Physician:  Natanael Cunha MD  Referring Physician: Rebeca Parrish MD  Date of admission: 2022    Subjective   Subjective     Reason for Consult/ Chief Complaint: Prostate cancer (HCC) [C61]    HPI:  Ramirez Baltazar is a 72 y.o. male history ofProstate cancer (HCC) [C61] who presents for further management OR.  Presents for planned Procedure(s):  robotic assisted laparoscopic pROSTATECTOMY RADICAL WITH  bilateral pelvic LYMPH NODE DISSECTION;  .    Risk, benefits, and alternatives discussed with patient prior to today. I discussed the risks of surgery including disease recurrence, bleeding, infection, damage to surrounding structurea including injury to the bowel, blood vessels or surrounding tissues, erectile dysfunction, urinary incontinence, pain,, need for further procedures, cancer recurrence, risk of anesthesia including up to death.  All questions were addressed after providing time for discussion.  Patient denies significant changes since last visit.  No new complaints today.    Review of Systems   All systems were reviewed and negative except for the above  Personal History     Past Medical History:   Diagnosis Date   • Anxiety    • Arthritis    • Benign prostatic hyperplasia    • Cancer (HCC)     PROSTATE   • Elevated PSA    • Hypertension    • Multiple bruises     LEFT ARM - SKIN INTACT       Past Surgical History:   Procedure Laterality Date   • ABDOMINAL AORTA STENT     • BACK SURGERY     • CARPAL TUNNEL RELEASE Right    • CATARACT EXTRACTION Right    • COLONOSCOPY      NORMAL   • HAND SURGERY Left 2022   • OTHER SURGICAL HISTORY      SCROTAL REPAIR D/T ACCIDENT   • TONSILLECTOMY     • TOTAL SHOULDER ARTHROPLASTY W/ DISTAL CLAVICLE EXCISION Left 2019    Procedure: TOTAL SHOULDER REVERSE ARTHROPLASTY;  Surgeon: Enoch Goldman MD;  Location:   [facility];  Service: Orthopedics       Family History: family history is not on file. Otherwise pertinent FHx was reviewed and not pertinent to current issue.    Social History:  reports that he quit smoking about 26 years ago. His smoking use included cigarettes. He has a 135.00 pack-year smoking history. He has never used smokeless tobacco. He reports current alcohol use. He reports that he does not use drugs.    Home Medications:  HYDROcodone-acetaminophen, Magnesium, Omega-3, Potassium, Vit C-Cholecalciferol-Elana Hip, Vitamin D3, Vitamin E, ferrous sulfate, gabapentin, lisinopril, omeprazole, and sertraline    Allergies:  No Known Allergies    Objective    Objective     Vitals:   Temp:  [96.8 °F (36 °C)] 96.8 °F (36 °C)  Heart Rate:  [64] 64  Resp:  [18] 18  BP: (168)/(69) 168/69    Physical Exam:   Constitutional: Awake, alert   Respiratory: Clear, nonlabored respirations    Cardiovascular: Regular rate, no chest retractions   gastrointestinal: Appears soft, nontender     Results:    Assessment & Plan   Assessment / Plan     Brief Patient Summary:  Ramirez Baltazar is a 72 y.o. male who     Active Hospital Problems:  Active Hospital Problems    Diagnosis    • **Prostate cancer (HCC)        Plan:   Proceed to the OR for planned procedure, Procedure(s):  robotic assisted laparoscopic pROSTATECTOMY RADICAL WITH  bilateral pelvic LYMPH NODE DISSECTION,    Risk, benefits, and alternatives discussed with patient at length he is agreeable to proceed  All questions addressed      Electronically signed by Rebeca Parrish MD, 12/12/22, 7:11 AM EST.

## 2022-12-12 NOTE — ADDENDUM NOTE
Addendum  created 12/12/22 1334 by Alyssa Leahy CRNA    Flowsheet accepted, Intraprocedure Flowsheets edited

## 2022-12-13 VITALS
RESPIRATION RATE: 16 BRPM | SYSTOLIC BLOOD PRESSURE: 131 MMHG | DIASTOLIC BLOOD PRESSURE: 58 MMHG | HEIGHT: 75 IN | HEART RATE: 59 BPM | TEMPERATURE: 97.6 F | BODY MASS INDEX: 30.54 KG/M2 | OXYGEN SATURATION: 97 % | WEIGHT: 245.59 LBS

## 2022-12-13 LAB
ANION GAP SERPL CALCULATED.3IONS-SCNC: 8.3 MMOL/L (ref 5–15)
BASOPHILS # BLD AUTO: 0.02 10*3/MM3 (ref 0–0.2)
BASOPHILS NFR BLD AUTO: 0.1 % (ref 0–1.5)
BUN SERPL-MCNC: 15 MG/DL (ref 8–23)
BUN/CREAT SERPL: 18.5 (ref 7–25)
CALCIUM SPEC-SCNC: 9.2 MG/DL (ref 8.6–10.5)
CHLORIDE SERPL-SCNC: 101 MMOL/L (ref 98–107)
CO2 SERPL-SCNC: 27.7 MMOL/L (ref 22–29)
CREAT SERPL-MCNC: 0.81 MG/DL (ref 0.76–1.27)
DEPRECATED RDW RBC AUTO: 44.6 FL (ref 37–54)
EGFRCR SERPLBLD CKD-EPI 2021: 93.7 ML/MIN/1.73
EOSINOPHIL # BLD AUTO: 0.02 10*3/MM3 (ref 0–0.4)
EOSINOPHIL NFR BLD AUTO: 0.1 % (ref 0.3–6.2)
ERYTHROCYTE [DISTWIDTH] IN BLOOD BY AUTOMATED COUNT: 13.6 % (ref 12.3–15.4)
GLUCOSE SERPL-MCNC: 112 MG/DL (ref 65–99)
HCT VFR BLD AUTO: 37.2 % (ref 37.5–51)
HGB BLD-MCNC: 12.4 G/DL (ref 13–17.7)
IMM GRANULOCYTES # BLD AUTO: 0.05 10*3/MM3 (ref 0–0.05)
IMM GRANULOCYTES NFR BLD AUTO: 0.4 % (ref 0–0.5)
LYMPHOCYTES # BLD AUTO: 2.46 10*3/MM3 (ref 0.7–3.1)
LYMPHOCYTES NFR BLD AUTO: 18.4 % (ref 19.6–45.3)
MCH RBC QN AUTO: 30 PG (ref 26.6–33)
MCHC RBC AUTO-ENTMCNC: 33.3 G/DL (ref 31.5–35.7)
MCV RBC AUTO: 90.1 FL (ref 79–97)
MONOCYTES # BLD AUTO: 0.9 10*3/MM3 (ref 0.1–0.9)
MONOCYTES NFR BLD AUTO: 6.7 % (ref 5–12)
NEUTROPHILS NFR BLD AUTO: 74.3 % (ref 42.7–76)
NEUTROPHILS NFR BLD AUTO: 9.93 10*3/MM3 (ref 1.7–7)
NRBC BLD AUTO-RTO: 0 /100 WBC (ref 0–0.2)
PLATELET # BLD AUTO: 173 10*3/MM3 (ref 140–450)
PMV BLD AUTO: 9.8 FL (ref 6–12)
POTASSIUM SERPL-SCNC: 4.7 MMOL/L (ref 3.5–5.2)
RBC # BLD AUTO: 4.13 10*6/MM3 (ref 4.14–5.8)
SODIUM SERPL-SCNC: 137 MMOL/L (ref 136–145)
WBC NRBC COR # BLD: 13.38 10*3/MM3 (ref 3.4–10.8)

## 2022-12-13 PROCEDURE — A9270 NON-COVERED ITEM OR SERVICE: HCPCS | Performed by: UROLOGY

## 2022-12-13 PROCEDURE — 51990 LAPARO URETHRAL SUSPENSION: CPT | Performed by: SPECIALIST/TECHNOLOGIST, OTHER

## 2022-12-13 PROCEDURE — 63710000001 PANTOPRAZOLE 40 MG TABLET DELAYED-RELEASE: Performed by: UROLOGY

## 2022-12-13 PROCEDURE — 55866 LAPS SURG PRST8ECT RPBIC RAD: CPT | Performed by: SPECIALIST/TECHNOLOGIST, OTHER

## 2022-12-13 PROCEDURE — 38571 LAPAROSCOPY LYMPHADENECTOMY: CPT | Performed by: SPECIALIST/TECHNOLOGIST, OTHER

## 2022-12-13 PROCEDURE — 63710000001 OXYCODONE-ACETAMINOPHEN 10-325 MG TABLET: Performed by: UROLOGY

## 2022-12-13 PROCEDURE — 80048 BASIC METABOLIC PNL TOTAL CA: CPT | Performed by: UROLOGY

## 2022-12-13 PROCEDURE — 85025 COMPLETE CBC W/AUTO DIFF WBC: CPT | Performed by: UROLOGY

## 2022-12-13 RX ORDER — DOCUSATE SODIUM 100 MG/1
100 CAPSULE, LIQUID FILLED ORAL 2 TIMES DAILY
Qty: 30 CAPSULE | Refills: 0 | Status: SHIPPED | OUTPATIENT
Start: 2022-12-13

## 2022-12-13 RX ORDER — OXYCODONE AND ACETAMINOPHEN 7.5; 325 MG/1; MG/1
1 TABLET ORAL EVERY 6 HOURS PRN
Qty: 20 TABLET | Refills: 0 | Status: SHIPPED | OUTPATIENT
Start: 2022-12-13 | End: 2023-03-01

## 2022-12-13 RX ADMIN — PANTOPRAZOLE SODIUM 40 MG: 40 TABLET, DELAYED RELEASE ORAL at 07:20

## 2022-12-13 RX ADMIN — OXYCODONE AND ACETAMINOPHEN 1 TABLET: 10; 325 TABLET ORAL at 03:29

## 2022-12-13 RX ADMIN — SODIUM CHLORIDE, POTASSIUM CHLORIDE, SODIUM LACTATE AND CALCIUM CHLORIDE 100 ML/HR: 600; 310; 30; 20 INJECTION, SOLUTION INTRAVENOUS at 03:30

## 2022-12-13 NOTE — PLAN OF CARE
Goal Outcome Evaluation:         Pt did great overnight. Pain controlled with po pain meds. Ac draining clear yellow urine. Maye Granado RN

## 2022-12-13 NOTE — DISCHARGE SUMMARY
[facility]   DISCHARGE SUMMARY      Name:  Ramirez Baltazar   Age:  72 y.o.  Sex:  male  :  1950  MRN:  [id number]   Visit Number:  [id number]  Primary Care Physician:  Natanael Cunha MD  Date of Discharge:  2022  Admission Date:  2022      Discharge Diagnosis:       Active Hospital Problems    Diagnosis  POA    **Prostate cancer (HCC) [C61]  Yes      Resolved Hospital Problems   No resolved problems to display.         Presenting Problem/History of Present Illness:    Prostate cancer (HCC) [C61]         Hospital Course:    Patient underwent the below procedure.  He tolerated procedure well.  Please see operative report for further details.  Patient was admitted postoperatively for pain control and monitoring.  Patient remained stable during admission.  Postoperatively he was able to ambulate as well as tolerate a diet.  Catheter care teaching and instructions provided by nursing staff.  At time of discharge patient was ambulating without assist, tolerating regular diet, and pain was controlled with p.o. medications.  At this time all goals of inpatient care meant patient is deemed ready for discharge home     Procedures Performed:    Procedure(s):  robotic assisted laparoscopic pROSTATECTOMY RADICAL WITH  bilateral pelvic LYMPH NODE DISSECTION       Consults:     Consults       No orders found from 2022 to 2022.            Pertinent Test Results:     Lab Results (all)       Procedure Component Value Units Date/Time    CBC & Differential [[id number]]  (Abnormal) Collected: 22    Specimen: Blood Updated: 22    Narrative:      The following orders were created for panel order CBC & Differential.  Procedure                               Abnormality         Status                     ---------                               -----------         ------                     CBC Auto Differential[[id number]]        Abnormal            Final result         "         Please view results for these tests on the individual orders.    CBC Auto Differential [[id number]]  (Abnormal) Collected: 12/13/22 0748    Specimen: Blood Updated: 12/13/22 0801     WBC 13.38 10*3/mm3      RBC 4.13 10*6/mm3      Hemoglobin 12.4 g/dL      Hematocrit 37.2 %      MCV 90.1 fL      MCH 30.0 pg      MCHC 33.3 g/dL      RDW 13.6 %      RDW-SD 44.6 fl      MPV 9.8 fL      Platelets 173 10*3/mm3      Neutrophil % 74.3 %      Lymphocyte % 18.4 %      Monocyte % 6.7 %      Eosinophil % 0.1 %      Basophil % 0.1 %      Immature Grans % 0.4 %      Neutrophils, Absolute 9.93 10*3/mm3      Lymphocytes, Absolute 2.46 10*3/mm3      Monocytes, Absolute 0.90 10*3/mm3      Eosinophils, Absolute 0.02 10*3/mm3      Basophils, Absolute 0.02 10*3/mm3      Immature Grans, Absolute 0.05 10*3/mm3      nRBC 0.0 /100 WBC     Basic Metabolic Panel [[id number]] Collected: 12/13/22 0748    Specimen: Blood Updated: 12/13/22 0757            Imaging Results (All)       None            Condition on Discharge:          Vital Signs:    /58 (BP Location: Right arm, Patient Position: Lying)   Pulse 59   Temp 97.6 °F (36.4 °C) (Oral)   Resp 16   Ht 190.5 cm (75\")   Wt 111 kg (245 lb 9.5 oz)   SpO2 97%   BMI 30.70 kg/m²     Discharge Disposition:    Home or Self Care    Discharge Medication:       Discharge Medications        New Medications        Instructions Start Date   docusate sodium 100 MG capsule  Commonly known as: Colace   100 mg, Oral, 2 Times Daily, Until having regular bowel movements      oxyCODONE-acetaminophen 7.5-325 MG per tablet  Commonly known as: Percocet   1 tablet, Oral, Every 6 Hours PRN             Continue These Medications        Instructions Start Date   ferrous sulfate 325 (65 FE) MG tablet   325 mg, Oral, Daily      gabapentin 600 MG tablet  Commonly known as: NEURONTIN   600 mg, Oral, 3 Times Daily      lisinopril 20 MG tablet  Commonly known as: PRINIVIL,ZESTRIL   20 mg, Oral, Daily    "   Magnesium 250 MG tablet   1 tablet, Oral, 2 Times Daily      Omega-3 1000 MG capsule   1 capsule, Oral, Daily      omeprazole 20 MG capsule  Commonly known as: priLOSEC   20 mg, Daily      sertraline 50 MG tablet  Commonly known as: ZOLOFT   50 mg, Oral, Daily      VITAMIN C & D3/NABILA HIPS PO   1 tablet, Oral, Daily      Vitamin D3 25 MCG (1000 UT) capsule   1,000 Units, Oral, Daily      Vitamin E 180 MG (400 UNIT) capsule capsule   1-2 capsules, Oral, Daily, REPORTS TAKES 1 A DAY ONE WEEK AND THEN TAKES 2 A DAY THE NEXT WEEK             Stop These Medications      HYDROcodone-acetaminophen 7.5-325 MG per tablet  Commonly known as: NORCO              Discharge Diet:     Diet Instructions       Advance Diet as Tolerated      Diet:      Diet Texture / Consistency: Regular    Be sure to drink plenty of fluids until urine is clear            Activity at Discharge:     Activity Instructions       Discharge Activity      Activity: You will likely feel tired for 1-2 weeks or longer after surgery.  Simple tasks may tire you.  We encourage you to nap during the day.  Your activity and energy will improve each day. Try to walk as much as tolerated. It is ok to go up and down stairs as tolerated. Walk at least twice a day for 20 minutes at a time.  Start the day you leave the hospital and continue for 5 weeks after surgery.     Do not drive while taking prescription pain medication (narcotic).       Avoid twisting motion (for example, golf swings or tennis swings) for 5 weeks.  Do not ride a bicycle or riding lawnmower for 12 weeks after surgery.  Do not lift anything over 15 pounds for 4 weeks.  15 pounds is about a gallon of milk.     Caring for your wound: Your wound may itch or feel a bit irritated.  It is normal to feel a firm ridge under the wound as it heals.  The scar will get smaller and slowly fade in 6-18 months.   You may shower.  Do not soak in a tub, pool or Jacuzzi for 4 weeks.  Your stitches are under your  skin and will dissolve (meltaway) on their own. Wear loose pants with an elastic waistband to feel more comfortable.     Eating and drinking: You may have less of an appetite right after surgery.  Your appetite should steadily improve.  Drinking is more important than eating.  Be sure to drink plenty of fluids and stay hydrated.     Medications: Follow the instructions provided to you at time of discharge regarding your home medications     Your pain should improve each day after surgery.  Take the prescription pain medication for severe pain as needed.  As your pain improves, it is important that you decrease the amount of prescription pain medicine as much as possible. Prescription pain medication is unable to be refilled over the phone.     Do not take additional Tylenol while taking the prescription pain medication.  It is okay to take ibuprofen while taking the prescription pain medication as needed for additional discomfort.  Once no longer taking prescription pain medicine, you may alternate between ibuprofen and Tylenol as needed.     Many patients experience constipation after surgery, which can cause significant discomfort. It may take up to a week to have a bowel movement. You should take a stool softener (Colace or Docusate) twice a day and should add Miralax once daily, if needed, until you are having bowel movements regularly.     Catheter care: Maintain catheter in place until follow-up with your urologist.  Empty catheter bag into the toilet as needed.  Ensure that catheter tubing does not become kinked and is allowed to drain at all times.  It is okay to shower with catheter in place; do not soak in water or take a tub bath. Keep around catheter clean and dry.  Keep catheter secured so that it is not pulled with movement.    May place leg bag during the day and bedside drainage bag at nighttime as desired for comfort. It is normal for a small amount of blood to come out through the catheter or in  the urine. Do not engage in sexual activity while catheter is in place. You may have some leaking around the catheter.  Be sure that the tubing is not kinked and the catheter is permitted to drain. Use an incontinence pad or some gauze around the penis.  If you have leaking and feels strong urge to urinate, you may be having bladder spasms.  Call office and medication can be prescribed.     You will be scheduled for follow-up 7-10 days after surgery.  You will have a study performed in radiology prior to follow-up appointment- that should be scheduled in radiology prior to discharge.      Notify our office if any of the following occurs:  Increased pain, redness, or swelling at the incision  Fever greater than 101.5°F  Persistent nausea, vomiting or inability to have bowel movements  Shortness of breath  Catheter not draining            Follow-up Appointments:    Future Appointments   Date Time Provider Department Center   12/21/2022  9:15 AM [facility]beca [facility]   3/1/2023  9:00 AM Natanael Cunha MD [facility]     Additional Instructions for the Follow-ups that You Need to Schedule       Discharge Follow-up with Specified Provider: Dr. Parrish, cystogram prior; 1 Week   As directed      To: Dr. Parrish, cystogram prior    Follow Up: 1 Week    Follow Up Details: [phone]         [place] Cystogram Minimum 3 View   Dec 20, 2022      Exam reason: assess anastomosis; s/p RALP    Release to patient: Immediate                 Test Results Pending at Discharge:    Pending Labs       Order Current Status    Tissue Pathology Exam In process               Rebeca Parrish MD  12/14/22  07:31 EST

## 2022-12-15 LAB
CYTO UR: NORMAL
LAB AP CASE REPORT: NORMAL
LAB AP CLINICAL INFORMATION: NORMAL
LAB AP SYNOPTIC CHECKLIST: NORMAL
PATH REPORT.FINAL DX SPEC: NORMAL
PATH REPORT.GROSS SPEC: NORMAL

## 2022-12-16 ENCOUNTER — TELEPHONE (OUTPATIENT)
Dept: UROLOGY | Facility: CLINIC | Age: 72
End: 2022-12-16

## 2022-12-16 PROBLEM — Z98.1 STATUS POST LUMBAR SPINAL FUSION: Status: ACTIVE | Noted: 2022-12-07

## 2022-12-16 PROBLEM — F11.90 CHRONIC, CONTINUOUS USE OF OPIOIDS: Status: ACTIVE | Noted: 2022-12-07

## 2022-12-16 PROBLEM — M25.562 LEFT KNEE PAIN: Status: ACTIVE | Noted: 2022-12-07

## 2022-12-16 PROBLEM — M54.16 LUMBAR RADICULOPATHY: Status: ACTIVE | Noted: 2022-12-07

## 2022-12-16 NOTE — TELEPHONE ENCOUNTER
SPOKE TO PT AND ADVISED THAT HIS CYSTOGRAM IS SCHEDULED FOR 12/20 AT [facility] AT 930AM. PT EXPRESSED UNDERSTANDING AND IS AGREEABLE.

## 2022-12-20 ENCOUNTER — HOSPITAL ENCOUNTER (OUTPATIENT)
Dept: GENERAL RADIOLOGY | Facility: HOSPITAL | Age: 72
Discharge: HOME | End: 2022-12-20
Admitting: UROLOGY

## 2022-12-20 DIAGNOSIS — C61 PROSTATE CANCER: ICD-10-CM

## 2022-12-20 PROCEDURE — 0 IOPAMIDOL PER 1 ML: Performed by: UROLOGY

## 2022-12-20 PROCEDURE — 74430 CONTRAST X-RAY BLADDER: CPT

## 2022-12-20 RX ADMIN — IOPAMIDOL 200 ML: 510 INJECTION, SOLUTION INTRAVASCULAR at 09:39

## 2022-12-20 NOTE — PROGRESS NOTES
UROLOGY OFFICE follow-up NOTE    Subjective   HPI  Ramirez Baltazar is a 72 y.o. male. Presents for further management discussion of prostate cancer per Dr. Alcaraz.  Initially saw Dr. Alcaraz due to difficulty urinating, nocturia, straining to void.  Noted to have elevated PSA of 7.39 from 1 just 1 year prior.   In office prostate biopsy was performed indicating [place] 4+4=8 adenocarcinoma of the prostate.    Dr. Alcaraz has recommended management consideration of radical prostatectomy given patient's age and other work-up.      The patient confirms he had a prostate biopsy with Dr. Alcaraz on 09/08/2022 due to elevated PSA which went from 1 to 7 over a year.       He had an aneurysm repair done by Dr. Bolton recently. He has a follow-up appointment with Dr. Bustamante on 09/21/2022. He denies any abdominal surgeries.   The patient reports that he recently had a CT scan.   He denies being on any blood thinners.  Not currently sexually active; history of ED.     He used to smoke and quit in 1996.  Denies family history of prostate cancer  Exposed to agent orange in [place]     Update 10/12/22: Presents after staging work-up via bone scan, which is negative for metastatic disease.  He is accompanied by his wife. The patient reports that he is doing well. He states that he would like to move forward with a prostatectomy. His wife inquires how long he will be down after the surgery.   The patient reports that he had an accident in 1975 and broke his pelvis. He denies any bladder injury or injury to his urethra.   He reports that he had an abdominal aortic aneurysm repair performed endoscopically.  No abdominal surgeries.  He denies any new questions or concerns.      Update 12/21/2022: Patient presents for follow-up after RALP, 12/12/2022.  Cystogram prior to today's visit which is negative.  The patient reports that he is feeling weak. He states that he was constipated initially from the medication. He denies any nausea or  "vomiting. He states that he has been dealing with the pain. The patient reports that he has been doing pelvic floor exercises. He states that he has noticed a couple of times when he was using the bathroom, he would have leakage around the catheter, but he was able stop that.     ____________  PSA  8/26/2022: 7.39  8/25/2021: 1.01  8/3/2020: 0.95    RALP path, 12/12/2022: pT3aN0 ([place] 4+3=7) acinar adenocarcinoma; (+ JOSE LUIS, + bladder neck invasion, + margin left bladder neck)     TRUS prostate biopsy 9/8/2022: [place] 4+4= 8 adenocarcinoma of the prostate (8/18 cores)      Review of systems  A review of systems was performed, and positive findings are noted in the HPI.    Objective     Vital Signs:   Resp 14   Ht 190.5 cm (75\")   Wt 103 kg (226 lb)   BMI 28.25 kg/m²       Physical exam  No acute distress, well-nourished  Awake alert and oriented  Mood normal; affect normal  Abdomen: Soft, nondistended; incisions healing well    Problem List:  Patient Active Problem List   Diagnosis   • Chronic midline low back pain with bilateral sciatica   • Essential hypertension   • Dysthymic disorder   • Elevated cholesterol   • Benign prostatic hyperplasia with lower urinary tract symptoms   • History of abdominal aortic aneurysm repair   • Prostate cancer (HCC)   • Chronic, continuous use of opioids   • Left knee pain   • Lumbar radiculopathy   • Status post lumbar spinal fusion       Assessment & Plan      Diagnoses and all orders for this visit:    1. Prostate cancer (HCC) (Primary)  -     PSA DIAGNOSTIC; Future      pT3aN0 ([place] 4+3=7) acinar adenocarcinoma; (+ JOSE LUIS, + bladder neck invasion, + margin left bladder neck)    Cystogram-imaging reviewed and discussed with patient, no evidence of anastomotic leak-catheter removed. Anticipate postop incontinence, patient to wear and change briefs as needed. Patient provided information on pelvic floor exercises. He is to perform Kegel exercises at least 5 times daily for 3 " reps of 10.        Pathology discussed with patient at length.  [place] 4+3 =7, surgical margin positive at bladder neck also with associated bladder neck extraprostatic extension. Discussed that adverse pathologic features portend a higher risk of cancer recurrence and thus per AUA guidelines, adjuvant radiotherapy could be considered.  Discussed that adjuvant radiotherapy, compared to radical prostatectomy only, reduces the risk of biochemical recurrence, local recurrence, and clinical progression of cancer.  He was informed that the impact of adjuvant radiotherapy on subsequent metastasis and overall survival is less clear. Patient noted understanding and asked appropriate questions.    Will await postoperative PSA for further discussion.  Follow up in 6 weeks with PSA prior   All questions addressed          Transcribed from ambient dictation for Rebeca Parrish MD by Hoa Palacio.  12/21/22   10:29 EST    Patient or patient representative verbalized consent to the visit recording.  I have personally performed the services described in this document as transcribed by the above individual, and it is both accurate and complete.

## 2022-12-21 ENCOUNTER — OFFICE VISIT (OUTPATIENT)
Dept: UROLOGY | Facility: CLINIC | Age: 72
End: 2022-12-21

## 2022-12-21 VITALS — RESPIRATION RATE: 14 BRPM | BODY MASS INDEX: 28.1 KG/M2 | WEIGHT: 226 LBS | HEIGHT: 75 IN

## 2022-12-21 DIAGNOSIS — C61 PROSTATE CANCER: Primary | ICD-10-CM

## 2022-12-21 PROCEDURE — 99024 POSTOP FOLLOW-UP VISIT: CPT | Performed by: UROLOGY

## 2023-01-26 ENCOUNTER — LAB (OUTPATIENT)
Dept: LAB | Facility: HOSPITAL | Age: 73
End: 2023-01-26
Payer: MEDICARE

## 2023-01-26 DIAGNOSIS — C61 PROSTATE CANCER: ICD-10-CM

## 2023-01-26 LAB
ANION GAP SERPL CALCULATED.3IONS-SCNC: 8.7 MMOL/L (ref 5–15)
BUN SERPL-MCNC: 14 MG/DL (ref 8–23)
BUN/CREAT SERPL: 13.9 (ref 7–25)
CALCIUM SPEC-SCNC: 10.3 MG/DL (ref 8.6–10.5)
CHLORIDE SERPL-SCNC: 97 MMOL/L (ref 98–107)
CO2 SERPL-SCNC: 31.3 MMOL/L (ref 22–29)
CREAT SERPL-MCNC: 1.01 MG/DL (ref 0.76–1.27)
EGFRCR SERPLBLD CKD-EPI 2021: 79 ML/MIN/1.73
GLUCOSE SERPL-MCNC: 134 MG/DL (ref 65–99)
POTASSIUM SERPL-SCNC: 4.6 MMOL/L (ref 3.5–5.2)
PSA SERPL-MCNC: <0.014 NG/ML (ref 0–4)
SODIUM SERPL-SCNC: 137 MMOL/L (ref 136–145)

## 2023-01-26 PROCEDURE — 80048 BASIC METABOLIC PNL TOTAL CA: CPT

## 2023-01-26 PROCEDURE — 84153 ASSAY OF PSA TOTAL: CPT

## 2023-01-26 PROCEDURE — 36415 COLL VENOUS BLD VENIPUNCTURE: CPT

## 2023-02-07 ENCOUNTER — OFFICE VISIT (OUTPATIENT)
Dept: UROLOGY | Facility: CLINIC | Age: 73
End: 2023-02-07
Payer: MEDICARE

## 2023-02-07 VITALS — RESPIRATION RATE: 16 BRPM | BODY MASS INDEX: 29.05 KG/M2 | HEIGHT: 75 IN | WEIGHT: 233.6 LBS

## 2023-02-07 DIAGNOSIS — C61 PROSTATE CANCER: Primary | ICD-10-CM

## 2023-02-07 PROBLEM — N40.1 BENIGN PROSTATIC HYPERPLASIA WITH LOWER URINARY TRACT SYMPTOMS: Status: RESOLVED | Noted: 2022-08-08 | Resolved: 2023-02-07

## 2023-02-07 PROCEDURE — 99024 POSTOP FOLLOW-UP VISIT: CPT | Performed by: UROLOGY

## 2023-02-07 NOTE — PROGRESS NOTES
UROLOGY OFFICE follow-up NOTE    Subjective   HPI  Ramirez Baltazar is a 72 y.o. male.  Presents for follow-up of prostate cancer with PSA prior.  Prostate cancer treated with RALP, 12/12/2022.    Reports doing well. He denies any concerns.     He states that his leakage and urination are improving. He wears Depends all the time; has been able to reduce number of briefs and has noted decrease in weight. He states that if he puts 1 Depend on in the morning, he goes all through the day and night with 1.  If he does a lot of exertion, he will use extra pads with the Depends.      ____________  PSA  1/26/2023: <0.014  8/26/2022: 7.39  8/25/2021: 1.01  8/3/2020: 0.95     RALP path, 12/12/2022: pT3aN0 ([place] 4+3=7) acinar adenocarcinoma; (+ JOSE LUIS, + bladder neck invasion, + margin left bladder neck)     TRUS prostate biopsy 9/8/2022: [place] 4+4= 8 adenocarcinoma of the prostate (8/18 cores)     Review of systems  A review of systems was performed, and positive findings are noted in the HPI.    Objective     Vital Signs:   There were no vitals taken for this visit.      Physical exam  No acute distress, well-nourished  Awake alert and oriented  Mood normal; affect normal    Problem List:  Patient Active Problem List   Diagnosis   • Chronic midline low back pain with bilateral sciatica   • Essential hypertension   • Dysthymic disorder   • Elevated cholesterol   • History of abdominal aortic aneurysm repair   • Prostate cancer (HCC)   • Chronic, continuous use of opioids   • Left knee pain   • Lumbar radiculopathy   • Status post lumbar spinal fusion       Assessment & Plan      Diagnoses and all orders for this visit:    1. Prostate cancer (HCC) (Primary)      RALP path, 12/12/2022: pT3aN0 ([place] 4+3=7) acinar adenocarcinoma; (+ JOSE LUIS, + bladder neck invasion, + margin left bladder neck)    First postop PSA undetectable, <0.014  Recheck in 3 months    Provided reassurance and encouragement, continue Kegel exercises;  anticipate continued improvement with time        Patient encouraged to follow-up 3 months, with PSA prior  All questions addressed          Transcribed from ambient dictation for Rebeca Parrish MD by Hoa Palacio.  02/07/23   10:25 EST    Patient or patient representative verbalized consent to the visit recording.  I have personally performed the services described in this document as transcribed by the above individual, and it is both accurate and complete.

## 2023-03-01 ENCOUNTER — OFFICE VISIT (OUTPATIENT)
Dept: FAMILY MEDICINE CLINIC | Age: 73
End: 2023-03-01
Payer: MEDICARE

## 2023-03-01 VITALS
HEART RATE: 70 BPM | DIASTOLIC BLOOD PRESSURE: 63 MMHG | BODY MASS INDEX: 29.49 KG/M2 | SYSTOLIC BLOOD PRESSURE: 134 MMHG | HEIGHT: 75 IN | WEIGHT: 237.2 LBS

## 2023-03-01 DIAGNOSIS — R73.9 ELEVATED BLOOD SUGAR LEVEL: ICD-10-CM

## 2023-03-01 DIAGNOSIS — Z00.00 MEDICARE ANNUAL WELLNESS VISIT, SUBSEQUENT: Primary | ICD-10-CM

## 2023-03-01 DIAGNOSIS — E78.00 ELEVATED CHOLESTEROL: ICD-10-CM

## 2023-03-01 DIAGNOSIS — K21.9 GERD WITHOUT ESOPHAGITIS: ICD-10-CM

## 2023-03-01 DIAGNOSIS — H92.01 ACUTE EAR PAIN, RIGHT: ICD-10-CM

## 2023-03-01 DIAGNOSIS — F34.1 DYSTHYMIC DISORDER: ICD-10-CM

## 2023-03-01 DIAGNOSIS — I10 ESSENTIAL HYPERTENSION: ICD-10-CM

## 2023-03-01 PROBLEM — M25.562 LEFT KNEE PAIN: Status: RESOLVED | Noted: 2022-12-07 | Resolved: 2023-03-01

## 2023-03-01 PROBLEM — Z98.1 STATUS POST LUMBAR SPINAL FUSION: Status: RESOLVED | Noted: 2022-12-07 | Resolved: 2023-03-01

## 2023-03-01 PROBLEM — Z85.46 HISTORY OF PROSTATE CANCER: Status: ACTIVE | Noted: 2022-10-12

## 2023-03-01 PROBLEM — M54.16 LUMBAR RADICULOPATHY: Status: RESOLVED | Noted: 2022-12-07 | Resolved: 2023-03-01

## 2023-03-01 PROBLEM — F11.90 CHRONIC, CONTINUOUS USE OF OPIOIDS: Status: RESOLVED | Noted: 2022-12-07 | Resolved: 2023-03-01

## 2023-03-01 PROCEDURE — G0402 INITIAL PREVENTIVE EXAM: HCPCS | Performed by: FAMILY MEDICINE

## 2023-03-01 PROCEDURE — 1159F MED LIST DOCD IN RCRD: CPT | Performed by: FAMILY MEDICINE

## 2023-03-01 PROCEDURE — 1125F AMNT PAIN NOTED PAIN PRSNT: CPT | Performed by: FAMILY MEDICINE

## 2023-03-01 PROCEDURE — 99214 OFFICE O/P EST MOD 30 MIN: CPT | Performed by: FAMILY MEDICINE

## 2023-03-01 PROCEDURE — 1170F FXNL STATUS ASSESSED: CPT | Performed by: FAMILY MEDICINE

## 2023-03-01 RX ORDER — GENTAMICIN SULFATE 3 MG/ML
SOLUTION/ DROPS OPHTHALMIC
Qty: 5 ML | Refills: 0 | Status: SHIPPED | OUTPATIENT
Start: 2023-03-01

## 2023-03-01 NOTE — ASSESSMENT & PLAN NOTE
ADVICE GIVEN RE:  SEATBELT USE, ALCOHOL USE, HEALTHY DIET, ROUTINE EYE AND DENTAL EXAM, ROUTINE VACCINATIONS.    HE WILL CHECK WITH HIS PHARMACY ABOUT A SHINGRX

## 2023-03-01 NOTE — PROGRESS NOTES
The ABCs of the Annual Wellness Visit  Subsequent Medicare Wellness Visit    Subjective    Ramirez Baltazar is a 72 y.o. male who presents for a Subsequent Medicare Wellness Visit.    The following portions of the patient's history were reviewed and   updated as appropriate: allergies, current medications, past family history, past medical history, past social history, past surgical history and problem list.    Compared to one year ago, the patient feels his physical   health is better.    Compared to one year ago, the patient feels his mental   health is the same.    Recent Hospitalizations:  He was admitted within the past 365 days at [facility].       Current Medical Providers:  Patient Care Team:  Natanael Cunha MD as PCP - General (Family Medicine)  Rebeca Parrish MD as Consulting Physician (Urology)  Nikki Carney PA (Pain Medicine)    Outpatient Medications Prior to Visit   Medication Sig Dispense Refill   • Cholecalciferol (Vitamin D3) 25 MCG (1000 UT) capsule Take 1 capsule by mouth Daily.     • docusate sodium (Colace) 100 MG capsule Take 1 capsule by mouth 2 (Two) Times a Day. Until having regular bowel movements 30 capsule 0   • ferrous sulfate 325 (65 FE) MG tablet Take 1 tablet by mouth Daily.     • lisinopril (PRINIVIL,ZESTRIL) 20 MG tablet Take 1 tablet by mouth Daily. 90 tablet 3   • Magnesium 250 MG tablet Take 1 tablet by mouth 2 (Two) Times a Day.     • Omega-3 1000 MG capsule Take 1 capsule by mouth Daily.     • omeprazole (priLOSEC) 20 MG capsule 1 capsule Daily.     • sertraline (ZOLOFT) 50 MG tablet Take 1 tablet by mouth Daily. 90 tablet 3   • Vit C-Cholecalciferol-Nabila Hip (VITAMIN C & D3/NABILA HIPS PO) Take 1 tablet by mouth Daily.     • Vitamin E 180 MG (400 UNIT) capsule capsule Take 1-2 capsules by mouth Daily. REPORTS TAKES 1 A DAY ONE WEEK AND THEN TAKES 2 A DAY THE NEXT WEEK     • gabapentin (NEURONTIN) 600 MG tablet Take 1 tablet by mouth 3 (Three) Times  "a Day.     • oxyCODONE-acetaminophen (Percocet) 7.5-325 MG per tablet Take 1 tablet by mouth Every 6 (Six) Hours As Needed for Severe Pain. 20 tablet 0     No facility-administered medications prior to visit.       No opioid medication identified on active medication list. I have reviewed chart for other potential  high risk medication/s and harmful drug interactions in the elderly.          Aspirin is not on active medication list.  Aspirin use is not indicated based on review of current medical condition/s. Risk of harm outweighs potential benefits.  .    Patient Active Problem List   Diagnosis   • Chronic midline low back pain with bilateral sciatica (OTC meds only at this time)    • Essential hypertension   • Dysthymic disorder   • Elevated cholesterol   • Medicare annual wellness visit, subsequent   • History of abdominal aortic aneurysm repair   • History of prostate cancer   • GERD without esophagitis   • Elevated blood sugar level     Advance Care Planning  Advance Directive is not on file.  ACP discussion was held with the patient during this visit. Patient has an advance directive (not in EMR), copy requested.     Objective    Vitals:    03/01/23 0844   BP: 134/63   BP Location: Right arm   Patient Position: Sitting   Pulse: 70   Weight: 108 kg (237 lb 3.2 oz)   Height: 190.5 cm (75\")   PainSc:   3   PainLoc: Ear     Estimated body mass index is 29.65 kg/m² as calculated from the following:    Height as of this encounter: 190.5 cm (75\").    Weight as of this encounter: 108 kg (237 lb 3.2 oz).    BMI is >= 25 and <30. (Overweight) The following options were offered after discussion;: nutrition counseling/recommendations      Does the patient have evidence of cognitive impairment? No          HEALTH RISK ASSESSMENT    Smoking Status:  Social History     Tobacco Use   Smoking Status Former   • Packs/day: 4.50   • Years: 30.00   • Pack years: 135.00   • Types: Cigarettes   • Quit date: 1996   • Years since " quittin.1   Smokeless Tobacco Never   Tobacco Comments    1 TO 4 PPD X 30 YRS      Alcohol Consumption:  Social History     Substance and Sexual Activity   Alcohol Use Yes    Comment: OCCASIONAL     Fall Risk Screen:    MAHSAADI Fall Risk Assessment was completed, and patient is at LOW risk for falls.Assessment completed on:3/1/2023    Depression Screening:  PHQ-2/PHQ-9 Depression Screening 3/1/2023   Little Interest or Pleasure in Doing Things 0-->not at all   Feeling Down, Depressed or Hopeless 0-->not at all   PHQ-9: Brief Depression Severity Measure Score 0       Health Habits and Functional and Cognitive Screening:  Functional & Cognitive Status 3/1/2023   Do you have difficulty preparing food and eating? No   Do you have difficulty bathing yourself, getting dressed or grooming yourself? No   Do you have difficulty using the toilet? No   Do you have difficulty moving around from place to place? No   Do you have trouble with steps or getting out of a bed or a chair? No   Current Diet Well Balanced Diet   Dental Exam Up to date   Eye Exam Up to date   Exercise (times per week) 7 times per week   Current Exercises Include Walking;Other   Do you need help using the phone?  No   Are you deaf or do you have serious difficulty hearing?  Yes   Do you need help with transportation? No   Do you need help shopping? No   Do you need help preparing meals?  No   Do you need help with housework?  No   Do you need help with laundry? No   Do you need help taking your medications? No   Do you need help managing money? No   Do you ever drive or ride in a car without wearing a seat belt? No   Have you felt unusual stress, anger or loneliness in the last month? No   Who do you live with? Spouse   If you need help, do you have trouble finding someone available to you? No   Do you have difficulty concentrating, remembering or making decisions? No       Age-appropriate Screening Schedule:  Refer to the list below for future  screening recommendations based on patient's age, sex and/or medical conditions. Orders for these recommended tests are listed in the plan section. The patient has been provided with a written plan.    Health Maintenance   Topic Date Due   • COLORECTAL CANCER SCREENING  Never done   • ZOSTER VACCINE (1 of 2) 03/01/2023 (Originally 11/13/2000)   • COVID-19 Vaccine (4 - Booster) 03/03/2023 (Originally 7/4/2022)   • INFLUENZA VACCINE  03/31/2023 (Originally 8/1/2022)   • TDAP/TD VACCINES (1 - Tdap) 03/01/2024 (Originally 11/13/1969)   • LIPID PANEL  08/26/2023   • ANNUAL WELLNESS VISIT  03/01/2024   • HEPATITIS C SCREENING  Completed   • Pneumococcal Vaccine 65+  Completed   • AAA SCREEN (ONE-TIME)  Completed                CMS Preventative Services Quick Reference  Risk Factors Identified During Encounter  None Identified  The above risks/problems have been discussed with the patient.  Pertinent information has been shared with the patient in the After Visit Summary.  An After Visit Summary and PPPS were made available to the patient.    Follow Up:   Next Medicare Wellness visit to be scheduled in 1 year.       Additional E&M Note during same encounter follows:  Patient has multiple medical problems which are significant and separately identifiable that require additional work above and beyond the Medicare Wellness Visit.      Chief Complaint  Medicare Wellness-subsequent    Subjective        --TOLERATING BLOOD PRESSURE MEDICATION WITHOUT APPARENT SIDE EFFECTS  --GERD IS WELL CONTROLLED WITH THE PPI  --DEPRESSION IS DOING WELL WITH THE SSRI  --LAST LIPIDS WERE OK WITH DIETARY EFFORTS ONLY  --SNEEZED AND FELT A (POP IN THE RIGHT EAR FOUR DAYS AGO, SOME ACHING SINCE THAT TIME BUT NOT CHANGE IN HEARING  --RECENT BLOOD SUGAR , HAS BEEN > 100 IN THE PAST     Ramirez Baltazar is also being seen today for GERD, CHOLESTEROL, BLOOD PRESSURE, RIGHT EAR PAIN    Review of Systems   Constitutional: Negative for activity  "change, appetite change, chills, fatigue and fever.   HENT: Negative for congestion, ear pain, hearing loss, rhinorrhea and sore throat.    Eyes: Negative for discharge and visual disturbance.   Respiratory: Negative for cough and shortness of breath.    Cardiovascular: Negative for chest pain, palpitations and leg swelling.   Gastrointestinal: Negative for abdominal pain, nausea and vomiting.   Genitourinary: Negative for dysuria and hematuria.   Musculoskeletal: Negative for arthralgias and myalgias.   Psychiatric/Behavioral: Negative for dysphoric mood.       Objective   Vital Signs:  /63 (BP Location: Right arm, Patient Position: Sitting)   Pulse 70   Ht 190.5 cm (75\")   Wt 108 kg (237 lb 3.2 oz)   BMI 29.65 kg/m²     Physical Exam  Vitals and nursing note reviewed.   Constitutional:       General: He is not in acute distress.     Appearance: Normal appearance.   HENT:      Right Ear: Tympanic membrane normal.      Left Ear: Tympanic membrane normal.      Mouth/Throat:      Pharynx: Oropharynx is clear.   Eyes:      Conjunctiva/sclera: Conjunctivae normal.   Neck:      Vascular: No carotid bruit.   Cardiovascular:      Rate and Rhythm: Normal rate and regular rhythm.      Heart sounds: Normal heart sounds. No murmur heard.  Pulmonary:      Effort: Pulmonary effort is normal.      Breath sounds: Normal breath sounds.   Abdominal:      General: Bowel sounds are normal.      Palpations: Abdomen is soft.      Tenderness: There is no abdominal tenderness.   Musculoskeletal:      Cervical back: Neck supple.      Right lower leg: No edema.      Left lower leg: No edema.   Lymphadenopathy:      Cervical: No cervical adenopathy.   Neurological:      General: No focal deficit present.      Mental Status: He is alert.      Cranial Nerves: No cranial nerve deficit.      Coordination: Coordination normal.      Gait: Gait normal.   Psychiatric:         Mood and Affect: Mood normal.         Behavior: Behavior normal. "          The following data was reviewed by: Natanael Cunha MD on 03/01/2023:  Common labs    Common Labs 11/29/22 11/29/22 12/13/22 12/13/22 1/26/23 1/26/23    1322 1322 0748 0748 1145 1145   Glucose  109 (A)  112 (A) 134 (A)    BUN  14  15 14    Creatinine  0.86  0.81 1.01    Sodium  133 (A)  137 137    Potassium  3.8  4.7 4.6    Chloride  98  101 97 (A)    Calcium  8.6  9.2 10.3    Albumin  4.40       Total Bilirubin  0.3       Alkaline Phosphatase  65       AST (SGOT)  17       ALT (SGPT)  17       WBC 6.14  13.38 (A)      Hemoglobin 14.5  12.4 (A)      Hematocrit 43.1  37.2 (A)      Platelets 159  173      PSA      <0.014   (A) Abnormal value                       Assessment and Plan   Diagnoses and all orders for this visit:    1. Medicare annual wellness visit, subsequent (Primary)  Assessment & Plan:  ADVICE GIVEN RE:  SEATBELT USE, ALCOHOL USE, HEALTHY DIET, ROUTINE EYE AND DENTAL EXAM, ROUTINE VACCINATIONS.    HE WILL CHECK WITH HIS PHARMACY ABOUT A SHINGRX      2. Dysthymic disorder  Assessment & Plan:  Patient's depression is single episode and is moderate without psychosis. Their depression is currently in partial remission and the condition is improving with treatment. This will be reassessed at the next regular appointment. F/U as described:patient will continue current medication therapy.      3. Elevated cholesterol  Assessment & Plan:  Lipid abnormalities are improving with lifestyle modifications.  Nutritional counseling was provided.  Lipids will be reassessed in 6 months.      4. Essential hypertension  Assessment & Plan:  Hypertension is improving with treatment.  Continue current treatment regimen.  Continue current medications.  Blood pressure will be reassessed at the next regular appointment.      5. GERD without esophagitis  Assessment & Plan:  IMPROVED WITH CURRENT TREATMENT, CONTINUE SAME, WILL REEVALUATE AT NEXT VISIT       6. Acute ear pain, right  Comments:  POSSIBLE OCCULT  PERFORATION, WILL COVER A SNOTED AND CONSIDER PO. ABX OR AN ENT EVAL   Orders:  -     gentamicin (GARAMYCIN) 0.3 % ophthalmic solution; 3 DROPS IN THE RIGHT EAR, TID FOR FIVE DAYS  Dispense: 5 mL; Refill: 0    7. Elevated blood sugar level  Assessment & Plan:  WILL RECHECK AT NEXT VISIT            I spent 16 minutes caring for Ramirez on this date of service. This time includes time spent by me in the following activities:preparing for the visit and reviewing tests  Follow Up   Return in about 6 months (around 9/1/2023).  Patient was given instructions and counseling regarding his condition or for health maintenance advice. Please see specific information pulled into the AVS if appropriate.

## 2023-03-13 DIAGNOSIS — F34.1 DYSTHYMIC DISORDER: ICD-10-CM

## 2023-03-13 DIAGNOSIS — I10 ESSENTIAL HYPERTENSION: ICD-10-CM

## 2023-03-13 RX ORDER — LISINOPRIL 20 MG/1
20 TABLET ORAL DAILY
Qty: 90 TABLET | Refills: 3 | Status: SHIPPED | OUTPATIENT
Start: 2023-03-13

## 2023-03-13 NOTE — TELEPHONE ENCOUNTER
Rx Refill Note  Requested Prescriptions     Pending Prescriptions Disp Refills   • sertraline (ZOLOFT) 50 MG tablet 90 tablet 3     Sig: Take 1 tablet by mouth Daily.   • lisinopril (PRINIVIL,ZESTRIL) 20 MG tablet 90 tablet 3     Sig: Take 1 tablet by mouth Daily.      Last office visit with prescribing clinician: 3/1/2023   Last telemedicine visit with prescribing clinician: 9/1/2023   Next office visit with prescribing clinician: 9/1/2023  Last refills sent: 08/26/22, #90, 3 refills  Basic Metabolic Panel (01/26/2023 11:45)    Carmen Merino LPN  03/13/23, 15:49 EDT

## 2023-04-18 ENCOUNTER — TRANSCRIBE ORDERS (OUTPATIENT)
Dept: FAMILY MEDICINE CLINIC | Age: 73
End: 2023-04-18
Payer: MEDICARE

## 2023-04-18 ENCOUNTER — TELEPHONE (OUTPATIENT)
Dept: FAMILY MEDICINE CLINIC | Age: 73
End: 2023-04-18
Payer: MEDICARE

## 2023-04-18 DIAGNOSIS — J01.91 ACUTE RECURRENT SINUSITIS, UNSPECIFIED LOCATION: ICD-10-CM

## 2023-04-18 DIAGNOSIS — R51.9 INTRACTABLE HEADACHE, UNSPECIFIED CHRONICITY PATTERN, UNSPECIFIED HEADACHE TYPE: ICD-10-CM

## 2023-04-18 DIAGNOSIS — J34.89 NASAL DRAINAGE: Primary | ICD-10-CM

## 2023-04-18 DIAGNOSIS — R51.9 ACUTE NONINTRACTABLE HEADACHE, UNSPECIFIED HEADACHE TYPE: ICD-10-CM

## 2023-04-18 DIAGNOSIS — H92.01 ACUTE EAR PAIN, RIGHT: Primary | ICD-10-CM

## 2023-04-18 NOTE — TELEPHONE ENCOUNTER
Patient called in requesting an Allergy Referral be placed due to his headaches and sinusitis. I have pended a referral order and routed to Dr. Cunha & Team 2. If agreeable, please sign off on referral order.     Thanks

## 2023-04-24 NOTE — TELEPHONE ENCOUNTER
Pt is asking for a ENT referral due to his constant nasal drainage and h/a.  He said not a allergist.

## 2023-04-27 ENCOUNTER — LAB (OUTPATIENT)
Dept: LAB | Facility: HOSPITAL | Age: 73
End: 2023-04-27
Payer: MEDICARE

## 2023-04-27 DIAGNOSIS — C61 PROSTATE CANCER: ICD-10-CM

## 2023-04-27 LAB — PSA SERPL-MCNC: <0.014 NG/ML (ref 0–4)

## 2023-04-27 PROCEDURE — 36415 COLL VENOUS BLD VENIPUNCTURE: CPT

## 2023-04-27 PROCEDURE — 84153 ASSAY OF PSA TOTAL: CPT

## 2023-05-09 ENCOUNTER — OFFICE VISIT (OUTPATIENT)
Dept: UROLOGY | Facility: CLINIC | Age: 73
End: 2023-05-09
Payer: MEDICARE

## 2023-05-09 VITALS — WEIGHT: 242.2 LBS | BODY MASS INDEX: 30.11 KG/M2 | RESPIRATION RATE: 16 BRPM | HEIGHT: 75 IN

## 2023-05-09 DIAGNOSIS — C61 PROSTATE CANCER: Primary | ICD-10-CM

## 2023-05-09 NOTE — PROGRESS NOTES
"    UROLOGY OFFICE follow-up NOTE    Subjective   HPI  Ramirez Baltazar is a 72 y.o. male. Presents for follow-up of prostate cancer with PSA prior.  Prostate cancer treated with RALP, 12/12/2022.   The patient presents today for a follow-up. He is accompanied by his wife today.    The patient is feeling tired; generally becomes more fatigued since surgery; wife states he has not completely regained his strength back.  She states that he works for 15 minutes and then he gets very tired and has to sit down.  Considering returning to pain management.    He states that his urination is steady and slowly improving.  Doing pelvic floor exercises.  The patient's wife states that he is doing a lot better than he thinks he is.      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    Objective     Vital Signs:   Resp 16   Ht 190.5 cm (75\")   Wt 110 kg (242 lb 3.2 oz)   BMI 30.27 kg/m²       Physical exam  No acute distress, well-nourished  Awake alert and oriented  Mood normal; affect normal    Problem List:  Patient Active Problem List   Diagnosis   • Chronic midline low back pain with bilateral sciatica (OTC meds only at this time)    • Essential hypertension   • Dysthymic disorder   • Elevated cholesterol   • Medicare annual wellness visit, subsequent   • History of abdominal aortic aneurysm repair   • History of prostate cancer   • GERD without esophagitis   • Elevated blood " sugar level       Assessment & Plan      Diagnoses and all orders for this visit:    1. 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   All questions and concerns have been addressed at this time.          Transcribed from ambient dictation for Rebeca Parrish MD by Hoa Palacio.  05/09/23   12:36 EDT    Patient or patient representative verbalized consent to the visit recording.  I have personally performed the services described in this document as transcribed by the above individual, and it is both accurate and complete.

## 2023-05-10 ENCOUNTER — TELEPHONE (OUTPATIENT)
Dept: FAMILY MEDICINE CLINIC | Age: 73
End: 2023-05-10

## 2023-05-10 ENCOUNTER — OFFICE VISIT (OUTPATIENT)
Dept: FAMILY MEDICINE CLINIC | Age: 73
End: 2023-05-10
Payer: MEDICARE

## 2023-05-10 VITALS
OXYGEN SATURATION: 98 % | HEIGHT: 75 IN | SYSTOLIC BLOOD PRESSURE: 131 MMHG | DIASTOLIC BLOOD PRESSURE: 54 MMHG | WEIGHT: 246.2 LBS | BODY MASS INDEX: 30.61 KG/M2 | TEMPERATURE: 97.5 F | HEART RATE: 75 BPM

## 2023-05-10 DIAGNOSIS — R07.89 SENSATION OF CHEST TIGHTNESS: ICD-10-CM

## 2023-05-10 DIAGNOSIS — H66.93 OTITIS OF BOTH EARS: ICD-10-CM

## 2023-05-10 DIAGNOSIS — H65.93 MIDDLE EAR EFFUSION, BILATERAL: ICD-10-CM

## 2023-05-10 DIAGNOSIS — G89.29 CHRONIC NONINTRACTABLE HEADACHE, UNSPECIFIED HEADACHE TYPE: Primary | ICD-10-CM

## 2023-05-10 DIAGNOSIS — R51.9 CHRONIC NONINTRACTABLE HEADACHE, UNSPECIFIED HEADACHE TYPE: Primary | ICD-10-CM

## 2023-05-10 DIAGNOSIS — R73.9 ELEVATED BLOOD SUGAR LEVEL: ICD-10-CM

## 2023-05-10 LAB
EXPIRATION DATE: NORMAL
HBA1C MFR BLD: 5.8 %
Lab: NORMAL

## 2023-05-10 RX ORDER — FLUTICASONE PROPIONATE 50 MCG
1 SPRAY, SUSPENSION (ML) NASAL DAILY
Qty: 18 G | Refills: 3 | Status: SHIPPED | OUTPATIENT
Start: 2023-05-10

## 2023-05-10 RX ORDER — CEFDINIR 300 MG/1
300 CAPSULE ORAL 2 TIMES DAILY
Qty: 20 CAPSULE | Refills: 0 | Status: SHIPPED | OUTPATIENT
Start: 2023-05-10 | End: 2023-05-20

## 2023-05-10 RX ORDER — LORATADINE 10 MG/1
10 TABLET ORAL DAILY
Qty: 90 TABLET | Refills: 3 | Status: SHIPPED | OUTPATIENT
Start: 2023-05-10

## 2023-05-10 NOTE — TELEPHONE ENCOUNTER
Caller: Kristi Baltazar    Relationship: Emergency Contact    Best call back number: [phone]     What was the call regarding:      THE PATIENT'S  WIFE CALLED AND AN APPOINTMENT WAS SCHEDULED FOR TODAY. SHE SAID THE PATIENT HAVE BEEN HAVING CONTINUOUS HEADACHES. SHE LATER ADVISED THAT HE HAS BEEN HAVING SOME HEAVINESS AND SHORTNESS OF BREATH. SHE SAID THAT THE PATIENT WENT TO ANOTHER PROVIDER 5/9/23 AND INFORMED THEM OF HIS SYMPTOMS. THE PATIENT'S WIFE WAS TOLD   THAT THOSE ARE RED FLAGS AND WAS QUESTIONED  ON HIS SYMPTOMS. SHE WAS  ADVISED ABOUT GOING TO THE ER. SHE SAID THE PATIENT HAS BEEN UNDER A LOT OF STRESS. SHE WAS ASKED IF SHE WANTED TO SPEAK TO THE CLINICAL STAFF AND SHE DECLINED.

## 2023-05-10 NOTE — PROGRESS NOTES
Procedure     ECG 12 Lead    Date/Time: 5/10/2023 4:55 PM  Performed by: Natanael Cunha MD  Authorized by: Natanael Cunha MD   Comparison: not compared with previous ECG   Rhythm: sinus rhythm  Rate: normal  Conduction: conduction normal  ST Segments: ST segments normal  T Waves: T waves normal  QRS axis: normal    Clinical impression: normal ECG

## 2023-05-10 NOTE — PROGRESS NOTES
"Chief Complaint  Headache (Sx started in December ), Shortness of Breath, and Chest Pain (Pt states he is feeling tightness in his chest )    Subjective        Ramirez Baltazar presents to [facility]  History of Present Illness    Ramirez is here today with reports of daily headaches for the last three months that have worsened in the last 2.5 weeks. Reports aching pain on both sides of head and top of head, rates pain 3/10 at this time. Reports having a history of migraines but says these do not feel like his migraines. Says he is worried that he is having withdrawal from stopping his Norco and Gabapentin on his own three months ago that he was seeing pain management for.     Reports that he is also having some tightness in the middle of his chest at times when bending over. Denies shortness of breath upon rest or exertion, chest pain, orthopnea, or swelling in lower extremities.       Objective   Vital Signs:  /54 (BP Location: Right arm, Patient Position: Sitting, Cuff Size: Adult)   Pulse 75   Temp 97.5 °F (36.4 °C) (Oral)   Ht 190.5 cm (75\")   Wt 112 kg (246 lb 3.2 oz)   SpO2 98% Comment: room air  BMI 30.77 kg/m²   Estimated body mass index is 30.77 kg/m² as calculated from the following:    Height as of this encounter: 190.5 cm (75\").    Weight as of this encounter: 112 kg (246 lb 3.2 oz).             Physical Exam  HENT:      Head: Normocephalic.      Right Ear: No decreased hearing noted. A middle ear effusion is present. Tympanic membrane is erythematous.      Left Ear: Decreased hearing noted. A middle ear effusion is present. Tympanic membrane is injected, erythematous and bulging.      Ears:      Comments: Erythema in bilateral ear canals.      Nose: Nose normal.      Mouth/Throat:      Mouth: Mucous membranes are moist.   Eyes:      Pupils: Pupils are equal, round, and reactive to light.   Cardiovascular:      Rate and Rhythm: Normal rate and regular " rhythm.      Pulses: Normal pulses.      Heart sounds: Normal heart sounds.   Pulmonary:      Effort: Pulmonary effort is normal.      Breath sounds: Normal breath sounds.   Musculoskeletal:      Cervical back: Normal range of motion.      Right lower leg: No edema.      Left lower leg: No edema.   Lymphadenopathy:      Cervical: No cervical adenopathy.   Skin:     General: Skin is warm and dry.      Capillary Refill: Capillary refill takes less than 2 seconds.   Neurological:      Mental Status: He is alert and oriented to person, place, and time.   Psychiatric:         Mood and Affect: Mood normal.         Behavior: Behavior normal. Behavior is cooperative.        Result Review :          Results for orders placed or performed in visit on 05/10/23   POC Glycosylated Hemoglobin (Hb A1C)    Specimen: Blood   Result Value Ref Range    Hemoglobin A1C 5.8 %    Lot Number #[id number]     Expiration Date 3/31/25         Assessment and Plan   Diagnoses and all orders for this visit:    1. Chronic nonintractable headache, unspecified headache type (Primary)  Comments:  I believe this is related to his current significant ear infection, will go ahead and order CT due to duration and personal cancer history  Orders:  -     CT Head Without Contrast; Future    2. Otitis of both ears  -     cefdinir (OMNICEF) 300 MG capsule; Take 1 capsule by mouth 2 (Two) Times a Day for 10 days.  Dispense: 20 capsule; Refill: 0  -     neomycin-polymyxin-hydrocortisone (CORTISPORIN) 3.5-70337-6 otic solution; Administer 3 drops into both ears 3 (Three) Times a Day.  Dispense: 10 mL; Refill: 0    3. Middle ear effusion, bilateral  -     fluticasone (FLONASE) 50 MCG/ACT nasal spray; 1 spray into the nostril(s) as directed by provider Daily.  Dispense: 18 g; Refill: 3  -     loratadine (Claritin) 10 MG tablet; Take 1 tablet by mouth Daily.  Dispense: 90 tablet; Refill: 3    4. Sensation of chest tightness  Comments:  Only when he bends over, ER for  worsening  Orders:  -     ECG 12 Lead  -     ECG 12 Lead    5. Elevated blood sugar level  -     POC Glycosylated Hemoglobin (Hb A1C)    Go to ER if development of chest pain and/or shortness of breath. Follow up if symptoms worsen or do not improve.       Seen with NP jewel Johnson.      Follow Up   Return in 4 months (on 9/1/2023), or if symptoms worsen or fail to improve, for Next scheduled follow up with PCP.  Patient was given instructions and counseling regarding his condition or for health maintenance advice. Please see specific information pulled into the AVS if appropriate.

## 2023-05-10 NOTE — PROGRESS NOTES
Hemoglobin A1C 5.8. This means over the last 3 months your average blood sugar has been 120. This puts you at an increased risk of developing diabetes in the future. A diet low in sweets and simple carbohydrates like white bread and pasta with at least 150 minutes of physical activity is recommended at this time to decrease risk.

## 2023-05-26 ENCOUNTER — TELEPHONE (OUTPATIENT)
Dept: FAMILY MEDICINE CLINIC | Age: 73
End: 2023-05-26
Payer: MEDICARE

## 2023-05-30 ENCOUNTER — HOSPITAL ENCOUNTER (OUTPATIENT)
Dept: CT IMAGING | Facility: HOSPITAL | Age: 73
Discharge: HOME | End: 2023-05-30
Admitting: NURSE PRACTITIONER

## 2023-05-30 DIAGNOSIS — G89.29 CHRONIC NONINTRACTABLE HEADACHE, UNSPECIFIED HEADACHE TYPE: ICD-10-CM

## 2023-05-30 DIAGNOSIS — R51.9 CHRONIC NONINTRACTABLE HEADACHE, UNSPECIFIED HEADACHE TYPE: ICD-10-CM

## 2023-05-30 PROCEDURE — 70450 CT HEAD/BRAIN W/O DYE: CPT

## 2023-06-02 DIAGNOSIS — J32.2 CHRONIC ETHMOIDAL SINUSITIS: Primary | ICD-10-CM

## 2023-06-02 NOTE — PROGRESS NOTES
CT head shows no acute or concerning findings. Chronic sinus inflammation is seen. Continue allergy medications. If headaches have continued, would recommend moving forward with ENT referral for further evaluation and treatment based on these findings.

## 2023-06-07 ENCOUNTER — OFFICE VISIT (OUTPATIENT)
Dept: FAMILY MEDICINE CLINIC | Age: 73
End: 2023-06-07
Payer: MEDICARE

## 2023-06-07 VITALS
OXYGEN SATURATION: 98 % | DIASTOLIC BLOOD PRESSURE: 76 MMHG | HEART RATE: 82 BPM | BODY MASS INDEX: 29.67 KG/M2 | WEIGHT: 238.6 LBS | HEIGHT: 75 IN | SYSTOLIC BLOOD PRESSURE: 136 MMHG

## 2023-06-07 DIAGNOSIS — R07.9 EXERTIONAL CHEST PAIN: ICD-10-CM

## 2023-06-07 DIAGNOSIS — R51.9 CHRONIC NONINTRACTABLE HEADACHE, UNSPECIFIED HEADACHE TYPE: Primary | ICD-10-CM

## 2023-06-07 DIAGNOSIS — I10 ESSENTIAL HYPERTENSION: ICD-10-CM

## 2023-06-07 DIAGNOSIS — G89.29 CHRONIC NONINTRACTABLE HEADACHE, UNSPECIFIED HEADACHE TYPE: Primary | ICD-10-CM

## 2023-06-07 DIAGNOSIS — R06.02 SHORTNESS OF BREATH: ICD-10-CM

## 2023-06-07 PROCEDURE — 3075F SYST BP GE 130 - 139MM HG: CPT | Performed by: NURSE PRACTITIONER

## 2023-06-07 PROCEDURE — 1160F RVW MEDS BY RX/DR IN RCRD: CPT | Performed by: NURSE PRACTITIONER

## 2023-06-07 PROCEDURE — 1159F MED LIST DOCD IN RCRD: CPT | Performed by: NURSE PRACTITIONER

## 2023-06-07 PROCEDURE — 99214 OFFICE O/P EST MOD 30 MIN: CPT | Performed by: NURSE PRACTITIONER

## 2023-06-07 PROCEDURE — 3078F DIAST BP <80 MM HG: CPT | Performed by: NURSE PRACTITIONER

## 2023-06-07 NOTE — PROGRESS NOTES
"Chief Complaint  Headache (Sx have been ongoing since December )    Subjective        Ramirez Baltazar presents to [facility]  History of Present Illness    Ramirez is here with reports of continued daily headaches. He reports the headache is bilateral, describes as a nagging aching, rates pain 5/10. Also reports bilateral ringing in his ears. CT performed on 5/10 showed chronic sinusitis. A referral has been placed with ENT but he does not have an appointment until September. Treatments tried include Excedrin with minimal relief of headache. Reports baseline hearing loss that he once had hearing aids for but felt like they did not help him so he does not wear them. Denies vision changes and reports having an eye exam within the last year.     He also reports episodes where he feels like his blood pressure is high but when he checks it running the same as when he is in the office which is around 130s systolic. Says he feels like his heart is pounding hard when he is out doing anything exertional and is experience shortness of breath any time he bends over. Denies chest pain, dizziness, syncope, or swelling in his bilateral lower extremities.     Objective   Vital Signs:  /76 (BP Location: Left arm, Patient Position: Sitting, Cuff Size: Adult)   Pulse 82   Ht 190.5 cm (75\")   Wt 108 kg (238 lb 9.6 oz)   SpO2 98% Comment: room air  BMI 29.82 kg/m²   Estimated body mass index is 29.82 kg/m² as calculated from the following:    Height as of this encounter: 190.5 cm (75\").    Weight as of this encounter: 108 kg (238 lb 9.6 oz).       [unfilled]    Physical Exam  Constitutional:       Appearance: Normal appearance.   HENT:      Head: Normocephalic.      Right Ear: No middle ear effusion. Tympanic membrane is not injected.      Left Ear:  No middle ear effusion. Tympanic membrane is not injected.      Nose: Nose normal.      Mouth/Throat:      Mouth: Mucous membranes " are moist.   Eyes:      Pupils: Pupils are equal, round, and reactive to light.   Cardiovascular:      Rate and Rhythm: Normal rate and regular rhythm.      Pulses: Normal pulses.      Heart sounds: Normal heart sounds.   Pulmonary:      Effort: Pulmonary effort is normal.      Breath sounds: Normal breath sounds.   Musculoskeletal:      Cervical back: Normal range of motion.   Lymphadenopathy:      Cervical: No cervical adenopathy.   Skin:     General: Skin is warm and dry.      Capillary Refill: Capillary refill takes less than 2 seconds.   Neurological:      Mental Status: He is alert and oriented to person, place, and time.   Psychiatric:         Mood and Affect: Mood normal.         Behavior: Behavior normal.      Result Review :          Narrative & Impression   PROCEDURE:  CT HEAD WO CONTRAST     COMPARISON:  None  INDICATIONS:  Hx prostate cancer. Headaches/ Sinus drainage since december     PROTOCOL:     Standard imaging protocol performed                 RADIATION:      DLP: 932mGy*cm               MA and/or KV was adjusted to minimize radiation dose.                     TECHNIQUE:    After obtaining the patient's consent, CT images were obtained without non-ionic   intravenous contrast material.      FINDINGS:          There is mild generalized parenchymal volume loss.  There is no evidence of acute infarct or   hemorrhage.  No abnormal extra-axial fluid collections are identified.  There is no mass effect or   hydrocephalus.  Globes and orbits are within normal limits.     There is mucosal thickening within the bilateral maxillary and ethmoid sinuses.  Mastoid air cells   appear clear.  No acute skull fracture.    IMPRESSION:                 1. No acute intracranial abnormality  2. Chronic ethmoid and maxillary sinusitis.       LORENA CHINCHILLA MD         Electronically Signed and Approved By: LORENA CHINCHILLA MD on 5/30/2023 at 13:39               Assessment and Plan   Diagnoses and all orders for this  visit:    1. Chronic nonintractable headache, unspecified headache type (Primary)  Comments:  CT shows chronic sinusitis, will attempt to get sooner appointment with ENT for further treatment recommendations,  consider sleep study if persist    2. Exertional chest pain  Comments:  Pounding chest discomfort on exertion. Referral placed to cardiology for further workup.  Orders:  -     Ambulatory Referral to Cardiology (Soonest available appointment for stress test.)    3. Shortness of breath  -     Ambulatory Referral to Cardiology (Soonest available appointment for stress test.)    4. Essential hypertension  Assessment & Plan:  Hypertension is improving with treatment.  Continue current treatment regimen.  Dietary sodium restriction.  Weight loss.  Regular aerobic exercise.  Continue current medications.  Ambulatory blood pressure monitoring.  Blood pressure will be reassessed in 3 months.        Go to ER for worsening if headache worse headache you have ever had or ABIEL workman SOA.          Seen with NP jewel Johnson.          Follow Up   Return in 13 weeks (on 9/6/2023), or if symptoms worsen or fail to improve, for Next scheduled follow up with PCP.  Patient was given instructions and counseling regarding his condition or for health maintenance advice. Please see specific information pulled into the AVS if appropriate.

## 2023-07-12 PROBLEM — H69.83 DYSFUNCTION OF BOTH EUSTACHIAN TUBES: Status: ACTIVE | Noted: 2023-07-12

## 2023-07-12 PROBLEM — H90.3 SENSORINEURAL HEARING LOSS (SNHL) OF BOTH EARS: Status: ACTIVE | Noted: 2023-07-12

## 2023-07-12 PROBLEM — H69.93 DYSFUNCTION OF BOTH EUSTACHIAN TUBES: Status: ACTIVE | Noted: 2023-07-12

## 2023-07-12 PROBLEM — J32.4 CHRONIC PANSINUSITIS: Status: ACTIVE | Noted: 2023-07-12

## 2023-07-26 ENCOUNTER — LAB (OUTPATIENT)
Dept: LAB | Facility: HOSPITAL | Age: 73
End: 2023-07-26
Payer: MEDICARE

## 2023-07-26 DIAGNOSIS — C61 PROSTATE CANCER: ICD-10-CM

## 2023-07-26 LAB — PSA SERPL-MCNC: <0.014 NG/ML (ref 0–4)

## 2023-07-26 PROCEDURE — 36415 COLL VENOUS BLD VENIPUNCTURE: CPT

## 2023-07-26 PROCEDURE — 84153 ASSAY OF PSA TOTAL: CPT

## 2023-08-09 ENCOUNTER — TELEPHONE (OUTPATIENT)
Dept: OTOLARYNGOLOGY | Facility: CLINIC | Age: 73
End: 2023-08-09

## 2023-08-09 NOTE — TELEPHONE ENCOUNTER
Provider: DR SWEENEY  Caller: BERE  Relationship to Patient: PROVIDER OFFICE    Phone Number: [phone]  Reason for Call: PT WAS REFERRED TO DR LIU'S OFFICE AND HAS AN APPT TODAY AT 11:30. THEY HAVE NOT RECEIVED THE REFERRAL YET. PLEASE FAX THAT [phone]    OFFICE IS AWARE THAT BRODIE IS IN TRANSIT FROM [facility] TO [place] AND SHOULD BE THERE W/IN THE NEXT 30 MINUTES.

## 2023-08-15 ENCOUNTER — OFFICE VISIT (OUTPATIENT)
Dept: UROLOGY | Facility: CLINIC | Age: 73
End: 2023-08-15
Payer: MEDICARE

## 2023-08-15 VITALS — WEIGHT: 241.6 LBS | BODY MASS INDEX: 30.04 KG/M2 | HEIGHT: 75 IN | RESPIRATION RATE: 16 BRPM

## 2023-08-15 DIAGNOSIS — C61 PROSTATE CANCER: Primary | ICD-10-CM

## 2023-08-15 DIAGNOSIS — N48.0 BALANITIS XEROTICA OBLITERANS: ICD-10-CM

## 2023-08-15 PROCEDURE — 1160F RVW MEDS BY RX/DR IN RCRD: CPT | Performed by: UROLOGY

## 2023-08-15 PROCEDURE — 99213 OFFICE O/P EST LOW 20 MIN: CPT | Performed by: UROLOGY

## 2023-08-15 PROCEDURE — 1159F MED LIST DOCD IN RCRD: CPT | Performed by: UROLOGY

## 2023-08-15 NOTE — PROGRESS NOTES
"    UROLOGY OFFICE follow-up NOTE    Subjective   HPI  Ramirez Baltazar is a 72 y.o. male.  Presents for follow-up of prostate cancer with PSA prior.  Prostate cancer treated with RALP, 12/12/2022.   The patient presents today for a follow-up. He is accompanied by his wife today.     The patient is feeling tired; generally becomes more fatigued since surgery; wife states he has not completely regained his strength back.  She states that he works for 15 minutes and then he gets very tired and has to sit down.  Considering returning to pain management.     He states that his urination is steady and slowly improving.  Doing pelvic floor exercises.  The patient's wife states that he is doing a lot better than he thinks he is.      Update 8/15/2023: Patient presents for follow-up of prostate cancer with PSA prior.  Patient states overall he thinks he is doing \"excellent.\"  Has small amount of leakage 2-3x daily, he is pleased with this.  History of ED; not currently bothered to pursue possible options of treatment.  Denies acute episodes of balanitis since last visit.  No complaints today.    ____________  PSA  7/26/2023: <0.014  4/27/2022: <0.014  1/26/2023: <0.014  8/26/2022: 7.39  8/25/2021: 1.01  8/3/2020: 0.95     RALP path, 12/12/2022: pT3aN0 ([place] 4+3=7) acinar adenocarcinoma; (+ JOSE LUIS, + bladder neck invasion, + margin left bladder neck)     TRUS prostate biopsy 9/8/2022: Aless[place] 4+4= 8 adenocarcinoma of the prostate (8/18 cores)         Results for orders placed or performed in visit on 07/26/23   PSA DIAGNOSTIC    Specimen: Blood   Result Value Ref Range    PSA <0.014 0.000 - 4.000 ng/mL       Review of systems  A review of systems was performed, and positive findings are noted in the HPI.    Objective     Vital Signs:   Resp 16   Ht 190.5 cm (75\")   Wt 110 kg (241 lb 9.6 oz)   BMI 30.20 kg/mý       Physical exam  No acute distress, well-nourished  Awake alert and oriented  Mood normal; affect " normal    Problem List:  Patient Active Problem List   Diagnosis    Chronic midline low back pain with bilateral sciatica (OTC meds only at this time)     Essential hypertension    Dysthymic disorder    Elevated cholesterol    Medicare annual wellness visit, subsequent    History of abdominal aortic aneurysm repair    History of prostate cancer    GERD without esophagitis    Elevated blood sugar level    Chronic pansinusitis    Sensorineural hearing loss (SNHL) of both ears    Dysfunction of both eustachian tubes       Assessment & Plan   Diagnoses and all orders for this visit:    1. Prostate cancer (Primary)  -     PSA DIAGNOSTIC; Future    2. Balanitis xerotica obliterans           RALP path, 12/12/2022: pT3aN0 ([place] 4+3=7) acinar adenocarcinoma; (+ JOSE LUIS, + bladder neck invasion, + margin left bladder neck)    PSA remains undetectable; plan to recheck in 3 months  Doing well a urology standpoint     follow-up in 3 months, if stable, will space out to 6 months PSA prior.   All questions and concerns have been addressed at this time.

## 2023-08-23 ENCOUNTER — TELEPHONE (OUTPATIENT)
Dept: FAMILY MEDICINE CLINIC | Age: 73
End: 2023-08-23
Payer: MEDICARE

## 2023-08-23 ENCOUNTER — OFFICE VISIT (OUTPATIENT)
Dept: OTOLARYNGOLOGY | Facility: CLINIC | Age: 73
End: 2023-08-23
Payer: MEDICARE

## 2023-08-23 VITALS — HEIGHT: 75 IN | BODY MASS INDEX: 30.29 KG/M2 | TEMPERATURE: 97.6 F | WEIGHT: 243.6 LBS

## 2023-08-23 DIAGNOSIS — H90.3 SENSORINEURAL HEARING LOSS (SNHL) OF BOTH EARS: Primary | ICD-10-CM

## 2023-08-23 DIAGNOSIS — I71.43 INFRARENAL ABDOMINAL AORTIC ANEURYSM (AAA) WITHOUT RUPTURE: Primary | ICD-10-CM

## 2023-08-23 DIAGNOSIS — H93.13 TINNITUS OF BOTH EARS: ICD-10-CM

## 2023-08-23 PROCEDURE — 99213 OFFICE O/P EST LOW 20 MIN: CPT | Performed by: OTOLARYNGOLOGY

## 2023-08-23 PROCEDURE — 1159F MED LIST DOCD IN RCRD: CPT | Performed by: OTOLARYNGOLOGY

## 2023-08-23 PROCEDURE — 1160F RVW MEDS BY RX/DR IN RCRD: CPT | Performed by: OTOLARYNGOLOGY

## 2023-08-23 NOTE — TELEPHONE ENCOUNTER
Caller: Ramirez Baltazar    Relationship: Self    Best call back number: [phone]     What is the medical concern/diagnosis: SEVERE HEADACHES    What specialty or service is being requested: NEUROLOGY    What is the provider, practice or medical service name: WHOEVER IS RECOMMENDED      Any additional details: RECOMMENDED REFERRAL FROM ENT

## 2023-08-24 DIAGNOSIS — G43.909 MIGRAINE WITHOUT STATUS MIGRAINOSUS, NOT INTRACTABLE, UNSPECIFIED MIGRAINE TYPE: Primary | ICD-10-CM

## 2023-09-06 ENCOUNTER — OFFICE VISIT (OUTPATIENT)
Dept: FAMILY MEDICINE CLINIC | Age: 73
End: 2023-09-06
Payer: MEDICARE

## 2023-09-06 VITALS
BODY MASS INDEX: 30.06 KG/M2 | SYSTOLIC BLOOD PRESSURE: 131 MMHG | DIASTOLIC BLOOD PRESSURE: 78 MMHG | WEIGHT: 241.8 LBS | HEIGHT: 75 IN | HEART RATE: 72 BPM

## 2023-09-06 DIAGNOSIS — E78.00 ELEVATED CHOLESTEROL: Primary | ICD-10-CM

## 2023-09-06 DIAGNOSIS — R73.9 ELEVATED BLOOD SUGAR LEVEL: ICD-10-CM

## 2023-09-06 DIAGNOSIS — K21.9 GERD WITHOUT ESOPHAGITIS: ICD-10-CM

## 2023-09-06 DIAGNOSIS — G89.29 CHRONIC RIGHT-SIDED HEADACHE: ICD-10-CM

## 2023-09-06 DIAGNOSIS — M54.42 CHRONIC MIDLINE LOW BACK PAIN WITH BILATERAL SCIATICA: ICD-10-CM

## 2023-09-06 DIAGNOSIS — G89.29 CHRONIC MIDLINE LOW BACK PAIN WITH BILATERAL SCIATICA: ICD-10-CM

## 2023-09-06 DIAGNOSIS — I10 ESSENTIAL HYPERTENSION: ICD-10-CM

## 2023-09-06 DIAGNOSIS — M54.41 CHRONIC MIDLINE LOW BACK PAIN WITH BILATERAL SCIATICA: ICD-10-CM

## 2023-09-06 DIAGNOSIS — R51.9 CHRONIC RIGHT-SIDED HEADACHE: ICD-10-CM

## 2023-09-06 PROBLEM — Z00.00 MEDICARE ANNUAL WELLNESS VISIT, SUBSEQUENT: Status: RESOLVED | Noted: 2022-02-25 | Resolved: 2023-09-06

## 2023-09-06 PROBLEM — H69.93 DYSFUNCTION OF BOTH EUSTACHIAN TUBES: Status: RESOLVED | Noted: 2023-07-12 | Resolved: 2023-09-06

## 2023-09-06 PROBLEM — H69.83 DYSFUNCTION OF BOTH EUSTACHIAN TUBES: Status: RESOLVED | Noted: 2023-07-12 | Resolved: 2023-09-06

## 2023-09-06 PROCEDURE — 99214 OFFICE O/P EST MOD 30 MIN: CPT | Performed by: FAMILY MEDICINE

## 2023-09-06 PROCEDURE — 1159F MED LIST DOCD IN RCRD: CPT | Performed by: FAMILY MEDICINE

## 2023-09-06 PROCEDURE — 1160F RVW MEDS BY RX/DR IN RCRD: CPT | Performed by: FAMILY MEDICINE

## 2023-09-06 PROCEDURE — 3078F DIAST BP <80 MM HG: CPT | Performed by: FAMILY MEDICINE

## 2023-09-06 PROCEDURE — 3075F SYST BP GE 130 - 139MM HG: CPT | Performed by: FAMILY MEDICINE

## 2023-09-06 RX ORDER — GABAPENTIN 600 MG/1
600 TABLET ORAL 3 TIMES DAILY
Qty: 270 TABLET | Refills: 0 | Status: SHIPPED | OUTPATIENT
Start: 2023-09-06

## 2023-09-06 RX ORDER — OMEPRAZOLE 20 MG/1
20 CAPSULE, DELAYED RELEASE ORAL DAILY
Qty: 90 CAPSULE | Refills: 3 | Status: SHIPPED | OUTPATIENT
Start: 2023-09-06

## 2023-09-06 NOTE — ASSESSMENT & PLAN NOTE
Headaches are newly identified.  Continue current treatment regimen.  NO IMPROVEMENT, SEE NEUROLOGY AS PLANNED, WE WILL TRY TO GET HIM AN APPT SOONER

## 2023-09-06 NOTE — ASSESSMENT & PLAN NOTE
STABLE ON CURRENT MEDICATION.  RONNIE REVIEWED.  TOX SCREEN IS UP TO DATE.  THE CONTINUED USE OF A CONTROLLED SUBSTANCE IS NECESSARY FOR THIS PATIENT TO HAVE A NEAR NORMAL QUALITY OF LIFE AND WILL BE REVIEWED AT THE NEXT ROUTINE VISIT.

## 2023-09-06 NOTE — PROGRESS NOTES
Chief Complaint  Hypertension (6 months)    Subjective          Ramirez Baltazar presents to [facility]  History of Present Illness  --TOLERATING BLOOD PRESSURE MEDICATION WITHOUT APPARENT SIDE EFFECTS  --LAST LIPIDS WERE OK WITH DIETARY EFFORTS ALONE  --GERD IS WELL CONTROLLED WITH THE PPI  --RANDOM BLOOD SUGAR WAS > 130  --CONTINUES ON ROUTINE GABAPENTIN FOR CHRONIC LOW BACK PAIN, S/P SURGERY, STABLE.    --PERSISTENT RIGHT TEMPORAL HEADACHE.  NEGATIVE CT, ENT DOES NOT BELIEVE ELATED TO SINUSES.  WILL SEE NEUROLOGY BUT APPT IS NOT UNTIL NOVEMBER      No Known Allergies     Health Maintenance Due   Topic Date Due    ZOSTER VACCINE (1 of 2) Never done    COVID-19 Vaccine (4 - Moderna series) 07/04/2022    LIPID PANEL  08/26/2023        Current Outpatient Medications on File Prior to Visit   Medication Sig    Cholecalciferol (Vitamin D3) 25 MCG (1000 UT) capsule Take 1 capsule by mouth Daily.    ferrous sulfate 325 (65 FE) MG tablet Take 1 tablet by mouth Daily.    fluticasone (FLONASE) 50 MCG/ACT nasal spray 1 spray into the nostril(s) as directed by provider Daily.    lisinopril (PRINIVIL,ZESTRIL) 20 MG tablet Take 1 tablet by mouth Daily.    loratadine (Claritin) 10 MG tablet Take 1 tablet by mouth Daily.    Magnesium 250 MG tablet Take 1 tablet by mouth 2 (Two) Times a Day.    Omega-3 1000 MG capsule Take 1 capsule by mouth Daily.    POTASSIUM PO Take  by mouth.    sertraline (ZOLOFT) 50 MG tablet Take 1 tablet by mouth Daily.    Vit C-Cholecalciferol-Nabila Hip (VITAMIN C & D3/NABILA HIPS PO) Take 1 tablet by mouth Daily.    Vitamin E 180 MG (400 UNIT) capsule capsule Take 1-2 capsules by mouth Daily. REPORTS TAKES 1 A DAY ONE WEEK AND THEN TAKES 2 A DAY THE NEXT WEEK    [DISCONTINUED] gabapentin (NEURONTIN) 600 MG tablet Take 1 tablet by mouth 3 (Three) Times a Day.    [DISCONTINUED] omeprazole (priLOSEC) 20 MG capsule 1 capsule Daily.    [DISCONTINUED]  "neomycin-polymyxin-hydrocortisone (CORTISPORIN) 3.5-88897-7 otic solution Administer 3 drops into both ears 3 (Three) Times a Day.     No current facility-administered medications on file prior to visit.       Immunization History   Administered Date(s) Administered    COVID-19 (MODERNA) 1st,2nd,3rd Dose Monovalent 06/25/2021, 07/22/2021    COVID-19 (MODERNA) Monovalent Original Booster 05/09/2022    Fluzone High Dose =>65 Years ([employer] ONLY) 11/29/2017, 11/29/2017    Fluzone High-Dose 65+yrs 11/09/2021    Pneumococcal Conjugate 13-Valent (PCV13) 05/26/2016    Pneumococcal Polysaccharide (PPSV23) 06/12/2017       Review of Systems   Constitutional:  Negative for activity change, appetite change, chills, fatigue and fever.   HENT:  Negative for congestion, ear pain, rhinorrhea and sore throat.    Respiratory:  Negative for cough and shortness of breath.    Cardiovascular:  Negative for chest pain, palpitations and leg swelling.   Gastrointestinal:  Negative for abdominal pain, constipation, diarrhea, nausea and vomiting.   Musculoskeletal:  Negative for arthralgias and myalgias.   Neurological:  Positive for headache.      Objective     /78 (BP Location: Right arm, Patient Position: Sitting)   Pulse 72   Ht 190.5 cm (75\")   Wt 110 kg (241 lb 12.8 oz)   BMI 30.22 kg/m²       Physical Exam  Vitals and nursing note reviewed.   Constitutional:       General: He is not in acute distress.     Appearance: Normal appearance.   Cardiovascular:      Rate and Rhythm: Normal rate and regular rhythm.      Heart sounds: Normal heart sounds. No murmur heard.  Pulmonary:      Effort: Pulmonary effort is normal.      Breath sounds: Normal breath sounds.   Abdominal:      Palpations: Abdomen is soft.      Tenderness: There is no abdominal tenderness.   Musculoskeletal:      Cervical back: Neck supple.      Right lower leg: No edema.      Left lower leg: No edema.   Lymphadenopathy:      Cervical: No cervical adenopathy. "   Neurological:      General: No focal deficit present.      Mental Status: He is alert.      Cranial Nerves: No cranial nerve deficit.      Coordination: Coordination normal.      Gait: Gait normal.   Psychiatric:         Mood and Affect: Mood normal.         Behavior: Behavior normal.       Result Review :                             Assessment and Plan      Diagnoses and all orders for this visit:    1. Elevated cholesterol (Primary)  Assessment & Plan:  Lipid abnormalities are improving with lifestyle modifications.  Nutritional counseling was provided.  Lipids will be reassessed in 6 months.    Orders:  -     Lipid Panel; Future    2. GERD without esophagitis  Assessment & Plan:  IMPROVED WITH CURRENT TREATMENT, CONTINUE SAME, WILL REEVALUATE AT NEXT VISIT     Orders:  -     omeprazole (priLOSEC) 20 MG capsule; Take 1 capsule by mouth Daily.  Dispense: 90 capsule; Refill: 3    3. Chronic midline low back pain with bilateral sciatica (OTC meds only at this time)   Assessment & Plan:  STABLE ON CURRENT MEDICATION.  RONNIE REVIEWED.  TOX SCREEN IS UP TO DATE.  THE CONTINUED USE OF A CONTROLLED SUBSTANCE IS NECESSARY FOR THIS PATIENT TO HAVE A NEAR NORMAL QUALITY OF LIFE AND WILL BE REVIEWED AT THE NEXT ROUTINE VISIT.    Orders:  -     gabapentin (NEURONTIN) 600 MG tablet; Take 1 tablet by mouth 3 (Three) Times a Day.  Dispense: 270 tablet; Refill: 0    4. Elevated blood sugar level  -     Hemoglobin A1c; Future    5. Essential hypertension  Assessment & Plan:  Hypertension is improving with treatment.  Continue current treatment regimen.  Blood pressure will be reassessed at the next regular appointment.    Orders:  -     CBC (No Diff); Future  -     Comprehensive Metabolic Panel; Future  -     TSH Rfx On Abnormal To Free T4; Future    6. Chronic right-sided headache  Assessment & Plan:  Headaches are newly identified.  Continue current treatment regimen.  NO IMPROVEMENT, SEE NEUROLOGY AS PLANNED, WE WILL TRY TO  GET HIM AN APPT SOONER         Orders:  -     Ambulatory Referral to Neurology            Follow Up     Return in about 6 months (around 3/6/2024).    Patient was given instructions and counseling regarding his condition or for health maintenance advice. Please see specific information pulled into the AVS if appropriate.

## 2023-09-18 ENCOUNTER — TELEPHONE (OUTPATIENT)
Dept: FAMILY MEDICINE CLINIC | Age: 73
End: 2023-09-18
Payer: MEDICARE

## 2023-09-22 ENCOUNTER — LAB (OUTPATIENT)
Dept: LAB | Facility: HOSPITAL | Age: 73
End: 2023-09-22
Payer: MEDICARE

## 2023-09-22 ENCOUNTER — HOSPITAL ENCOUNTER (OUTPATIENT)
Dept: CT IMAGING | Facility: HOSPITAL | Age: 73
Discharge: HOME | End: 2023-09-22
Payer: MEDICARE

## 2023-09-22 DIAGNOSIS — I10 ESSENTIAL HYPERTENSION: ICD-10-CM

## 2023-09-22 DIAGNOSIS — R73.9 ELEVATED BLOOD SUGAR LEVEL: ICD-10-CM

## 2023-09-22 DIAGNOSIS — I71.43 INFRARENAL ABDOMINAL AORTIC ANEURYSM (AAA) WITHOUT RUPTURE: ICD-10-CM

## 2023-09-22 DIAGNOSIS — E78.00 ELEVATED CHOLESTEROL: ICD-10-CM

## 2023-09-22 LAB
CREAT BLDA-MCNC: 1 MG/DL
DEPRECATED RDW RBC AUTO: 43.4 FL (ref 37–54)
EGFRCR SERPLBLD CKD-EPI 2021: 80 ML/MIN/1.73
ERYTHROCYTE [DISTWIDTH] IN BLOOD BY AUTOMATED COUNT: 12.7 % (ref 12.3–15.4)
HBA1C MFR BLD: 6.2 % (ref 4.8–5.6)
HCT VFR BLD AUTO: 43.5 % (ref 37.5–51)
HGB BLD-MCNC: 14.4 G/DL (ref 13–17.7)
MCH RBC QN AUTO: 30.3 PG (ref 26.6–33)
MCHC RBC AUTO-ENTMCNC: 33.1 G/DL (ref 31.5–35.7)
MCV RBC AUTO: 91.4 FL (ref 79–97)
PLATELET # BLD AUTO: 166 10*3/MM3 (ref 140–450)
PMV BLD AUTO: 9.8 FL (ref 6–12)
RBC # BLD AUTO: 4.76 10*6/MM3 (ref 4.14–5.8)
WBC NRBC COR # BLD: 7.17 10*3/MM3 (ref 3.4–10.8)

## 2023-09-22 PROCEDURE — 36415 COLL VENOUS BLD VENIPUNCTURE: CPT

## 2023-09-22 PROCEDURE — 82565 ASSAY OF CREATININE: CPT

## 2023-09-22 PROCEDURE — 83036 HEMOGLOBIN GLYCOSYLATED A1C: CPT

## 2023-09-22 PROCEDURE — 85027 COMPLETE CBC AUTOMATED: CPT

## 2023-09-22 PROCEDURE — 25510000001 IOPAMIDOL PER 1 ML: Performed by: NURSE PRACTITIONER

## 2023-09-22 PROCEDURE — 74174 CTA ABD&PLVS W/CONTRAST: CPT

## 2023-09-22 RX ADMIN — IOPAMIDOL 100 ML: 755 INJECTION, SOLUTION INTRAVENOUS at 09:25

## 2023-09-25 ENCOUNTER — LAB (OUTPATIENT)
Dept: LAB | Facility: HOSPITAL | Age: 73
End: 2023-09-25
Payer: MEDICARE

## 2023-09-25 DIAGNOSIS — I10 ESSENTIAL HYPERTENSION: ICD-10-CM

## 2023-09-25 DIAGNOSIS — E78.00 ELEVATED CHOLESTEROL: ICD-10-CM

## 2023-09-25 DIAGNOSIS — R73.9 ELEVATED BLOOD SUGAR LEVEL: ICD-10-CM

## 2023-09-25 LAB
ALBUMIN SERPL-MCNC: 4.5 G/DL (ref 3.5–5.2)
ALBUMIN/GLOB SERPL: 1.7 G/DL
ALP SERPL-CCNC: 77 U/L (ref 39–117)
ALT SERPL W P-5'-P-CCNC: 13 U/L (ref 1–41)
ANION GAP SERPL CALCULATED.3IONS-SCNC: 10.3 MMOL/L (ref 5–15)
AST SERPL-CCNC: 15 U/L (ref 1–40)
BILIRUB SERPL-MCNC: 0.2 MG/DL (ref 0–1.2)
BUN SERPL-MCNC: 13 MG/DL (ref 8–23)
BUN/CREAT SERPL: 13.1 (ref 7–25)
CALCIUM SPEC-SCNC: 9.5 MG/DL (ref 8.6–10.5)
CHLORIDE SERPL-SCNC: 102 MMOL/L (ref 98–107)
CHOLEST SERPL-MCNC: 245 MG/DL (ref 0–200)
CO2 SERPL-SCNC: 25.7 MMOL/L (ref 22–29)
CREAT SERPL-MCNC: 0.99 MG/DL (ref 0.76–1.27)
EGFRCR SERPLBLD CKD-EPI 2021: 80.9 ML/MIN/1.73
GLOBULIN UR ELPH-MCNC: 2.7 GM/DL
GLUCOSE SERPL-MCNC: 144 MG/DL (ref 65–99)
HDLC SERPL-MCNC: 30 MG/DL (ref 40–60)
LDLC SERPL CALC-MCNC: 109 MG/DL (ref 0–100)
LDLC/HDLC SERPL: 3.11 {RATIO}
POTASSIUM SERPL-SCNC: 4.8 MMOL/L (ref 3.5–5.2)
PROT SERPL-MCNC: 7.2 G/DL (ref 6–8.5)
SODIUM SERPL-SCNC: 138 MMOL/L (ref 136–145)
TRIGL SERPL-MCNC: 609 MG/DL (ref 0–150)
TSH SERPL DL<=0.05 MIU/L-ACNC: 1.95 UIU/ML (ref 0.27–4.2)
VLDLC SERPL-MCNC: 106 MG/DL (ref 5–40)

## 2023-09-25 PROCEDURE — 36415 COLL VENOUS BLD VENIPUNCTURE: CPT

## 2023-09-25 PROCEDURE — 80061 LIPID PANEL: CPT

## 2023-09-25 PROCEDURE — 84443 ASSAY THYROID STIM HORMONE: CPT

## 2023-09-25 PROCEDURE — 80053 COMPREHEN METABOLIC PANEL: CPT

## 2023-09-27 DIAGNOSIS — R73.09 ELEVATED RANDOM BLOOD GLUCOSE LEVEL: ICD-10-CM

## 2023-09-27 DIAGNOSIS — I10 ESSENTIAL HYPERTENSION: Primary | ICD-10-CM

## 2023-10-02 ENCOUNTER — OFFICE VISIT (OUTPATIENT)
Dept: VASCULAR SURGERY | Facility: HOSPITAL | Age: 73
End: 2023-10-02
Payer: MEDICARE

## 2023-10-02 VITALS
HEART RATE: 74 BPM | DIASTOLIC BLOOD PRESSURE: 80 MMHG | RESPIRATION RATE: 18 BRPM | SYSTOLIC BLOOD PRESSURE: 138 MMHG | OXYGEN SATURATION: 94 % | TEMPERATURE: 97.8 F

## 2023-10-02 DIAGNOSIS — I71.43 INFRARENAL ABDOMINAL AORTIC ANEURYSM (AAA) WITHOUT RUPTURE: Primary | ICD-10-CM

## 2023-10-02 PROCEDURE — 99213 OFFICE O/P EST LOW 20 MIN: CPT | Performed by: SURGERY

## 2023-10-02 PROCEDURE — 1159F MED LIST DOCD IN RCRD: CPT | Performed by: SURGERY

## 2023-10-02 PROCEDURE — 1160F RVW MEDS BY RX/DR IN RCRD: CPT | Performed by: SURGERY

## 2023-10-02 PROCEDURE — G0463 HOSPITAL OUTPT CLINIC VISIT: HCPCS | Performed by: SURGERY

## 2023-10-02 PROCEDURE — 3075F SYST BP GE 130 - 139MM HG: CPT | Performed by: SURGERY

## 2023-10-02 PROCEDURE — 3079F DIAST BP 80-89 MM HG: CPT | Performed by: SURGERY

## 2023-10-02 NOTE — PROGRESS NOTES
[facility]   Follow up Office    Patient Name: Ramirez Baltazar  : 1950  MRN: [id number]  Primary Care Physician:  Natanael Cunha MD      Subjective   Subjective     HPI:    Ramirez Baltazar is a 72 y.o. male here for follow-up after endovascular repair of an abdominal aorta aneurysm in 2021.  Doing well.  No abdominal or back complaints.  No leg complaints.      Objective     Vitals:   Temp:  [97.8 °F (36.6 °C)] 97.8 °F (36.6 °C)  Heart Rate:  [74] 74  Resp:  [18] 18  BP: (138)/(80) 138/80    Physical Exam      General: Alert, no acute distress.  HEENT: PERRLA  Abdomen: Benign  Extremities: Symmetric.  Neuro: No gross deficits    Diagnostic studies: A CT angiogram of the abdomen and pelvis dated 2023 has been reviewed.  No significant change from the previous CT scan 1 year ago where the aneurysm had gone down from 5.1 to 4.9 cm.  Stable with no evidence of endoleak.  Satisfactory exclusion of the aneurysm.    Assessment & Plan   Assessment / Plan     Diagnoses and all orders for this visit:    1. Infrarenal abdominal aortic aneurysm (AAA) without rupture (Primary)       Assessment/Plan:   Stable repair of abdominal aorta aneurysm.  Follow-up with an aortic ultrasound in 1 year.  Sooner for any concerns or problems.        Electronically signed by Aidan Bustamante MD, 10/02/23, 9:17 AM EDT.

## 2023-10-04 ENCOUNTER — TELEPHONE (OUTPATIENT)
Dept: FAMILY MEDICINE CLINIC | Age: 73
End: 2023-10-04

## 2023-10-04 NOTE — TELEPHONE ENCOUNTER
He had a CT of the brain this past May, so it would be better if the neurologist decided if he needs another one.

## 2023-10-04 NOTE — TELEPHONE ENCOUNTER
Pt informed that Dr Cunha is out of town.  Will fwd his message to him when he returns.  Pt has an appt with neurology 10/19/23,  Ros torres.   He was hoping he could get a CT scan of his head done prior to his appt with her.

## 2023-10-04 NOTE — TELEPHONE ENCOUNTER
Caller: Ramirez Baltazar    Relationship: Self    Best call back number: [phone]     What orders are you requesting (i.e. lab or imaging): CT SCAN FOR HEAD    In what timeframe would the patient need to come in: ASAP    Additional notes: STATED HE IS HAVING HEADACHES AND NECK PAIN AND WOULD LIKE TO SEE IF THIS SHOWS ANYTHING. HE WOULD LIKE A CALL BACK FROM SARAH.

## 2023-10-11 ENCOUNTER — TELEPHONE (OUTPATIENT)
Dept: FAMILY MEDICINE CLINIC | Age: 73
End: 2023-10-11
Payer: MEDICARE

## 2023-10-11 DIAGNOSIS — G89.29 CHRONIC MIDLINE LOW BACK PAIN WITH BILATERAL SCIATICA: ICD-10-CM

## 2023-10-11 DIAGNOSIS — M54.41 CHRONIC MIDLINE LOW BACK PAIN WITH BILATERAL SCIATICA: ICD-10-CM

## 2023-10-11 DIAGNOSIS — M54.42 CHRONIC MIDLINE LOW BACK PAIN WITH BILATERAL SCIATICA: ICD-10-CM

## 2023-10-11 PROCEDURE — 80305 DRUG TEST PRSMV DIR OPT OBS: CPT | Performed by: FAMILY MEDICINE

## 2023-10-11 NOTE — TELEPHONE ENCOUNTER
Caller: [facility] - [place], [place] - [address] - [phone]  - [phone] FX    Relationship: Pharmacy    Best call back number: [phone]    What medication are you requestin MG OF GABAPENTIN    If a prescription is needed, what is your preferred pharmacy and phone number: [facility] - [place], [place] - [address] - [phone]  - [phone] FX     [facility] [address] [address] [place] [facility] - [phone] [facility] [phone]  [phone]  - FOR TWO WEEK SUPPLY    Additional notes:  PATIENT IS CURRENTLY OUT OF THE MEDICATION AND CANNOT AFFORD THE COPAY FOR  MG, AND WOULD LIKE TO HAVE 2 WEEKS SUPPLY TO BE SENT TO [facility] WHILE WAITING FOR THE SCRIPT FROM [facility]AN[facility] TO BE DELIVERED TO HIS HOME.

## 2023-10-12 RX ORDER — GABAPENTIN 600 MG/1
600 TABLET ORAL 3 TIMES DAILY
Qty: 270 TABLET | Refills: 0 | Status: CANCELLED | OUTPATIENT
Start: 2023-10-12

## 2023-10-12 NOTE — TELEPHONE ENCOUNTER
Pt is asking for a reduced dose of Gabapentin from 600mg tid to 300mg qid.  He said the 600mg is at a higher copay.  He doesn't need any sent to [facility].  Requesting a 90 day supply be sent to [facility].   Gabapentin 600mg dose last sent: 09/06/23, #270  No drug screen on file.

## 2023-10-13 NOTE — TELEPHONE ENCOUNTER
Pt said he stopped going to them going to [facility] pain management about 8 months ago.  He said they told him he missed an appt and he knows he didn't because he would write down his appts.  He said he decided to stop taking the hydrocodone and stop seeing them.   Dr Cunha has been filling his gabapentin every since then.

## 2023-10-17 ENCOUNTER — CLINICAL SUPPORT (OUTPATIENT)
Dept: FAMILY MEDICINE CLINIC | Age: 73
End: 2023-10-17
Payer: MEDICARE

## 2023-10-17 LAB
AMPHET+METHAMPHET UR QL: NEGATIVE
AMPHETAMINES UR QL: NEGATIVE
BARBITURATES UR QL SCN: NEGATIVE
BENZODIAZ UR QL SCN: NEGATIVE
BUPRENORPHINE SERPL-MCNC: NEGATIVE NG/ML
CANNABINOIDS SERPL QL: NEGATIVE
COCAINE UR QL: NEGATIVE
EXPIRATION DATE: NORMAL
Lab: NORMAL
MDMA UR QL SCN: NEGATIVE
METHADONE UR QL SCN: NEGATIVE
OPIATES UR QL: NEGATIVE
OXYCODONE UR QL SCN: NEGATIVE
PCP UR QL SCN: NEGATIVE

## 2023-10-17 NOTE — TELEPHONE ENCOUNTER
Patient has not been taking gabapentin for awhile and would like to restart it. Patient inf of tox and contract. Will be by today to do that.

## 2023-10-18 RX ORDER — GABAPENTIN 400 MG/1
400 CAPSULE ORAL 4 TIMES DAILY
Qty: 360 CAPSULE | Refills: 0 | Status: SHIPPED | OUTPATIENT
Start: 2023-10-18

## 2023-10-19 ENCOUNTER — OFFICE VISIT (OUTPATIENT)
Dept: NEUROLOGY | Facility: CLINIC | Age: 73
End: 2023-10-19
Payer: MEDICARE

## 2023-10-19 VITALS
WEIGHT: 246 LBS | BODY MASS INDEX: 30.59 KG/M2 | HEART RATE: 66 BPM | SYSTOLIC BLOOD PRESSURE: 128 MMHG | OXYGEN SATURATION: 98 % | DIASTOLIC BLOOD PRESSURE: 70 MMHG | HEIGHT: 75 IN

## 2023-10-19 DIAGNOSIS — M54.2 CHRONIC NECK PAIN: Primary | ICD-10-CM

## 2023-10-19 DIAGNOSIS — G89.29 CHRONIC NECK PAIN: Primary | ICD-10-CM

## 2023-10-19 DIAGNOSIS — G43.009 MIGRAINE WITHOUT AURA AND WITHOUT STATUS MIGRAINOSUS, NOT INTRACTABLE: ICD-10-CM

## 2023-10-19 DIAGNOSIS — M62.838 MUSCLE SPASM: ICD-10-CM

## 2023-10-19 DIAGNOSIS — R51.9 CHRONIC DAILY HEADACHE: ICD-10-CM

## 2023-10-19 RX ORDER — BACLOFEN 10 MG/1
10 TABLET ORAL NIGHTLY
Qty: 30 TABLET | Refills: 2 | Status: SHIPPED | OUTPATIENT
Start: 2023-10-19 | End: 2024-01-17

## 2023-10-19 NOTE — PROGRESS NOTES
[facility] NEUROLOGY         Date of Visit: 10/19/2023    Name: Ramirez Baltazar    :  1950    PCP: Natanael Cunha MD    Visit Type: an initial evaluation         Subjective     Patient ID: Ramirez is a 72 y.o. male.         History of Present Illness  I had the pleasure of seeing your patient for the first time today.  As you may know he is a 72-year-old male here today for initial evaluation for complaints of headaches.  He was referred by his primary care physician.    History:    Patient does have a history of hypertension, hyperlipidemia, abdominal aortic aneurysm with stent placement, previous lumbar spinal surgery.    Patient states that he began experiencing symptoms of frequent headaches starting in 2022.  Patient states that the headaches are typically posterior in nature described as a pressure or aching like pain and then can radiate to the top of the head.  He rates them on a daily basis is approximately a 2-3 out of 10 on the pain scale however experiences at least 5 or more headache days per month that are more severe rated as a 10 out of 10 and associated with sensitivity to light and sound.  He denies any other symptoms such as dizziness, vision changes, lightheadedness with the headache.  He does not notice any triggers for the symptoms.    Patient does have a history of previous migraine headaches however they were infrequent in nature only occurring approximately 1 or 2 times per year.  Patient states that he has had multiple injuries to the neck and known arthritic changes of the neck based on an x-ray completed in .  He has never had any interventions for the cervical spine.    Patient did have a CT of the head done in May 2023 showing no significant abnormalities.  He currently has been using equate headache relief over-the-counter for treatment of headaches.  He states this does dull the pain does not ever abort headache symptoms.  He is not  taking any migraine medications in the past.  He does take gabapentin 400 mg 4 times daily right now for neuropathic pain symptoms related to his back.  He is not currently on any muscle relaxers however does put report a lot of muscle tightness and stiffness in the neck as well as chronic neck pain.  He denies any new numbness tingling or weakness.  No other new neurological complaints at today's visit.  The headache questionnaire dated 10/19/2023 for additional information.      The following portions of the patient's history were reviewed and updated as appropriate: allergies, current medications, past family history, past medical history, past social history, past surgical history, and problem list.  RONNIE reviewed by Ros Islas APRN              Review of Systems   Constitutional:  Negative for activity change, appetite change, fatigue and unexpected weight change.   HENT:  Positive for hearing loss and tinnitus. Negative for trouble swallowing.    Eyes:  Positive for photophobia. Negative for pain.   Respiratory:  Negative for chest tightness and shortness of breath.    Cardiovascular:  Negative for palpitations.   Gastrointestinal:  Negative for nausea and vomiting.   Musculoskeletal:  Positive for back pain, gait problem and neck pain.   Neurological:  Positive for headaches. Negative for dizziness, syncope, facial asymmetry, speech difficulty, weakness, light-headedness and numbness.   Psychiatric/Behavioral:  Negative for confusion and sleep disturbance.             Current Medications:    Current Outpatient Medications   Medication Instructions    baclofen (LIORESAL) 10 mg, Oral, Nightly    ferrous sulfate 325 mg, Oral, Daily    fluticasone (FLONASE) 50 MCG/ACT nasal spray 1 spray, Nasal, Daily    gabapentin (NEURONTIN) 400 mg, Oral, 4 Times Daily    lisinopril (PRINIVIL,ZESTRIL) 20 mg, Oral, Daily    loratadine (CLARITIN) 10 mg, Oral, Daily    Magnesium 250 MG tablet 1 tablet, Oral, 2 Times Daily  "   Omega-3 1000 MG capsule 1 capsule, Oral, Daily    omeprazole (PRILOSEC) 20 mg, Oral, Daily    POTASSIUM PO Oral    sertraline (ZOLOFT) 50 mg, Oral, Daily    ubrogepant (UBRELVY) 100 mg, Oral, As Needed    Vit C-Cholecalciferol-Nabila Hip (VITAMIN C & D3/NABILA HIPS PO) 1 tablet, Oral, Daily    Vitamin D3 1,000 Units, Oral, Daily    Vitamin E 180 MG (400 UNIT) capsule capsule 1-2 capsules, Oral, Daily, REPORTS TAKES 1 A DAY ONE WEEK AND THEN TAKES 2 A DAY THE NEXT WEEK          /70   Pulse 66   Ht 190.5 cm (75\")   Wt 112 kg (246 lb)   SpO2 98%   BMI 30.75 kg/m²                Objective     Neurological Exam  Mental Status  Awake, alert and oriented to person, place and time. Speech is normal. Language is fluent with no aphasia.    Cranial Nerves  CN II: Visual fields full to confrontation.  CN III, IV, VI: Extraocular movements intact bilaterally. Normal lids and orbits bilaterally. Pupils equal round and reactive to light bilaterally.  CN V: Facial sensation is normal.  CN VII: Full and symmetric facial movement.  CN IX, X: Palate elevates symmetrically  CN XI: Shoulder shrug strength is normal.  CN XII: Tongue midline without atrophy or fasciculations.    Motor  Normal muscle bulk throughout. Normal muscle tone. No abnormal involuntary movements. Strength is 5/5 throughout all four extremities.    Sensory  Sensation is intact to light touch, pinprick, vibration and proprioception in all four extremities.    Reflexes  Deep tendon reflexes are 2+ and symmetric in all four extremities.    Coordination    Finger-to-nose, rapid alternating movements and heel-to-shin normal bilaterally without dysmetria.    Gait  Normal casual, toe, heel and tandem gait.      Physical Exam  Constitutional:       Appearance: Normal appearance. He is normal weight.   HENT:      Head: Normocephalic.   Eyes:      General: Lids are normal.      Extraocular Movements: Extraocular movements intact.      Pupils: Pupils are equal, " round, and reactive to light.   Neck:      Comments: Muscle spasm and tightness bilaterally  Pulmonary:      Effort: Pulmonary effort is normal.   Musculoskeletal:         General: Normal range of motion.      Cervical back: Muscular tenderness present.   Skin:     General: Skin is warm.   Neurological:      Motor: Motor strength is normal.     Coordination: Coordination is intact.      Deep Tendon Reflexes: Reflexes are normal and symmetric.   Psychiatric:         Mood and Affect: Mood normal.         Speech: Speech normal.         Behavior: Behavior normal.         Thought Content: Thought content normal.                     Assessment & Plan     Diagnoses and all orders for this visit:    1. Chronic neck pain (Primary)  -     baclofen (LIORESAL) 10 MG tablet; Take 1 tablet by mouth Every Night for 90 days.  Dispense: 30 tablet; Refill: 2  -     MRI Cervical Spine Without Contrast; Future    2. Muscle spasm  -     baclofen (LIORESAL) 10 MG tablet; Take 1 tablet by mouth Every Night for 90 days.  Dispense: 30 tablet; Refill: 2  -     MRI Cervical Spine Without Contrast; Future    3. Chronic daily headache    4. Migraine without aura and without status migrainosus, not intractable  -     ubrogepant (Ubrelvy) 100 MG tablet; Take 1 tablet by mouth As Needed (Migraine. May repeat dose in 2 hours if headache not better.) for up to 2 days.  Dispense: 2 tablet; Refill: 0       At this time I would like to obtain an MRI of the cervical spine to assess for worsening changes that could be contributing to cervicogenic headache for the patient.    We will give him baclofen for muscle spasm and as he has significant muscle tightness along both sides of the cervical spine.    We will also have patient try Ubrelvy for abortive treatment of the more migrainous days.    We may consider a another preventative type medication if headaches are not improving.    Follow-up in 2 months or after imaging is complete.            Ros  Roshan TORRES    Neurology    [facility] Neurology [place]    Phone: [phone]    10/19/2023 , 10:44 EDT

## 2023-10-27 ENCOUNTER — TELEPHONE (OUTPATIENT)
Dept: FAMILY MEDICINE CLINIC | Age: 73
End: 2023-10-27

## 2023-10-27 NOTE — TELEPHONE ENCOUNTER
Caller: Kristi Baltazar    Relationship: Emergency Contact    Best call back number: [phone]    What is the best time to reach you: ANYTIME     Who are you requesting to speak with (clinical staff, provider,  specific staff member): CLINICAL          What was the call regarding:          THE PATIENT'S WIFE SAID THE PATIENT HEADACHES ARE GETTING WORSE AND IS WANTIANG TO KNOW IF PCP  [facility] CAN POSSIBLY GET HIM IN SOONER FOR A MRI   SHE SAID HE HAS AN APPOINTMENT ON 11/10/23      PLEASE CALL AND ADVISE

## 2023-10-31 PROBLEM — H93.13 TINNITUS OF BOTH EARS: Status: ACTIVE | Noted: 2023-10-31

## 2023-10-31 NOTE — PROGRESS NOTES
Patient Name: Ramirez Baltazar   Visit Date: 08/23/2023   Patient ID: [id number]  Provider: Cholo Kuo MD    Sex: male  [place] [facility]   YOB: 1950  Location Address: [address] [place] [address], [address],?[place]?[address]    Primary Care Provider Natanael Cunha MD  Location Phone: [phone]    Referring Provider: No ref. provider found        Chief Complaint  Follow-up (6 week with audio results )    Subjective    History of Present Illness  Ramirez Baltazar is a 72 y.o. male who presents to [facility] today as a consult from No ref. provider found.    He presents to the clinic today for follow-up regarding his ears and hearing.  He has had no significant changes since last time I saw him in the clinic in July.  He did undergo an audiogram which reveals moderate to severe hearing loss in the right ear and severe to profound sensorineural hearing loss in the left ear.  Word discrimination scores are 80% on the right and 60% on the left.  He has a long history of loud noise exposure without hearing protection.  I discussed the findings with him.  He has not had any significant sinus changes or purulence.  He is doing nasal saline rinses.  He is due to see a neurologist for his headache issues.    Past Medical History:   Diagnosis Date    Anxiety     Arthritis     Benign prostatic hyperplasia     Cancer     PROSTATE    Elevated PSA     Hypertension     Multiple bruises     LEFT ARM - SKIN INTACT       Past Surgical History:   Procedure Laterality Date    ABDOMINAL AORTA STENT      BACK SURGERY      CARPAL TUNNEL RELEASE Right     CATARACT EXTRACTION Right     COLONOSCOPY  2014    NORMAL    HAND SURGERY Left 08/05/2022    OTHER SURGICAL HISTORY      SCROTAL REPAIR D/T ACCIDENT    PROSTATECTOMY Bilateral 12/12/2022    Procedure: robotic assisted laparoscopic pROSTATECTOMY RADICAL WITH  bilateral pelvic LYMPH NODE DISSECTION;   Surgeon: Rebeca Parrish MD;  Location: [facility] MAIN OR;  Service: Robotics - DaVinci;  Laterality: Bilateral;    TONSILLECTOMY      TOTAL SHOULDER ARTHROPLASTY W/ DISTAL CLAVICLE EXCISION Left 06/24/2019    Procedure: TOTAL SHOULDER REVERSE ARTHROPLASTY;  Surgeon: Enoch Goldman MD;  Location: [facility] MAIN OR;  Service: Orthopedics         Current Outpatient Medications:     Cholecalciferol (Vitamin D3) 25 MCG (1000 UT) capsule, Take 1 capsule by mouth Daily., Disp: , Rfl:     ferrous sulfate 325 (65 FE) MG tablet, Take 1 tablet by mouth Daily., Disp: , Rfl:     fluticasone (FLONASE) 50 MCG/ACT nasal spray, 1 spray into the nostril(s) as directed by provider Daily., Disp: 18 g, Rfl: 3    lisinopril (PRINIVIL,ZESTRIL) 20 MG tablet, Take 1 tablet by mouth Daily., Disp: 90 tablet, Rfl: 3    loratadine (Claritin) 10 MG tablet, Take 1 tablet by mouth Daily., Disp: 90 tablet, Rfl: 3    Magnesium 250 MG tablet, Take 1 tablet by mouth 2 (Two) Times a Day., Disp: , Rfl:     Omega-3 1000 MG capsule, Take 1 capsule by mouth Daily., Disp: , Rfl:     POTASSIUM PO, Take  by mouth., Disp: , Rfl:     sertraline (ZOLOFT) 50 MG tablet, Take 1 tablet by mouth Daily., Disp: 90 tablet, Rfl: 3    Vit C-Cholecalciferol-Nabila Hip (VITAMIN C & D3/NABILA HIPS PO), Take 1 tablet by mouth Daily., Disp: , Rfl:     Vitamin E 180 MG (400 UNIT) capsule capsule, Take 1-2 capsules by mouth Daily. REPORTS TAKES 1 A DAY ONE WEEK AND THEN TAKES 2 A DAY THE NEXT WEEK, Disp: , Rfl:     baclofen (LIORESAL) 10 MG tablet, Take 1 tablet by mouth Every Night for 90 days., Disp: 30 tablet, Rfl: 2    gabapentin (NEURONTIN) 400 MG capsule, Take 1 capsule by mouth 4 (Four) Times a Day., Disp: 360 capsule, Rfl: 0    omeprazole (priLOSEC) 20 MG capsule, Take 1 capsule by mouth Daily., Disp: 90 capsule, Rfl: 3     No Known Allergies    Family History   Problem Relation Age of Onset    Diabetes Mother     Malig Hyperthermia Neg Hx         Social History     Social  "History Narrative    Not on file       Objective     Vital Signs:   Temp 97.6 °F (36.4 °C) (Tympanic)   Ht 190.5 cm (75\")   Wt 110 kg (243 lb 9.6 oz)   BMI 30.45 kg/m²       Physical Exam    Constitutional   Appearance  : well developed, well-nourished, alert and in no acute distress, voice clear and strong    Head  Inspection  : no deformities or lesions  Face  Inspection  : No facial lesions; House-Brackmann I/VI bilaterally  Palpation  : No TMJ crepitus nor  muscle tenderness bilaterally    Eyes  Vision  Visual Fields  : Extraocular movements are intact. No spontaneous or gaze-induced nystagmus.  Conjunctivae  : clear  Sclerae  : clear  Pupils and Irises  : pupils equal, round, and reactive to light.     Ears, Nose, Mouth and Throat    Ears    External Ears  : appearance within normal limits, no lesions present  Otoscopic Examination  : Tympanic membrane appearance with mild myringosclerosis, but otherwise within normal limits bilaterally without perforations, well-aerated middle ears  Hearing  : intact to conversational voice both ears  Tunning fork testing:     :    Nose    External Nose  : appearance normal  Intranasal Exam  : mucosa within normal limits, vestibules normal, no intranasal lesions present, septum midline, sinuses non tender to percussion  Oral Cavity    Oral Mucosa  : oral mucosa normal without pallor or cyanosis  Lips  : lip appearance normal  Teeth  : normal dentition for age  Gums  : gums pink, non-swollen, no bleeding present  Tongue  : tongue appearance normal; normal mobility  Palate  : hard palate normal, soft palate appearance normal with symmetric mobility    Throat    Oropharynx  : no inflammation or lesions present, tonsils surgically absent  Hypopharynx  : appearance within normal limits, superior epiglottis within normal limits  Larynx  : appearance within normal limits, vocal cords within normal limits, no lesions present    Neck  Inspection/Palpation  : normal " appearance, no masses or tenderness, trachea midline; thyroid size normal, nontender, no nodules or masses present on palpation    Respiratory  Respiratory Effort  : breathing unlabored  Inspection of Chest  : normal appearance, no retractions    Cardiovascular  Heart  : regular rate and rhythm    Lymphatic  Neck  : no lymphadenopathy present  Supraclavicular Nodes  : no lymphadenopathy present  Preauricular Nodes  : no lymphadenopathy present    Skin and Subcutaneous Tissue  General Inspection  : Regarding face and neck - there are no rashes present, no lesions present, and no areas of discoloration    Neurologic  Cranial Nerves  : cranial nerves II-XII are grossly intact bilaterally  Gait and Station  : normal gait, able to stand without diffculty    Psychiatric  Judgement and Insight  : judgment and insight intact  Mood and Affect  : mood normal, affect appropriate              Assessment and Plan    Diagnoses and all orders for this visit:    1. Sensorineural hearing loss (SNHL) of both ears (Primary)    2. Tinnitus of both ears    Examination today revealed stable ear exam with mild myringosclerosis but no evidence of infection or fluid.  Audiogram results were discussed with the patient and I did recommend hearing aid trial for him.  We discussed background noise distraction and trial of melatonin for her ear ringing and discussed relation between sensorineural hearing loss and ringing.  I will be glad to see him back in the clinic on an as-needed basis should there be any further issues.    Follow Up   No follow-ups on file.  Patient was given instructions and counseling regarding his condition or for health maintenance advice. Please see specific information pulled into the AVS if appropriate.

## 2023-11-07 ENCOUNTER — LAB (OUTPATIENT)
Dept: LAB | Facility: HOSPITAL | Age: 73
End: 2023-11-07
Payer: MEDICARE

## 2023-11-07 DIAGNOSIS — C61 PROSTATE CANCER: ICD-10-CM

## 2023-11-07 LAB — PSA SERPL-MCNC: <0.014 NG/ML (ref 0–4)

## 2023-11-07 PROCEDURE — 84153 ASSAY OF PSA TOTAL: CPT

## 2023-11-07 PROCEDURE — 36415 COLL VENOUS BLD VENIPUNCTURE: CPT

## 2023-11-10 ENCOUNTER — HOSPITAL ENCOUNTER (OUTPATIENT)
Dept: MRI IMAGING | Facility: HOSPITAL | Age: 73
Discharge: HOME | End: 2023-11-10
Admitting: NURSE PRACTITIONER
Payer: MEDICARE

## 2023-11-10 DIAGNOSIS — G89.29 CHRONIC NECK PAIN: ICD-10-CM

## 2023-11-10 DIAGNOSIS — M54.2 CHRONIC NECK PAIN: ICD-10-CM

## 2023-11-10 DIAGNOSIS — M62.838 MUSCLE SPASM: ICD-10-CM

## 2023-11-10 PROCEDURE — 72141 MRI NECK SPINE W/O DYE: CPT

## 2023-11-14 ENCOUNTER — OFFICE VISIT (OUTPATIENT)
Dept: UROLOGY | Facility: CLINIC | Age: 73
End: 2023-11-14
Payer: MEDICARE

## 2023-11-14 VITALS
BODY MASS INDEX: 30.99 KG/M2 | RESPIRATION RATE: 16 BRPM | HEIGHT: 75 IN | DIASTOLIC BLOOD PRESSURE: 83 MMHG | SYSTOLIC BLOOD PRESSURE: 153 MMHG | HEART RATE: 74 BPM | WEIGHT: 249.2 LBS

## 2023-11-14 DIAGNOSIS — N48.0 BXO (BALANITIS XEROTICA OBLITERANS): ICD-10-CM

## 2023-11-14 DIAGNOSIS — C61 PROSTATE CANCER: Primary | ICD-10-CM

## 2023-11-14 DIAGNOSIS — R39.198 URINE STREAM SPRAYING: ICD-10-CM

## 2023-11-14 RX ORDER — NYSTATIN AND TRIAMCINOLONE ACETONIDE 100000; 1 [USP'U]/G; MG/G
OINTMENT TOPICAL
Qty: 30 G | Refills: 1 | Status: SHIPPED | OUTPATIENT
Start: 2023-11-14

## 2023-11-14 NOTE — PROGRESS NOTES
"    UROLOGY OFFICE follow-up NOTE    Subjective   HPI  Ramirez Baltazar is a 73 y.o. male.  Presents for follow-up of prostate cancer with PSA prior.  Prostate cancer treated with RALP, 12/12/2022.   The patient presents today for a follow-up. He is accompanied by his wife today.     The patient is feeling tired; generally becomes more fatigued since surgery; wife states he has not completely regained his strength back.  She states that he works for 15 minutes and then he gets very tired and has to sit down.  Considering returning to pain management.     He states that his urination is steady and slowly improving.  Doing pelvic floor exercises.  The patient's wife states that he is doing a lot better than he thinks he is.      Update 8/15/2023: Patient presents for follow-up of prostate cancer with PSA prior.  Patient states overall he thinks he is doing \"excellent.\"  Has small amount of leakage 2-3x daily, he is pleased with this.  History of ED; not currently bothered to pursue possible options of treatment.  Denies acute episodes of balanitis since last visit.  No complaints today.    Update 11/14/2023: Patient presents for routine follow-up prostate cancer with PSA prior; h/o BXO. Remote h/o circ with Dr. Alcaraz.   States doing ok, has a headache. Undergoing neuro w/u.  Reports urinary spraying and some difficulty getting redundant foreskin back.   Report meatotomy at time of circ.   Has good stream just spray. Some burning and soreness.     ____________  PSA  11/7/2023: <0.014  7/26/2023: <0.014  4/27/2022: <0.014  1/26/2023: <0.014  8/26/2022: 7.39  8/25/2021: 1.01  8/3/2020: 0.95     RALP path, 12/12/2022: pT3aN0 (Alessandro 4+3=7) acinar adenocarcinoma; (+ JOSE LUIS, + bladder neck invasion, + margin left bladder neck)     TRUS prostate biopsy 9/8/2022: Alessandro 4+4= 8 adenocarcinoma of the prostate (8/18 cores)      Results for orders placed or performed in visit on 11/07/23   PSA DIAGNOSTIC    Specimen: Blood " "  Result Value Ref Range    PSA <0.014 0.000 - 4.000 ng/mL       Review of systems  A review of systems was performed, and positive findings are noted in the HPI.    Objective     Vital Signs:   /83   Pulse 74   Resp 16   Ht 190.5 cm (75\")   Wt 113 kg (249 lb 3.2 oz)   BMI 31.15 kg/m²       Physical exam  No acute distress, well-nourished  Awake alert and oriented  Mood normal; affect normal  : circ male dorsal erythematous lesion upon retraction of foreskin; patent meatus, somewhat pale glans    Problem List:  Patient Active Problem List   Diagnosis    Chronic midline low back pain with bilateral sciatica    Essential hypertension    Dysthymic disorder    Elevated cholesterol    History of abdominal aortic aneurysm repair    History of prostate cancer    GERD without esophagitis    Elevated blood sugar level    Chronic pansinusitis    Sensorineural hearing loss (SNHL) of both ears    Chronic right-sided headache    Tinnitus of both ears       Assessment & Plan   Diagnoses and all orders for this visit:    1. Prostate cancer (Primary)  -     PSA DIAGNOSTIC; Future    2. BXO (balanitis xerotica obliterans)  -     nystatin-triamcinolone (MYCOLOG) 704876-1.1 UNIT/GM-% ointment; Apply to affected area 2-3 times daily  Dispense: 30 g; Refill: 1  -     Cystoscopy; Future    3. Urine stream spraying  -     Cystoscopy; Future      RALP path, 12/12/2022: pT3aN0 (Alessandro 4+3=7) acinar adenocarcinoma; (+ JOSE LUIS, + bladder neck invasion, + margin left bladder neck)     PSA remains undetectable; plan to recheck in 6 months    Physical exam findings discussed with patient at length; could consider circumcision revision however, given the extent of his BXO, recommend attempting to proceed conservatively as revision circumcision may not provide enough benefit when considering the risks  Patient to apply cream 2-3 times daily; will reevaluate at follow-up    Anatomic assessment via cystoscopy  Could consider initiating " intermittent cath of his distal urethra with steroid cream depending on cystoscopic findings      Prescription sent to pharmacy  Schedule cystoscopy  All questions and concerns have been addressed at this time.

## 2023-12-19 ENCOUNTER — OFFICE VISIT (OUTPATIENT)
Dept: NEUROLOGY | Facility: CLINIC | Age: 73
End: 2023-12-19
Payer: MEDICARE

## 2023-12-19 VITALS
DIASTOLIC BLOOD PRESSURE: 80 MMHG | WEIGHT: 246 LBS | HEART RATE: 69 BPM | SYSTOLIC BLOOD PRESSURE: 142 MMHG | OXYGEN SATURATION: 97 % | HEIGHT: 75 IN | BODY MASS INDEX: 30.59 KG/M2

## 2023-12-19 DIAGNOSIS — M54.2 CHRONIC NECK PAIN: ICD-10-CM

## 2023-12-19 DIAGNOSIS — M62.838 MUSCLE SPASM: ICD-10-CM

## 2023-12-19 DIAGNOSIS — G89.29 CHRONIC NECK PAIN: ICD-10-CM

## 2023-12-19 RX ORDER — BACLOFEN 10 MG/1
10 TABLET ORAL NIGHTLY
Qty: 90 TABLET | Refills: 0 | Status: SHIPPED | OUTPATIENT
Start: 2023-12-19 | End: 2024-03-18

## 2023-12-19 NOTE — PROGRESS NOTES
[facility] NEUROLOGY         Date of Visit: 2023    Name: Ramirez Baltazar    :  1950    PCP: Natanael Cunha MD    Visit Type: follow-up         Subjective     Patient ID: Ramirez is a 73 y.o. male.         History of Present Illness  I had the pleasure of seeing your patient today.  As you may know he is a 72-year-old male here today for follow up for complaints of headaches.  He was referred by his primary care physician.    History:    Patient does have a history of hypertension, hyperlipidemia, abdominal aortic aneurysm with stent placement, previous lumbar spinal surgery.    Patient states that he began experiencing symptoms of frequent headaches starting in 2022.  Patient states that the headaches are typically posterior in nature described as a pressure or aching like pain and then can radiate to the top of the head.  He rates them on a daily basis is approximately a 2-3 out of 10 on the pain scale however experiences at least 5 or more headache days per month that are more severe rated as a 10 out of 10 and associated with sensitivity to light and sound.  He denies any other symptoms such as dizziness, vision changes, lightheadedness with the headache.  He does not notice any triggers for the symptoms.    Patient does have a history of previous migraine headaches however they were infrequent in nature only occurring approximately 1 or 2 times per year.  Patient states that he has had multiple injuries to the neck and known arthritic changes of the neck based on an x-ray completed in .  He has never had any interventions for the cervical spine.    Patient did have a CT of the head done in May 2023 showing no significant abnormalities.     Patient did undergo MRI of the cervical spine showing multiple levels of degnerative changes including bone spurring, disc degneration, foramenal narrowing the worst being at C4-5 and C5-6 with severe left foraminal  stenosis with nerve root impingement.     Current:    Patient states that he has not had any significant changes since his last visit.  He did try Ubrelvy for abortive treatment however this was not helpful for getting rid of headache.  He had not started on the baclofen and did not know that this was sent to the pharmacy for him.  Pharmacy never filled it.  He continues to have 30 days of headache per 30 days 2-3 of which were more migrainous in nature.  It is still posterior in the back of the head and neck.  He is still taking gabapentin 400 mg 4 times daily as prescribed by his primary care physician.  He denies any left arm numbness, tingling, weakness.  No other new neurological complaints at today's visit.      The following portions of the patient's history were reviewed and updated as appropriate: allergies, current medications, past family history, past medical history, past social history, past surgical history, and problem list.                 Review of Systems   Eyes:  Positive for photophobia. Negative for pain.   Respiratory:  Negative for chest tightness and shortness of breath.    Cardiovascular:  Negative for palpitations.   Gastrointestinal:  Negative for nausea and vomiting.   Musculoskeletal:  Positive for back pain and neck pain.   Psychiatric/Behavioral:  Negative for sleep disturbance.             Current Medications:    Current Outpatient Medications   Medication Instructions    baclofen (LIORESAL) 10 mg, Oral, Nightly    ferrous sulfate 325 mg, Oral, Daily    fluticasone (FLONASE) 50 MCG/ACT nasal spray 1 spray, Nasal, Daily    gabapentin (NEURONTIN) 400 mg, Oral, 4 Times Daily    lisinopril (PRINIVIL,ZESTRIL) 20 mg, Oral, Daily    loratadine (CLARITIN) 10 mg, Oral, Daily    Magnesium 250 MG tablet 1 tablet, Oral, 2 Times Daily    nystatin-triamcinolone (MYCOLOG) 433435-9.1 UNIT/GM-% ointment Apply to affected area 2-3 times daily    Omega-3 1000 MG capsule 1 capsule, Oral, Daily     "omeprazole (PRILOSEC) 20 mg, Oral, Daily    POTASSIUM PO Oral    sertraline (ZOLOFT) 50 mg, Oral, Daily    Vit C-Cholecalciferol-Nabila Hip (VITAMIN C & D3/NABILA HIPS PO) 1 tablet, Oral, Daily    Vitamin D3 1,000 Units, Oral, Daily    Vitamin E 180 MG (400 UNIT) capsule capsule 1-2 capsules, Oral, Daily, REPORTS TAKES 1 A DAY ONE WEEK AND THEN TAKES 2 A DAY THE NEXT WEEK          /80   Pulse 69   Ht 190.5 cm (75\")   Wt 112 kg (246 lb)   SpO2 97%   BMI 30.75 kg/m²                Objective     Neurological Exam  Mental Status  Awake, alert and oriented to person, place and time. Speech is normal. Language is fluent with no aphasia.    Cranial Nerves  CN II: Visual fields full to confrontation.  CN III, IV, VI: Extraocular movements intact bilaterally. Normal lids and orbits bilaterally. Pupils equal round and reactive to light bilaterally.  CN V: Facial sensation is normal.  CN VII: Full and symmetric facial movement.  CN IX, X: Palate elevates symmetrically  CN XI: Shoulder shrug strength is normal.  CN XII: Tongue midline without atrophy or fasciculations.    Motor  Normal muscle bulk throughout. Normal muscle tone. No abnormal involuntary movements. Strength is 5/5 throughout all four extremities.    Sensory  Sensation is intact to light touch, pinprick, vibration and proprioception in all four extremities.    Reflexes  Deep tendon reflexes are 2+ and symmetric in all four extremities.    Coordination    Finger-to-nose, rapid alternating movements and heel-to-shin normal bilaterally without dysmetria.    Gait  Normal casual, toe, heel and tandem gait.      Physical Exam  Constitutional:       Appearance: Normal appearance. He is normal weight.   HENT:      Head: Normocephalic.   Eyes:      General: Lids are normal.      Extraocular Movements: Extraocular movements intact.      Pupils: Pupils are equal, round, and reactive to light.   Neck:      Comments: Muscle spasm and tightness bilaterally  Pulmonary:    "   Effort: Pulmonary effort is normal.   Musculoskeletal:         General: Normal range of motion.      Cervical back: Muscular tenderness present.   Skin:     General: Skin is warm.   Neurological:      Motor: Motor strength is normal.     Coordination: Coordination is intact.      Deep Tendon Reflexes: Reflexes are normal and symmetric.   Psychiatric:         Mood and Affect: Mood normal.         Speech: Speech normal.         Behavior: Behavior normal.         Thought Content: Thought content normal.                     Assessment & Plan     Diagnoses and all orders for this visit:    1. Chronic neck pain  -     baclofen (LIORESAL) 10 MG tablet; Take 1 tablet by mouth Every Night for 90 days.  Dispense: 90 tablet; Refill: 0    2. Muscle spasm  -     baclofen (LIORESAL) 10 MG tablet; Take 1 tablet by mouth Every Night for 90 days.  Dispense: 90 tablet; Refill: 0       At this time we did discuss in detail results of MRI of the cervical spine.    I will represcribe baclofen for muscle spasm.  If he is not seeing symptom improvement we may consider referral to pain management.    Follow-up in 3 months or sooner if needed.            Ros TORRES    Neurology    [facility] [place]    Phone: [phone]    12/19/2023 , 09:41 EST

## 2023-12-20 ENCOUNTER — PROCEDURE VISIT (OUTPATIENT)
Dept: UROLOGY | Facility: CLINIC | Age: 73
End: 2023-12-20
Payer: MEDICARE

## 2023-12-20 VITALS
HEART RATE: 82 BPM | BODY MASS INDEX: 30.21 KG/M2 | DIASTOLIC BLOOD PRESSURE: 74 MMHG | HEIGHT: 75 IN | WEIGHT: 243 LBS | SYSTOLIC BLOOD PRESSURE: 132 MMHG

## 2023-12-20 DIAGNOSIS — N48.0 BXO (BALANITIS XEROTICA OBLITERANS): Primary | ICD-10-CM

## 2023-12-20 DIAGNOSIS — C61 PROSTATE CANCER: ICD-10-CM

## 2023-12-20 DIAGNOSIS — R39.198 URINE STREAM SPRAYING: ICD-10-CM

## 2023-12-20 NOTE — PROGRESS NOTES
Preprocedure diagnosis  Urinary stream sprain, BX, history of prostate cancer    Postprocedure diagnosis  Same as above    Procedure  Flexible Cystourethroscopy    Attending surgeon  Rebeca Parrish MD    Anesthesia  2% lidocaine jelly intraurethrally    Complications  None    Indications  73 y.o. male undergoing a flexible cystoscopy for the above mentioned indications.  Has been using steroid cream; now able to get a Q-tip under some of the scarring; has not noted significant improvement of his stream spraying.  Only leaks on occasion; changes brief every other day; satisfied with this.  Informed consent was obtained.      Findings  Severe BX with blanching of the glans; no mass or palpable abnormality; meatus patent; thickening of the preputial tissue particularly on the ventral surface at the inferior portion of the meatus.  Cystoscope passed easily at the meatus; patent urethra, absent prostate; patient able to do Kegel and coapt the lumen; cystoscopy revealed one right and left ureteral orifice in the normal anatomic position, normal bladder mucosa and no tumors, masses or stones.      Procedure  The patient was placed in supine position and prepped and draped in sterile fashion with lidocaine jelly per urethra for anesthesia.  A timeout was performed.  The 14F flexible cystoscope was lubricated and gently placed through the urethra and into the bladder.  The bladder was completely visualized.  The cystoscope was retroflexed and the bladder neck visualized. Findings were as above. The scope was withdrawn and the procedure terminated.  The patient tolerated the procedure well.        Plan:  Tolerated procedure well.  Instructions provided.  Cystoscopic findings discussed with patient include patent meatus, urethral, coaptation with Kegel, normal-appearing bladder; provided reassurance  Recommend continuing current regimen of cream and manual manipulation of preputial tissue, consideration of circumcision  revision if still bothered in 3 months.  Patient to follow-up in 3 months  All questions addressed

## 2023-12-20 NOTE — TELEPHONE ENCOUNTER
Spoke with pt regarding over due labs, pt will come in when he has an appt 9/22/23,and will get labs then, labs were ord by pcp 9/6/23-1st attempt   Admission Reconciliation is Completed  Discharge Reconciliation is Not Complete Admission Reconciliation is Not Complete  Discharge Reconciliation is Not Complete Admission Reconciliation is Not Complete  Discharge Reconciliation is Completed

## 2024-01-26 ENCOUNTER — TELEPHONE (OUTPATIENT)
Dept: FAMILY MEDICINE CLINIC | Age: 74
End: 2024-01-26
Payer: MEDICARE

## 2024-01-26 NOTE — TELEPHONE ENCOUNTER
1-26 spoke with pt regarding overdue labs ordered on 9-27-23 by Dr. Cunha. Pt states he will be in on 1-29 to complete these. 1st attempt

## 2024-01-29 ENCOUNTER — LAB (OUTPATIENT)
Dept: LAB | Facility: HOSPITAL | Age: 74
End: 2024-01-29
Payer: MEDICARE

## 2024-01-29 ENCOUNTER — OFFICE VISIT (OUTPATIENT)
Dept: FAMILY MEDICINE CLINIC | Age: 74
End: 2024-01-29
Payer: MEDICARE

## 2024-01-29 VITALS
WEIGHT: 244.6 LBS | BODY MASS INDEX: 30.41 KG/M2 | SYSTOLIC BLOOD PRESSURE: 121 MMHG | DIASTOLIC BLOOD PRESSURE: 64 MMHG | OXYGEN SATURATION: 97 % | HEIGHT: 75 IN | HEART RATE: 78 BPM | TEMPERATURE: 97.8 F

## 2024-01-29 DIAGNOSIS — R42 DIZZINESS AND GIDDINESS: ICD-10-CM

## 2024-01-29 DIAGNOSIS — E78.00 ELEVATED CHOLESTEROL: ICD-10-CM

## 2024-01-29 DIAGNOSIS — I10 ESSENTIAL HYPERTENSION: ICD-10-CM

## 2024-01-29 DIAGNOSIS — R73.09 ELEVATED RANDOM BLOOD GLUCOSE LEVEL: ICD-10-CM

## 2024-01-29 DIAGNOSIS — G43.909 MIGRAINE WITHOUT STATUS MIGRAINOSUS, NOT INTRACTABLE, UNSPECIFIED MIGRAINE TYPE: Primary | ICD-10-CM

## 2024-01-29 LAB
ALBUMIN SERPL-MCNC: 4.5 G/DL (ref 3.5–5.2)
ALBUMIN/GLOB SERPL: 1.7 G/DL
ALP SERPL-CCNC: 56 U/L (ref 39–117)
ALT SERPL W P-5'-P-CCNC: 24 U/L (ref 1–41)
ANION GAP SERPL CALCULATED.3IONS-SCNC: 7.8 MMOL/L (ref 5–15)
AST SERPL-CCNC: 22 U/L (ref 1–40)
BILIRUB SERPL-MCNC: 0.4 MG/DL (ref 0–1.2)
BUN SERPL-MCNC: 17 MG/DL (ref 8–23)
BUN/CREAT SERPL: 17 (ref 7–25)
CALCIUM SPEC-SCNC: 9.7 MG/DL (ref 8.6–10.5)
CHLORIDE SERPL-SCNC: 100 MMOL/L (ref 98–107)
CHOLEST SERPL-MCNC: 219 MG/DL (ref 0–200)
CO2 SERPL-SCNC: 29.2 MMOL/L (ref 22–29)
CREAT SERPL-MCNC: 1 MG/DL (ref 0.76–1.27)
EGFRCR SERPLBLD CKD-EPI 2021: 79.5 ML/MIN/1.73
GLOBULIN UR ELPH-MCNC: 2.7 GM/DL
GLUCOSE SERPL-MCNC: 133 MG/DL (ref 65–99)
HBA1C MFR BLD: 6.6 % (ref 4.8–5.6)
HDLC SERPL-MCNC: 27 MG/DL (ref 40–60)
LDLC SERPL CALC-MCNC: 89 MG/DL (ref 0–100)
LDLC/HDLC SERPL: 2.47 {RATIO}
POTASSIUM SERPL-SCNC: 4.4 MMOL/L (ref 3.5–5.2)
PROT SERPL-MCNC: 7.2 G/DL (ref 6–8.5)
SODIUM SERPL-SCNC: 137 MMOL/L (ref 136–145)
TRIGL SERPL-MCNC: 626 MG/DL (ref 0–150)
VLDLC SERPL-MCNC: 103 MG/DL (ref 5–40)

## 2024-01-29 PROCEDURE — 80053 COMPREHEN METABOLIC PANEL: CPT

## 2024-01-29 PROCEDURE — 99214 OFFICE O/P EST MOD 30 MIN: CPT | Performed by: NURSE PRACTITIONER

## 2024-01-29 PROCEDURE — 83036 HEMOGLOBIN GLYCOSYLATED A1C: CPT

## 2024-01-29 PROCEDURE — 1160F RVW MEDS BY RX/DR IN RCRD: CPT | Performed by: NURSE PRACTITIONER

## 2024-01-29 PROCEDURE — 1159F MED LIST DOCD IN RCRD: CPT | Performed by: NURSE PRACTITIONER

## 2024-01-29 PROCEDURE — 3074F SYST BP LT 130 MM HG: CPT | Performed by: NURSE PRACTITIONER

## 2024-01-29 PROCEDURE — 3078F DIAST BP <80 MM HG: CPT | Performed by: NURSE PRACTITIONER

## 2024-01-29 PROCEDURE — 80061 LIPID PANEL: CPT

## 2024-01-29 PROCEDURE — 36415 COLL VENOUS BLD VENIPUNCTURE: CPT

## 2024-01-29 RX ORDER — RIZATRIPTAN BENZOATE 5 MG/1
5 TABLET ORAL ONCE AS NEEDED
Qty: 9 TABLET | Refills: 0 | Status: SHIPPED | OUTPATIENT
Start: 2024-01-29

## 2024-01-29 NOTE — PROGRESS NOTES
"Chief Complaint  Headache (Sx started about a year ago )    Subjective    Patient is in today with complaints of continued headaches.  He reports that he recently saw a neurologist, who told him that he had a lot of muscle tension in his neck, and placed him on baclofen which seems to be helping.  He reports that he is taking baclofen for the past 4 nights, and he is sleeping better, and the headaches have been better.  Patient had a CT scan of his head that showed sinusitis, he was treated for that, and denies any current congestion or runny nose.  He is being monitored by his primary care provider for elevated lipid panel, and blood sugars.  He reports trying to do better and eating less sweets.        Ramirez Baltazar presents to [facility]  History of Present Illness    Objective   Vital Signs:  /64 (BP Location: Left arm, Patient Position: Sitting, Cuff Size: Adult)   Pulse 78   Temp 97.8 °F (36.6 °C) (Oral)   Ht 190.5 cm (75\")   Wt 111 kg (244 lb 9.6 oz)   SpO2 97% Comment: room air  BMI 30.57 kg/m²   Estimated body mass index is 30.57 kg/m² as calculated from the following:    Height as of this encounter: 190.5 cm (75\").    Weight as of this encounter: 111 kg (244 lb 9.6 oz).               Physical Exam  HENT:      Head: Normocephalic.   Neck:      Vascular: No carotid bruit.   Cardiovascular:      Rate and Rhythm: Normal rate and regular rhythm.   Pulmonary:      Effort: Pulmonary effort is normal.   Skin:     General: Skin is warm and dry.   Neurological:      Mental Status: He is alert and oriented to person, place, and time.   Psychiatric:         Mood and Affect: Mood normal.        Result Review :      Lipid Panel          9/25/2023    08:31   Lipid Panel   Total Cholesterol 245    Triglycerides 609    HDL Cholesterol 30    VLDL Cholesterol 106    LDL Cholesterol  109    LDL/HDL Ratio 3.11      Most Recent A1C          9/22/2023    09:22   HGBA1C Most " Recent   Hemoglobin A1C 6.20                   Assessment and Plan     Diagnoses and all orders for this visit:    1. Migraine without status migrainosus, not intractable, unspecified migraine type (Primary)  -     rizatriptan (MAXALT) 5 MG tablet; Take 1 tablet by mouth 1 (One) Time As Needed for Migraine. May repeat in 2 hours if needed  Dispense: 9 tablet; Refill: 0  -     Duplex Carotid Ultrasound CAR; Future    2. Elevated cholesterol  -     Duplex Carotid Ultrasound CAR; Future    3. Dizziness and giddiness  -     Duplex Carotid Ultrasound CAR; Future    Patient had labs drawn today per PCP, will make note of current A1c level which can contribute to headaches.  CT of head completed, sinusitis treated.  MRI of neck completed.  Patient is current with neurology.  Will order carotid ultrasound related to hyperlipidemia, former smoker history, and continued headaches.  Maxalt for as needed use.  Continue baclofen from neurology, and keep follow-up appointment to determine if these have been beneficial.  ER for the worst headache of your life.         Follow Up     Return in 11 days (on 2/9/2024), or if symptoms worsen or fail to improve, for Next scheduled follow up.  Patient was given instructions and counseling regarding his condition or for health maintenance advice. Please see specific information pulled into the AVS if appropriate.

## 2024-02-09 ENCOUNTER — OFFICE VISIT (OUTPATIENT)
Dept: FAMILY MEDICINE CLINIC | Age: 74
End: 2024-02-09
Payer: MEDICARE

## 2024-02-09 VITALS
HEART RATE: 71 BPM | WEIGHT: 249.8 LBS | BODY MASS INDEX: 31.06 KG/M2 | DIASTOLIC BLOOD PRESSURE: 72 MMHG | HEIGHT: 75 IN | SYSTOLIC BLOOD PRESSURE: 145 MMHG | TEMPERATURE: 98 F

## 2024-02-09 DIAGNOSIS — K21.9 GERD WITHOUT ESOPHAGITIS: ICD-10-CM

## 2024-02-09 DIAGNOSIS — G89.29 CHRONIC MIDLINE LOW BACK PAIN WITH BILATERAL SCIATICA: ICD-10-CM

## 2024-02-09 DIAGNOSIS — G89.29 CHRONIC RIGHT-SIDED HEADACHE: ICD-10-CM

## 2024-02-09 DIAGNOSIS — M54.41 CHRONIC MIDLINE LOW BACK PAIN WITH BILATERAL SCIATICA: ICD-10-CM

## 2024-02-09 DIAGNOSIS — M54.42 CHRONIC MIDLINE LOW BACK PAIN WITH BILATERAL SCIATICA: ICD-10-CM

## 2024-02-09 DIAGNOSIS — I10 ESSENTIAL HYPERTENSION: ICD-10-CM

## 2024-02-09 DIAGNOSIS — R51.9 CHRONIC RIGHT-SIDED HEADACHE: ICD-10-CM

## 2024-02-09 DIAGNOSIS — J32.4 CHRONIC PANSINUSITIS: ICD-10-CM

## 2024-02-09 DIAGNOSIS — E78.00 ELEVATED CHOLESTEROL: Primary | ICD-10-CM

## 2024-02-09 PROBLEM — R73.03 PREDIABETES: Status: ACTIVE | Noted: 2023-03-01

## 2024-02-09 RX ORDER — BACLOFEN 10 MG/1
10 TABLET ORAL NIGHTLY
Qty: 90 TABLET | Refills: 0 | Status: SHIPPED | OUTPATIENT
Start: 2024-02-09 | End: 2024-05-09

## 2024-02-09 RX ORDER — AMOXICILLIN 500 MG/1
500 CAPSULE ORAL 3 TIMES DAILY
Qty: 30 CAPSULE | Refills: 0 | Status: SHIPPED | OUTPATIENT
Start: 2024-02-09

## 2024-02-09 NOTE — PROGRESS NOTES
Chief Complaint  Hyperlipidemia (6 month follow up )    Subjective          Ramirez Baltazar presents to [facility]  History of Present Illness  --TOLERATING BLOOD PRESSURE MEDICATION WITHOUT APPARENT SIDE EFFECTS  --LAST HGA1C WAS 6.6 % AND LAST CHOL  WITH DIETARY EFFORTS ONLY  --CONTINUES ON ROUTINE GABAPENTIN FOR CHRONIC LOW BACK PAIN, STABLE  --PERSISTENT RIGHT POSTERIOR NECK AND OCCIPITAL PAIN, NOW SEEING NEUROLOGY WHO ADVISED A MUSCLE RELAXER, FURTHER WORKUP IS IN PROGRESS  --RECURRENT SINUS INFECTIONS, NOW PURULENT NASAL DRAINAGE FOR THE PAST FEW DAYS         No Known Allergies     Health Maintenance Due   Topic Date Due    ZOSTER VACCINE (1 of 2) Never done    RSV Vaccine - Adults (1 - 1-dose 60+ series) Never done    INFLUENZA VACCINE  08/01/2023    COVID-19 Vaccine (4 - 2023-24 season) 09/01/2023        Current Outpatient Medications on File Prior to Visit   Medication Sig    Cholecalciferol (Vitamin D3) 25 MCG (1000 UT) capsule Take 1 capsule by mouth Daily.    ferrous sulfate 325 (65 FE) MG tablet Take 1 tablet by mouth Daily.    fluticasone (FLONASE) 50 MCG/ACT nasal spray 1 spray into the nostril(s) as directed by provider Daily. (Patient taking differently: 1 spray into the nostril(s) as directed by provider As Needed.)    gabapentin (NEURONTIN) 400 MG capsule Take 1 capsule by mouth 4 (Four) Times a Day.    lisinopril (PRINIVIL,ZESTRIL) 20 MG tablet Take 1 tablet by mouth Daily.    loratadine (Claritin) 10 MG tablet Take 1 tablet by mouth Daily.    Magnesium 250 MG tablet Take 1 tablet by mouth 2 (Two) Times a Day.    nystatin-triamcinolone (MYCOLOG) 546363-4.1 UNIT/GM-% ointment Apply to affected area 2-3 times daily    Omega-3 1000 MG capsule Take 1 capsule by mouth Daily.    omeprazole (priLOSEC) 20 MG capsule Take 1 capsule by mouth Daily.    POTASSIUM PO Take  by mouth.    rizatriptan (MAXALT) 5 MG tablet Take 1 tablet by mouth 1 (One) Time As Needed for  "Migraine. May repeat in 2 hours if needed    sertraline (ZOLOFT) 50 MG tablet Take 1 tablet by mouth Daily.    Vit C-Cholecalciferol-Nabila Hip (VITAMIN C & D3/NABILA HIPS PO) Take 1 tablet by mouth Daily.    Vitamin E 180 MG (400 UNIT) capsule capsule Take 1-2 capsules by mouth Daily. REPORTS TAKES 1 A DAY ONE WEEK AND THEN TAKES 2 A DAY THE NEXT WEEK    [DISCONTINUED] baclofen (LIORESAL) 10 MG tablet Take 1 tablet by mouth Every Night for 90 days.     No current facility-administered medications on file prior to visit.       Immunization History   Administered Date(s) Administered    COVID-19 (MODERNA) 1st,2nd,3rd Dose Monovalent 06/25/2021, 07/22/2021    COVID-19 (MODERNA) Monovalent Original Booster 05/09/2022    Fluzone High Dose =>65 Years ([employer] ONLY) 11/29/2017, 11/29/2017    Fluzone High-Dose 65+yrs 11/09/2021    Pneumococcal Conjugate 13-Valent (PCV13) 05/26/2016    Pneumococcal Polysaccharide (PPSV23) 06/12/2017       Review of Systems   Constitutional:  Negative for activity change, appetite change, chills, fatigue and fever.   HENT:  Positive for congestion and rhinorrhea. Negative for ear pain and sore throat.    Respiratory:  Negative for cough and shortness of breath.    Cardiovascular:  Negative for chest pain, palpitations and leg swelling.   Gastrointestinal:  Negative for abdominal pain, constipation, diarrhea, nausea and vomiting.   Musculoskeletal:  Negative for arthralgias and myalgias.        Objective     /72 (BP Location: Right arm, Patient Position: Sitting, Cuff Size: Large Adult)   Pulse 71   Temp 98 °F (36.7 °C) (Oral)   Ht 190.5 cm (75\")   Wt 113 kg (249 lb 12.8 oz)   BMI 31.22 kg/m²       Physical Exam  Vitals and nursing note reviewed.   Constitutional:       General: He is not in acute distress.     Appearance: Normal appearance.   HENT:      Right Ear: Tympanic membrane normal.      Left Ear: Tympanic membrane normal.      Mouth/Throat:      Pharynx: Oropharynx is clear. "   Eyes:      Conjunctiva/sclera: Conjunctivae normal.   Cardiovascular:      Rate and Rhythm: Normal rate and regular rhythm.      Heart sounds: Normal heart sounds. No murmur heard.  Pulmonary:      Effort: Pulmonary effort is normal.      Breath sounds: Normal breath sounds.   Abdominal:      Palpations: Abdomen is soft.      Tenderness: There is no abdominal tenderness.   Musculoskeletal:      Cervical back: Neck supple.      Right lower leg: No edema.      Left lower leg: No edema.   Lymphadenopathy:      Cervical: No cervical adenopathy.   Neurological:      General: No focal deficit present.      Mental Status: He is alert.      Cranial Nerves: No cranial nerve deficit.      Coordination: Coordination normal.      Gait: Gait normal.   Psychiatric:         Mood and Affect: Mood normal.         Behavior: Behavior normal.         Result Review :                             Assessment and Plan      Diagnoses and all orders for this visit:    1. Elevated cholesterol (Primary)  Assessment & Plan:  Lipid abnormalities are improving with lifestyle modifications.  Nutritional counseling was provided.  Lipids will be reassessed in 6 months.      2. Essential hypertension  Assessment & Plan:  Hypertension is improving with treatment.  Continue current treatment regimen.  Blood pressure will be reassessed at the next regular appointment.      3. GERD without esophagitis  Assessment & Plan:  IMPROVED WITH CURRENT TREATMENT, CONTINUE SAME, WILL REEVALUATE AT NEXT VISIT       4. Chronic midline low back pain with bilateral sciatica  Assessment & Plan:  STABLE ON CURRENT MEDICATION.  RONNIE REVIEWED.  TOX SCREEN IS UP TO DATE.  THE CONTINUED USE OF A CONTROLLED SUBSTANCE IS NECESSARY FOR THIS PATIENT TO HAVE A NEAR NORMAL QUALITY OF LIFE AND WILL BE REVIEWED AT THE NEXT ROUTINE VISIT.      5. Chronic right-sided headache  Assessment & Plan:  Headaches are unchanged.  Continue current treatment regimen.  NOT AT GOAL, WILL  REFILL THE MUSCLE RELAXER, FURTHER PLANS PER NEUROLOGY, NOTES REVIEWED         Orders:  -     baclofen (LIORESAL) 10 MG tablet; Take 1 tablet by mouth Every Night for 90 days.  Dispense: 90 tablet; Refill: 0    6. Chronic pansinusitis  Assessment & Plan:  WILL COVER AS NOTED, FLUIDS--REST, OTC MEDS PRN.  CONSIDER SENDING BACK TO ENT     Orders:  -     amoxicillin (AMOXIL) 500 MG capsule; Take 1 capsule by mouth 3 (Three) Times a Day.  Dispense: 30 capsule; Refill: 0            Follow Up     Return in about 4 months (around 6/9/2024).    Patient was given instructions and counseling regarding his condition or for health maintenance advice. Please see specific information pulled into the AVS if appropriate.

## 2024-02-09 NOTE — ASSESSMENT & PLAN NOTE
Headaches are unchanged.  Continue current treatment regimen.  NOT AT GOAL, WILL REFILL THE MUSCLE RELAXER, FURTHER PLANS PER NEUROLOGY, NOTES REVIEWED

## 2024-02-15 ENCOUNTER — HOSPITAL ENCOUNTER (OUTPATIENT)
Dept: CARDIOLOGY | Facility: HOSPITAL | Age: 74
Discharge: HOME | End: 2024-02-15
Admitting: NURSE PRACTITIONER
Payer: MEDICARE

## 2024-02-15 DIAGNOSIS — G43.909 MIGRAINE WITHOUT STATUS MIGRAINOSUS, NOT INTRACTABLE, UNSPECIFIED MIGRAINE TYPE: ICD-10-CM

## 2024-02-15 DIAGNOSIS — R42 DIZZINESS AND GIDDINESS: ICD-10-CM

## 2024-02-15 DIAGNOSIS — E78.00 ELEVATED CHOLESTEROL: ICD-10-CM

## 2024-02-15 LAB
BH CV XLRA MEAS LEFT CAROTID BULB EDV: 27 CM/SEC
BH CV XLRA MEAS LEFT CAROTID BULB PSV: 128 CM/SEC
BH CV XLRA MEAS LEFT DIST CCA EDV: -20.3 CM/SEC
BH CV XLRA MEAS LEFT DIST CCA PSV: -86.7 CM/SEC
BH CV XLRA MEAS LEFT DIST ICA EDV: -16.5 CM/SEC
BH CV XLRA MEAS LEFT DIST ICA PSV: -181 CM/SEC
BH CV XLRA MEAS LEFT ICA/CCA RATIO: 1.51
BH CV XLRA MEAS LEFT MID ICA EDV: -31.4 CM/SEC
BH CV XLRA MEAS LEFT MID ICA PSV: -109 CM/SEC
BH CV XLRA MEAS LEFT PROX CCA EDV: 12.6 CM/SEC
BH CV XLRA MEAS LEFT PROX CCA PSV: 82.3 CM/SEC
BH CV XLRA MEAS LEFT PROX ECA EDV: -19.8 CM/SEC
BH CV XLRA MEAS LEFT PROX ECA PSV: -199.8 CM/SEC
BH CV XLRA MEAS LEFT PROX ICA EDV: -20.4 CM/SEC
BH CV XLRA MEAS LEFT PROX ICA PSV: -131 CM/SEC
BH CV XLRA MEAS LEFT VERTEBRAL A EDV: -13.3 CM/SEC
BH CV XLRA MEAS LEFT VERTEBRAL A PSV: -51.7 CM/SEC
BH CV XLRA MEAS RIGHT CAROTID BULB EDV: 17 CM/SEC
BH CV XLRA MEAS RIGHT CAROTID BULB PSV: 113 CM/SEC
BH CV XLRA MEAS RIGHT DIST CCA EDV: 19.3 CM/SEC
BH CV XLRA MEAS RIGHT DIST CCA PSV: 94.4 CM/SEC
BH CV XLRA MEAS RIGHT DIST ICA EDV: -34.9 CM/SEC
BH CV XLRA MEAS RIGHT DIST ICA PSV: -120.1 CM/SEC
BH CV XLRA MEAS RIGHT ICA/CCA RATIO: 1.53
BH CV XLRA MEAS RIGHT MID ICA EDV: -42.6 CM/SEC
BH CV XLRA MEAS RIGHT MID ICA PSV: -139.5 CM/SEC
BH CV XLRA MEAS RIGHT PROX CCA EDV: 16.2 CM/SEC
BH CV XLRA MEAS RIGHT PROX CCA PSV: 103.1 CM/SEC
BH CV XLRA MEAS RIGHT PROX ECA EDV: -42.6 CM/SEC
BH CV XLRA MEAS RIGHT PROX ECA PSV: -153 CM/SEC
BH CV XLRA MEAS RIGHT PROX ICA EDV: -16.5 CM/SEC
BH CV XLRA MEAS RIGHT PROX ICA PSV: -144 CM/SEC
BH CV XLRA MEAS RIGHT VERTEBRAL A EDV: -10.4 CM/SEC
BH CV XLRA MEAS RIGHT VERTEBRAL A PSV: -46.1 CM/SEC
LEFT ARM BP: NORMAL MMHG
RIGHT ARM BP: NORMAL MMHG

## 2024-02-15 PROCEDURE — 93880 EXTRACRANIAL BILAT STUDY: CPT

## 2024-02-27 ENCOUNTER — OFFICE VISIT (OUTPATIENT)
Dept: VASCULAR SURGERY | Facility: HOSPITAL | Age: 74
End: 2024-02-27
Payer: MEDICARE

## 2024-02-27 ENCOUNTER — TRANSCRIBE ORDERS (OUTPATIENT)
Dept: VASCULAR SURGERY | Facility: HOSPITAL | Age: 74
End: 2024-02-27

## 2024-02-27 VITALS
DIASTOLIC BLOOD PRESSURE: 70 MMHG | HEART RATE: 73 BPM | RESPIRATION RATE: 18 BRPM | TEMPERATURE: 96.9 F | OXYGEN SATURATION: 97 % | SYSTOLIC BLOOD PRESSURE: 124 MMHG

## 2024-02-27 DIAGNOSIS — G89.29 CHRONIC RIGHT-SIDED HEADACHE: Primary | ICD-10-CM

## 2024-02-27 DIAGNOSIS — R51.9 CHRONIC RIGHT-SIDED HEADACHE: Primary | ICD-10-CM

## 2024-02-27 DIAGNOSIS — I65.23 CAROTID ARTERY PLAQUE, BILATERAL: ICD-10-CM

## 2024-02-27 DIAGNOSIS — I71.43 INFRARENAL ABDOMINAL AORTIC ANEURYSM (AAA) WITHOUT RUPTURE: Primary | ICD-10-CM

## 2024-02-27 DIAGNOSIS — I71.43 INFRARENAL ABDOMINAL AORTIC ANEURYSM (AAA) WITHOUT RUPTURE: ICD-10-CM

## 2024-02-27 PROCEDURE — 3074F SYST BP LT 130 MM HG: CPT | Performed by: NURSE PRACTITIONER

## 2024-02-27 PROCEDURE — G0463 HOSPITAL OUTPT CLINIC VISIT: HCPCS | Performed by: NURSE PRACTITIONER

## 2024-02-27 PROCEDURE — 1159F MED LIST DOCD IN RCRD: CPT | Performed by: NURSE PRACTITIONER

## 2024-02-27 PROCEDURE — 1160F RVW MEDS BY RX/DR IN RCRD: CPT | Performed by: NURSE PRACTITIONER

## 2024-02-27 PROCEDURE — 3078F DIAST BP <80 MM HG: CPT | Performed by: NURSE PRACTITIONER

## 2024-02-27 PROCEDURE — 99212 OFFICE O/P EST SF 10 MIN: CPT | Performed by: NURSE PRACTITIONER

## 2024-02-27 NOTE — PROGRESS NOTES
[facility] Vascular Surgery Office Follow Up Note     Date of Encounter: 02/27/2024     MRN Number: [id number]  Name: Ramirez Baltazar  Phone Number: [phone]     Referred By: Adry Rodriguez APRN  PCP: Natanael Cunha MD    Chief Complaint:    Chief Complaint   Patient presents with    Follow-up     Patient is here as a follow up from primary care provider regarding a concern found on a carotid duplex. Patient states he has been having headaches. Patient stated he has had them for several months and went to primary care for evaluation and treatment. Carotid duplex revealed the presence of plaque but no stenosis.        Subjective      History of Present Illness:    Ramirez Baltazar is a 73 y.o. male presents as a referral from BRIAN Mendez for carotid duplex that was performed on 2/15/2024.  He is a known patient to our practice, he had a endovascular AAA repair performed by Dr. Bolton in 2021.  The duplex was ordered because he was having continued headaches. He states sometimes the headaches put him down for days.  He denies any signs/symptoms to suggest CVA, TIA or amaurosis fugax.  No history of stroke.  He is a former smoker who quit 1996.    Review of Systems:  ROS  Review of Systems   Constitutional: Negative.   HENT: Headache  Cardiovascular: Negative.    Respiratory: Negative.    Skin: Negative.    Musculoskeletal: Negative.    Gastrointestinal: Negative.    Neurological: Negative.    Psychiatric/Behavioral: Negative.      I have reviewed the following portions of the patient's history: problem list, current medications, allergies, past surgical history, past medical history, past social history, past family history, and ROS and confirm it's accurate.    Allergies:  No Known Allergies    Medications:      Current Outpatient Medications:     baclofen (LIORESAL) 10 MG tablet, Take 1 tablet by mouth Every Night for 90 days., Disp: 90 tablet, Rfl: 0    Cholecalciferol  (Vitamin D3) 25 MCG (1000 UT) capsule, Take 1 capsule by mouth Daily., Disp: , Rfl:     ferrous sulfate 325 (65 FE) MG tablet, Take 1 tablet by mouth Daily., Disp: , Rfl:     fluticasone (FLONASE) 50 MCG/ACT nasal spray, 1 spray into the nostril(s) as directed by provider Daily. (Patient taking differently: 1 spray into the nostril(s) as directed by provider As Needed.), Disp: 18 g, Rfl: 3    gabapentin (NEURONTIN) 400 MG capsule, Take 1 capsule by mouth 4 (Four) Times a Day., Disp: 360 capsule, Rfl: 0    lisinopril (PRINIVIL,ZESTRIL) 20 MG tablet, Take 1 tablet by mouth Daily., Disp: 90 tablet, Rfl: 3    loratadine (Claritin) 10 MG tablet, Take 1 tablet by mouth Daily., Disp: 90 tablet, Rfl: 3    Magnesium 250 MG tablet, Take 1 tablet by mouth 2 (Two) Times a Day., Disp: , Rfl:     nystatin-triamcinolone (MYCOLOG) 494982-2.1 UNIT/GM-% ointment, Apply to affected area 2-3 times daily, Disp: 30 g, Rfl: 1    Omega-3 1000 MG capsule, Take 1 capsule by mouth Daily., Disp: , Rfl:     omeprazole (priLOSEC) 20 MG capsule, Take 1 capsule by mouth Daily., Disp: 90 capsule, Rfl: 3    POTASSIUM PO, Take  by mouth., Disp: , Rfl:     rizatriptan (MAXALT) 5 MG tablet, Take 1 tablet by mouth 1 (One) Time As Needed for Migraine. May repeat in 2 hours if needed, Disp: 9 tablet, Rfl: 0    sertraline (ZOLOFT) 50 MG tablet, Take 1 tablet by mouth Daily., Disp: 90 tablet, Rfl: 3    Vit C-Cholecalciferol-Nabila Hip (VITAMIN C & D3/NABILA HIPS PO), Take 1 tablet by mouth Daily., Disp: , Rfl:     Vitamin E 180 MG (400 UNIT) capsule capsule, Take 1-2 capsules by mouth Daily. REPORTS TAKES 1 A DAY ONE WEEK AND THEN TAKES 2 A DAY THE NEXT WEEK, Disp: , Rfl:     amoxicillin (AMOXIL) 500 MG capsule, Take 1 capsule by mouth 3 (Three) Times a Day. (Patient not taking: Reported on 2/27/2024), Disp: 30 capsule, Rfl: 0    History:   Past Medical History:   Diagnosis Date    Anxiety     Arthritis     Benign prostatic hyperplasia     Cancer     PROSTATE     Cluster headache     Elevated PSA     Headache, tension-type     HL (hearing loss)     Hypertension     Migraine 2022    After prostrate surgery started getting headaches. Sometimes worse than others.    Multiple bruises     LEFT ARM - SKIN INTACT       Past Surgical History:   Procedure Laterality Date    ABDOMINAL AORTA STENT      BACK SURGERY      CARPAL TUNNEL RELEASE Right     CATARACT EXTRACTION Right     COLONOSCOPY      NORMAL    HAND SURGERY Left 2022    OTHER SURGICAL HISTORY      SCROTAL REPAIR D/T ACCIDENT    PROSTATECTOMY Bilateral 2022    Procedure: robotic assisted laparoscopic pROSTATECTOMY RADICAL WITH  bilateral pelvic LYMPH NODE DISSECTION;  Surgeon: Rebeca Parrish MD;  [facility] MAIN OR;  [facility];  Laterality: Bilateral;    TONSILLECTOMY      TOTAL SHOULDER ARTHROPLASTY W/ DISTAL CLAVICLE EXCISION Left 2019    Procedure: TOTAL SHOULDER REVERSE ARTHROPLASTY;  Surgeon: Enoch Goldman MD;  Location: [facility] MAIN OR;  Service: Orthopedics       Social History     Socioeconomic History    Marital status:    Tobacco Use    Smoking status: Former     Packs/day: 4.50     Years: 40.00     Additional pack years: 0.00     Total pack years: 180.00     Types: Cigarettes     Start date: 7/15/1962     Quit date: 10/24/1996     Years since quittin.3     Passive exposure: Past    Smokeless tobacco: Never    Tobacco comments:     Yes smoked most of my life   Vaping Use    Vaping Use: Never used   Substance and Sexual Activity    Alcohol use: Not Currently     Alcohol/week: 2.0 standard drinks of alcohol     Types: 2 Cans of beer per week     Comment: TWO BEERS PER MONTH WITH MEALS    Drug use: No    Sexual activity: Not Currently     Partners: Female     Birth control/protection: Vasectomy     Comment: Vasectomy  1975        Family History   Problem Relation Age of Onset    Diabetes Mother     Malig Hyperthermia Neg Hx        Objective      Physical Exam:  Vitals:    02/27/24 0848   BP: 124/70   BP Location: Left arm   Patient Position: Sitting   Cuff Size: Large Adult   Pulse: 73   Resp: 18   Temp: 96.9 °F (36.1 °C)   TempSrc: Temporal   SpO2: 97%   PainSc: 0-No pain      There is no height or weight on file to calculate BMI.    Physical Exam  Physical Exam  Constitutional:       Appearance: Normal appearance.   HENT:      Head: Normocephalic.   Cardiovascular:      Rate and Rhythm: Normal rate.      Pulses: Normal pulses.      Comments: Neck: No bruit.  Bilateral upper extremities: +2 palpable radial pulses.  Pulmonary:      Effort: Pulmonary effort is normal.   Musculoskeletal:         General: Normal range of motion.      Cervical back: Normal range of motion.   Skin:     General: Skin is warm and dry.      Capillary Refill: Capillary refill takes less than 2 seconds.      Neurological:      General: No focal deficit present.      Mental Status: Alert and oriented to person, place, and time.   Psychiatric:         Mood and Affect: Mood normal.         Behavior: Behavior normal.    Imaging/Labs:  I have reviewed the preliminary results of the carotid duplex performed today.  The duplex reveals carotid artery plaque without any significant stenosis noted.     Assessment / Plan      Assessment / Plan:  Diagnoses and all orders for this visit:    1. Chronic right-sided headache (Primary)    2. Infrarenal abdominal aortic aneurysm (AAA) without rupture    3. Carotid artery plaque, bilateral       Mr. Baltazar had a carotid duplex that revealed carotid artery plaque without significant stenosis.  We have discussed his symptoms, testing and interventions, It does not appear that his headaches are vascular related at this time, no vascular intervention recommended. I recommend he follow up with a carotid duplex in 2025 with his annual AAA surveillance.     I have answered all of their questions and they are in agreement with the plan at this time. Thank  you for allowing me to participate in your patient's care.    Patient Education: Stroke including symptoms and when to seek immediate medical attention.        Follow Up:   No follow-ups on file.   Or sooner for any further concerns or worsening sign and symptoms. If unable to reach us in the office please dial 911 or go to the nearest emergency department.      Calixto Alonso APRTOYA  [facility] Vascular Surgery

## 2024-03-04 ENCOUNTER — OFFICE VISIT (OUTPATIENT)
Dept: FAMILY MEDICINE CLINIC | Age: 74
End: 2024-03-04
Payer: MEDICARE

## 2024-03-04 VITALS
DIASTOLIC BLOOD PRESSURE: 76 MMHG | SYSTOLIC BLOOD PRESSURE: 130 MMHG | TEMPERATURE: 97.9 F | HEIGHT: 75 IN | WEIGHT: 248.4 LBS | HEART RATE: 65 BPM | BODY MASS INDEX: 30.88 KG/M2

## 2024-03-04 DIAGNOSIS — R51.9 CHRONIC RIGHT-SIDED HEADACHE: ICD-10-CM

## 2024-03-04 DIAGNOSIS — G89.29 CHRONIC RIGHT-SIDED HEADACHE: ICD-10-CM

## 2024-03-04 DIAGNOSIS — G89.29 CHRONIC MIDLINE LOW BACK PAIN WITH BILATERAL SCIATICA: ICD-10-CM

## 2024-03-04 DIAGNOSIS — I10 ESSENTIAL HYPERTENSION: ICD-10-CM

## 2024-03-04 DIAGNOSIS — Z00.00 MEDICARE ANNUAL WELLNESS VISIT, SUBSEQUENT: Primary | ICD-10-CM

## 2024-03-04 DIAGNOSIS — M54.42 CHRONIC MIDLINE LOW BACK PAIN WITH BILATERAL SCIATICA: ICD-10-CM

## 2024-03-04 DIAGNOSIS — Z79.899 LONG-TERM USE OF HIGH-RISK MEDICATION: ICD-10-CM

## 2024-03-04 DIAGNOSIS — M54.41 CHRONIC MIDLINE LOW BACK PAIN WITH BILATERAL SCIATICA: ICD-10-CM

## 2024-03-04 LAB
AMPHET+METHAMPHET UR QL: NEGATIVE
AMPHETAMINES UR QL: NEGATIVE
BARBITURATES UR QL SCN: NEGATIVE
BENZODIAZ UR QL SCN: NEGATIVE
BUPRENORPHINE SERPL-MCNC: NEGATIVE NG/ML
CANNABINOIDS SERPL QL: NEGATIVE
COCAINE UR QL: NEGATIVE
EXPIRATION DATE: NORMAL
Lab: NORMAL
MDMA UR QL SCN: NEGATIVE
METHADONE UR QL SCN: NEGATIVE
MORPHINE/OPIATES SCREEN, URINE: NEGATIVE
OXYCODONE UR QL SCN: NEGATIVE
PCP UR QL SCN: NEGATIVE

## 2024-03-04 RX ORDER — GABAPENTIN 400 MG/1
400 CAPSULE ORAL 4 TIMES DAILY
Qty: 360 CAPSULE | Refills: 0 | Status: SHIPPED | OUTPATIENT
Start: 2024-03-04

## 2024-03-04 NOTE — PROGRESS NOTES
The ABCs of the Annual Wellness Visit  Subsequent Medicare Wellness Visit    Subjective    Ramirez Baltazar is a 73 y.o. male who presents for a Subsequent Medicare Wellness Visit.    The following portions of the patient's history were reviewed and   updated as appropriate: allergies, current medications, past family history, past medical history, past social history, past surgical history, and problem list.    Compared to one year ago, the patient feels his physical   health is worse.    Compared to one year ago, the patient feels his mental   health is the same.    Recent Hospitalizations:  He was not admitted to the hospital during the last year.       Current Medical Providers:  Patient Care Team:  Natanael Cunha MD as PCP - General (Family Medicine)  Rebeca Parrish MD as Consulting Physician (Urology)  Nikki Carney PA (Pain Medicine)    Outpatient Medications Prior to Visit   Medication Sig Dispense Refill    baclofen (LIORESAL) 10 MG tablet Take 1 tablet by mouth Every Night for 90 days. 90 tablet 0    Cholecalciferol (Vitamin D3) 25 MCG (1000 UT) capsule Take 1 capsule by mouth Daily.      ferrous sulfate 325 (65 FE) MG tablet Take 1 tablet by mouth Daily.      fluticasone (FLONASE) 50 MCG/ACT nasal spray 1 spray into the nostril(s) as directed by provider Daily. (Patient taking differently: 1 spray into the nostril(s) as directed by provider As Needed.) 18 g 3    lisinopril (PRINIVIL,ZESTRIL) 20 MG tablet Take 1 tablet by mouth Daily. 90 tablet 3    loratadine (Claritin) 10 MG tablet Take 1 tablet by mouth Daily. 90 tablet 3    Magnesium 250 MG tablet Take 1 tablet by mouth 2 (Two) Times a Day.      nystatin-triamcinolone (MYCOLOG) 949313-6.1 UNIT/GM-% ointment Apply to affected area 2-3 times daily 30 g 1    Omega-3 1000 MG capsule Take 1 capsule by mouth Daily.      omeprazole (priLOSEC) 20 MG capsule Take 1 capsule by mouth Daily. 90 capsule 3    POTASSIUM PO Take  by mouth.       "rizatriptan (MAXALT) 5 MG tablet Take 1 tablet by mouth 1 (One) Time As Needed for Migraine. May repeat in 2 hours if needed 9 tablet 0    sertraline (ZOLOFT) 50 MG tablet Take 1 tablet by mouth Daily. 90 tablet 3    Vit C-Cholecalciferol-Nabila Hip (VITAMIN C & D3/NABILA HIPS PO) Take 1 tablet by mouth Daily.      Vitamin E 180 MG (400 UNIT) capsule capsule Take 1-2 capsules by mouth Daily. REPORTS TAKES 1 A DAY ONE WEEK AND THEN TAKES 2 A DAY THE NEXT WEEK      gabapentin (NEURONTIN) 400 MG capsule Take 1 capsule by mouth 4 (Four) Times a Day. 360 capsule 0     No facility-administered medications prior to visit.       No opioid medication identified on active medication list. I have reviewed chart for other potential  high risk medication/s and harmful drug interactions in the elderly.        Aspirin is not on active medication list.  Aspirin use is not indicated based on review of current medical condition/s. Risk of harm outweighs potential benefits.  .    Patient Active Problem List   Diagnosis    Chronic midline low back pain with bilateral sciatica    Essential hypertension    Dysthymic disorder    Elevated cholesterol    Medicare annual wellness visit, subsequent    History of abdominal aortic aneurysm repair    History of prostate cancer    GERD without esophagitis    Prediabetes    Chronic pansinusitis    Sensorineural hearing loss (SNHL) of both ears    Chronic right-sided headache    Tinnitus of both ears     Advance Care Planning   Advance Care Planning     Advance Directive is not on file.  ACP discussion was held with the patient during this visit. Patient has an advance directive (not in EMR), copy requested.     Objective    Vitals:    03/04/24 0827   BP: 130/76   BP Location: Right arm   Patient Position: Sitting   Cuff Size: Adult   Pulse: 65   Temp: 97.9 °F (36.6 °C)   TempSrc: Oral   Weight: 113 kg (248 lb 6.4 oz)   Height: 190.5 cm (75\")     Estimated body mass index is 31.05 kg/m² as calculated " "from the following:    Height as of this encounter: 190.5 cm (75\").    Weight as of this encounter: 113 kg (248 lb 6.4 oz).    BMI is >= 30 and <35. (Class 1 Obesity). The following options were offered after discussion;: nutrition counseling/recommendations      Does the patient have evidence of cognitive impairment? No    Lab Results   Component Value Date    TRIG 626 (H) 2024    HDL 27 (L) 2024    LDL 89 2024    VLDL 103 (H) 2024    HGBA1C 6.60 (H) 2024        HEALTH RISK ASSESSMENT    Smoking Status:  Social History     Tobacco Use   Smoking Status Former    Current packs/day: 0.00    Average packs/day: 4.5 packs/day for 40.0 years (179.9 ttl pk-yrs)    Types: Cigarettes    Start date: 7/15/1962    Quit date: 10/24/1996    Years since quittin.3    Passive exposure: Past   Smokeless Tobacco Never   Tobacco Comments    Yes smoked most of my life     Alcohol Consumption:  Social History     Substance and Sexual Activity   Alcohol Use Not Currently    Alcohol/week: 2.0 standard drinks of alcohol    Types: 2 Cans of beer per week    Comment: TWO BEERS PER MONTH WITH MEALS     Fall Risk Screen:    SHAN Fall Risk Assessment was completed, and patient is at LOW risk for falls.Assessment completed on:3/4/2024    Depression Screening:      3/4/2024     8:28 AM   PHQ-2/PHQ-9 Depression Screening   Little Interest or Pleasure in Doing Things 0-->not at all   Feeling Down, Depressed or Hopeless 0-->not at all   PHQ-9: Brief Depression Severity Measure Score 0       Health Habits and Functional and Cognitive Screening:      3/4/2024     8:29 AM   Functional & Cognitive Status   Do you have difficulty preparing food and eating? No   Do you have difficulty bathing yourself, getting dressed or grooming yourself? No   Do you have difficulty using the toilet? No   Do you have difficulty moving around from place to place? No   Do you have trouble with steps or getting out of a bed or a chair? No "   Current Diet Well Balanced Diet   Dental Exam Not up to date   Eye Exam Not up to date   Exercise (times per week) 3 times per week   Current Exercises Include Walking   Do you need help using the phone?  No   Are you deaf or do you have serious difficulty hearing?  Yes   Do you need help to go to places out of walking distance? No   Do you need help shopping? No   Do you need help preparing meals?  No   Do you need help with housework?  No   Do you need help with laundry? No   Do you need help taking your medications? No   Do you need help managing money? No   Do you ever drive or ride in a car without wearing a seat belt? No   Have you felt unusual stress, anger or loneliness in the last month? No   Who do you live with? Spouse   If you need help, do you have trouble finding someone available to you? No   Have you been bothered in the last four weeks by sexual problems? No   Do you have difficulty concentrating, remembering or making decisions? No       Age-appropriate Screening Schedule:  Refer to the list below for future screening recommendations based on patient's age, sex and/or medical conditions. Orders for these recommended tests are listed in the plan section. The patient has been provided with a written plan.    Health Maintenance   Topic Date Due    TDAP/TD VACCINES (1 - Tdap) Never done    ZOSTER VACCINE (1 of 2) Never done    INFLUENZA VACCINE  08/01/2023    BMI FOLLOWUP  03/01/2024    COVID-19 Vaccine (4 - 2023-24 season) 03/06/2024 (Originally 9/1/2023)    RSV Vaccine - Adults (1 - 1-dose 60+ series) 03/04/2025 (Originally 11/13/2010)    COLORECTAL CANCER SCREENING  09/15/2024    LIPID PANEL  01/29/2025    ANNUAL WELLNESS VISIT  03/04/2025    HEPATITIS C SCREENING  Completed    Pneumococcal Vaccine 65+  Completed    AAA SCREEN (ONE-TIME)  Completed                  CMS Preventative Services Quick Reference  Risk Factors Identified During Encounter  None Identified  The above risks/problems have  "been discussed with the patient.  Pertinent information has been shared with the patient in the After Visit Summary.  An After Visit Summary and PPPS were made available to the patient.    Follow Up:   Next Medicare Wellness visit to be scheduled in 1 year.       Additional E&M Note during same encounter follows:  Patient has multiple medical problems which are significant and separately identifiable that require additional work above and beyond the Medicare Wellness Visit.      Chief Complaint  Medicare Wellness-subsequent    Subjective        --CONTINUES ON ROUTINE GABAPENTIN FOR CHRONIC LOW BACK PAIN, STABLE  --TOLERATING BLOOD PRESSURE MEDICATION WITHOUT APPARENT SIDE EFFECTS  --FOLLOWED  BY NEUROLOGY FOR PERSISTENT RIGHT OCCIPITAL AND POSTERIOR NECK PAIN, MRI SHOWED SEVERE DJD.  CAROTID DOPPLER WAS OK.  S/W BETTER WITH THE ADDITION OF AN HS MUSCLE RELAXER      Ramirez Baltazar is also being seen today for PAIN, BLOOD PRESSURE    Review of Systems   Constitutional:  Negative for activity change, appetite change, chills, fatigue and fever.   HENT:  Negative for congestion, ear pain, hearing loss, rhinorrhea and sore throat.    Eyes:  Negative for discharge and visual disturbance.   Respiratory:  Negative for cough and shortness of breath.    Cardiovascular:  Negative for chest pain, palpitations and leg swelling.   Gastrointestinal:  Negative for abdominal pain, nausea and vomiting.   Genitourinary:  Negative for dysuria and hematuria.   Musculoskeletal:  Positive for arthralgias and myalgias.   Psychiatric/Behavioral:  Negative for dysphoric mood.        Objective   Vital Signs:  /76 (BP Location: Right arm, Patient Position: Sitting, Cuff Size: Adult)   Pulse 65   Temp 97.9 °F (36.6 °C) (Oral)   Ht 190.5 cm (75\")   Wt 113 kg (248 lb 6.4 oz)   BMI 31.05 kg/m²     Physical Exam  Vitals and nursing note reviewed.   Constitutional:       General: He is not in acute distress.     Appearance: Normal " appearance.   HENT:      Right Ear: Tympanic membrane normal.      Left Ear: Tympanic membrane normal.      Mouth/Throat:      Pharynx: Oropharynx is clear.   Eyes:      Conjunctiva/sclera: Conjunctivae normal.   Cardiovascular:      Rate and Rhythm: Normal rate and regular rhythm.      Heart sounds: Normal heart sounds. No murmur heard.  Pulmonary:      Effort: Pulmonary effort is normal.      Breath sounds: Normal breath sounds.   Abdominal:      General: Bowel sounds are normal.      Palpations: Abdomen is soft.      Tenderness: There is no abdominal tenderness.   Musculoskeletal:      Cervical back: Normal range of motion and neck supple.      Right lower leg: No edema.      Left lower leg: No edema.   Lymphadenopathy:      Cervical: No cervical adenopathy.   Neurological:      General: No focal deficit present.      Mental Status: He is alert.      Cranial Nerves: No cranial nerve deficit.      Coordination: Coordination normal.      Gait: Gait normal.   Psychiatric:         Mood and Affect: Mood normal.         Behavior: Behavior normal.          The following data was reviewed by: Natanael Cunha MD on 03/04/2024:  Common labs          9/25/2023    08:31 11/7/2023    11:14 1/29/2024    09:45   Common Labs   Glucose 144   133    BUN 13   17    Creatinine 0.99   1.00    Sodium 138   137    Potassium 4.8   4.4    Chloride 102   100    Calcium 9.5   9.7    Albumin 4.5   4.5    Total Bilirubin 0.2   0.4    Alkaline Phosphatase 77   56    AST (SGOT) 15   22    ALT (SGPT) 13   24    Total Cholesterol 245   219    Triglycerides 609   626    HDL Cholesterol 30   27    LDL Cholesterol  109   89    Hemoglobin A1C   6.60    PSA  <0.014                  Assessment and Plan   Diagnoses and all orders for this visit:    1. Medicare annual wellness visit, subsequent (Primary)  Assessment & Plan:  ADVICE GIVEN RE:  SEATBELT USE, ALCOHOL USE, HEALTHY DIET, ROUTINE EYE AND DENTAL EXAM, ROUTINE VACCINATIONS.        2.  Long-term use of high-risk medication  -     POC Medline 12 Panel Urine Drug Screen    3. Chronic right-sided headache  Assessment & Plan:  PLANS PER NEUROSURGEON, DOPPLER AND MRI REPORTS REVIEWED             4. Essential hypertension  Assessment & Plan:  Hypertension is stable and controlled  Continue current treatment regimen.  Blood pressure will be reassessed in 6 months.      5. Chronic midline low back pain with bilateral sciatica  Assessment & Plan:  STABLE ON CURRENT MEDICATION.  RONNIE REVIEWED.  TOX SCREEN IS UP TO DATE.  THE CONTINUED USE OF A CONTROLLED SUBSTANCE IS NECESSARY FOR THIS PATIENT TO HAVE A NEAR NORMAL QUALITY OF LIFE AND WILL BE REVIEWED AT THE NEXT ROUTINE VISIT.    Orders:  -     gabapentin (NEURONTIN) 400 MG capsule; Take 1 capsule by mouth 4 (Four) Times a Day.  Dispense: 360 capsule; Refill: 0             Follow Up   Return in about 6 months (around 9/4/2024).  Patient was given instructions and counseling regarding his condition or for health maintenance advice. Please see specific information pulled into the AVS if appropriate.

## 2024-03-18 ENCOUNTER — LAB (OUTPATIENT)
Dept: LAB | Facility: HOSPITAL | Age: 74
End: 2024-03-18
Payer: MEDICARE

## 2024-03-18 DIAGNOSIS — C61 PROSTATE CANCER: ICD-10-CM

## 2024-03-18 LAB — PSA SERPL-MCNC: <0.014 NG/ML (ref 0–4)

## 2024-03-18 PROCEDURE — 84153 ASSAY OF PSA TOTAL: CPT

## 2024-03-18 PROCEDURE — 36415 COLL VENOUS BLD VENIPUNCTURE: CPT

## 2024-03-20 ENCOUNTER — TELEPHONE (OUTPATIENT)
Dept: FAMILY MEDICINE CLINIC | Age: 74
End: 2024-03-20
Payer: MEDICARE

## 2024-03-20 ENCOUNTER — DOCUMENTATION (OUTPATIENT)
Dept: FAMILY MEDICINE CLINIC | Age: 74
End: 2024-03-20
Payer: MEDICARE

## 2024-03-20 RX ORDER — OMEPRAZOLE 40 MG/1
40 CAPSULE, DELAYED RELEASE ORAL DAILY
Qty: 60 CAPSULE | Refills: 3 | Status: SHIPPED | OUTPATIENT
Start: 2024-03-20

## 2024-03-20 NOTE — TELEPHONE ENCOUNTER
Caller: Kristi Baltazar    Relationship: Emergency Contact    Best call back number: [phone]     What is the best time to reach you: ANY    Who are you requesting to speak with (clinical staff, provider,  specific staff member): CLINICAL      What was the call regarding: REQUESTING A CALL BACK FROM BRIAN OLEA. THIS WAS ALL THE INFORMATION PROVIDED.

## 2024-03-20 NOTE — TELEPHONE ENCOUNTER
Pt is requesting the omeprazole 20 mg be increased. He said its not managing his acid reflux.  Please send to [facility] mail order.

## 2024-03-21 ENCOUNTER — OFFICE VISIT (OUTPATIENT)
Dept: UROLOGY | Facility: CLINIC | Age: 74
End: 2024-03-21
Payer: MEDICARE

## 2024-03-21 VITALS
DIASTOLIC BLOOD PRESSURE: 72 MMHG | WEIGHT: 248 LBS | BODY MASS INDEX: 30.84 KG/M2 | HEIGHT: 75 IN | SYSTOLIC BLOOD PRESSURE: 141 MMHG

## 2024-03-21 DIAGNOSIS — C61 PROSTATE CANCER: Primary | ICD-10-CM

## 2024-03-21 DIAGNOSIS — N48.0 BXO (BALANITIS XEROTICA OBLITERANS): ICD-10-CM

## 2024-03-21 NOTE — PROGRESS NOTES
"    UROLOGY OFFICE FOLLOW UP NOTE    Subjective   HPI  Ramirez Baltazar is a 73 y.o. male.   Presents for follow-up of prostate cancer with PSA prior.  Prostate cancer treated with RALP, 12/12/2022.   The patient presents today for a follow-up. He is accompanied by his wife today.     The patient is feeling tired; generally becomes more fatigued since surgery; wife states he has not completely regained his strength back.  She states that he works for 15 minutes and then he gets very tired and has to sit down.  Considering returning to pain management.     He states that his urination is steady and slowly improving.  Doing pelvic floor exercises.  The patient's wife states that he is doing a lot better than he thinks he is.      Update 8/15/2023: Patient presents for follow-up of prostate cancer with PSA prior.  Patient states overall he thinks he is doing \"excellent.\"  Has small amount of leakage 2-3x daily, he is pleased with this.  History of ED; not currently bothered to pursue possible options of treatment.  Denies acute episodes of balanitis since last visit.  No complaints today.     Update 11/14/2023: Patient presents for routine follow-up prostate cancer with PSA prior; h/o BXO. Remote h/o circ with Dr. Alcaraz.   States doing ok, has a headache. Undergoing neuro w/u.  Reports urinary spraying and some difficulty getting redundant foreskin back.   Report meatotomy at time of circ.   Has good stream just spray. Some burning and soreness.    Update 3/21/2024: Patient presents for routine follow-up prostate cancer with PSA prior; h/o BXO. Remote h/o circ with Dr. Alcaraz.  Cystoscopy at last visit.  His penis is doing pretty good. He has had a little bit of improvement since the last time I saw him. It is not bad enough that he wants to consider surgery. He thinks he can tolerate it. He is getting out and doing more now that the weather is cleared. He has sore muscles.    He has an appointment with Ros Islas, " "APRN on 03/26/2024; has been having bad headaches.     ____________  PSA  3/18/2024: <0.014  11/7/2023: <0.014  7/26/2023: <0.014  4/27/2022: <0.014  1/26/2023: <0.014  8/26/2022: 7.39  8/25/2021: 1.01  8/3/2020: 0.95    Office cystoscopy, 12/23/2023: Severe BX with blanching of the glans; no mass or palpable abnormality; meatus patent; thickening of the preputial tissue particularly on the ventral surface at the inferior portion of the meatus.  Cystoscope passed easily at the meatus; patent urethra, absent prostate; patient able to do Kegel and coapt the lumen; cystoscopy revealed one right and left ureteral orifice in the normal anatomic position, normal bladder mucosa and no tumors, masses or stones.        RALP path, 12/12/2022: [id number] (Alessandro 4+3=7) acinar adenocarcinoma; (+ JOSE LUIS, + bladder neck invasion, + margin left bladder neck)     TRUS prostate biopsy 9/8/2022: Bradley 4+4= 8 adenocarcinoma of the prostate (8/18 cores)      Results for orders placed or performed in visit on 03/18/24   PSA DIAGNOSTIC    Specimen: Blood   Result Value Ref Range    PSA <0.014 0.000 - 4.000 ng/mL       Review of systems  A review of systems was performed, and positive findings are noted in the HPI.    Objective     Vital Signs:   /72   Ht 190.5 cm (75\")   Wt 112 kg (248 lb)   BMI 31.00 kg/m²       Physical exam  No acute distress, well-nourished  Awake alert and oriented  Mood normal; affect normal    Problem List:  Patient Active Problem List   Diagnosis    Chronic midline low back pain with bilateral sciatica    Essential hypertension    Dysthymic disorder    Elevated cholesterol    Medicare annual wellness visit, subsequent    History of abdominal aortic aneurysm repair    History of prostate cancer    GERD without esophagitis    Prediabetes    Chronic pansinusitis    Sensorineural hearing loss (SNHL) of both ears    Chronic right-sided headache    Tinnitus of both ears       Assessment & Plan   Diagnoses and all " orders for this visit:    1. Prostate cancer (Primary)  -     PSA DIAGNOSTIC; Future    2. BXO (balanitis xerotica obliterans)       Prostate cancer.  PSA remains undetectable; continue to monitor   I will recheck his PSA in 6 months.    BXO reported as stable; patient declined physical exam today.  Encouraged to schedule earlier follow-up should this become more bothersome    Follow-up  The patient will follow up in 6 months, PSA prior      All questions addressed          Transcribed from ambient dictation for Rebeca Parrish MD by Leigha Salinas.  03/21/24   10:26 EDT    Patient or patient representative verbalized consent to the visit recording.  I have personally performed the services described in this document as transcribed by the above individual, and it is both accurate and complete.

## 2024-03-21 NOTE — TELEPHONE ENCOUNTER
Dr Cunha sent in a increased dose of the omeprazole to [facility].  Left message on his vm.

## 2024-03-26 ENCOUNTER — OFFICE VISIT (OUTPATIENT)
Dept: NEUROLOGY | Facility: CLINIC | Age: 74
End: 2024-03-26
Payer: MEDICARE

## 2024-03-26 VITALS
HEART RATE: 74 BPM | HEIGHT: 75 IN | BODY MASS INDEX: 30.46 KG/M2 | SYSTOLIC BLOOD PRESSURE: 130 MMHG | DIASTOLIC BLOOD PRESSURE: 64 MMHG | OXYGEN SATURATION: 98 % | WEIGHT: 245 LBS

## 2024-03-26 DIAGNOSIS — R51.9 CHRONIC DAILY HEADACHE: ICD-10-CM

## 2024-03-26 DIAGNOSIS — G43.909 MIGRAINE WITHOUT STATUS MIGRAINOSUS, NOT INTRACTABLE, UNSPECIFIED MIGRAINE TYPE: Primary | ICD-10-CM

## 2024-03-26 DIAGNOSIS — M62.838 MUSCLE SPASM: ICD-10-CM

## 2024-03-26 DIAGNOSIS — M54.2 CHRONIC NECK PAIN: ICD-10-CM

## 2024-03-26 DIAGNOSIS — G89.29 CHRONIC NECK PAIN: ICD-10-CM

## 2024-03-26 PROCEDURE — 3078F DIAST BP <80 MM HG: CPT | Performed by: NURSE PRACTITIONER

## 2024-03-26 PROCEDURE — 99214 OFFICE O/P EST MOD 30 MIN: CPT | Performed by: NURSE PRACTITIONER

## 2024-03-26 PROCEDURE — 3075F SYST BP GE 130 - 139MM HG: CPT | Performed by: NURSE PRACTITIONER

## 2024-03-26 PROCEDURE — 1160F RVW MEDS BY RX/DR IN RCRD: CPT | Performed by: NURSE PRACTITIONER

## 2024-03-26 PROCEDURE — 1159F MED LIST DOCD IN RCRD: CPT | Performed by: NURSE PRACTITIONER

## 2024-03-26 RX ORDER — FREMANEZUMAB-VFRM 225 MG/1.5ML
225 INJECTION SUBCUTANEOUS
Qty: 1.5 ML | Refills: 5 | Status: SHIPPED | OUTPATIENT
Start: 2024-03-26 | End: 2024-09-22

## 2024-03-26 RX ORDER — RIZATRIPTAN BENZOATE 10 MG/1
10 TABLET ORAL ONCE AS NEEDED
Qty: 36 TABLET | Refills: 1 | Status: SHIPPED | OUTPATIENT
Start: 2024-03-26 | End: 2024-09-22

## 2024-03-26 RX ORDER — BACLOFEN 10 MG/1
10 TABLET ORAL NIGHTLY
Qty: 90 TABLET | Refills: 1 | Status: SHIPPED | OUTPATIENT
Start: 2024-03-26 | End: 2024-09-22

## 2024-03-26 NOTE — PROGRESS NOTES
[facility] NEUROLOGY         Date of Visit: 3/26/2024    Name: Ramirez Baltazar    :  1950    PCP: Natanael Cunha MD    Visit Type: follow-up         Subjective     Patient ID: Ramirez is a 73 y.o. male.         History of Present Illness  I had the pleasure of seeing your patient today.  As you may know he is a 72-year-old male here today for follow up for complaints of headaches.  He was referred by his primary care physician.    History:    Patient does have a history of hypertension, hyperlipidemia, abdominal aortic aneurysm with stent placement, previous lumbar spinal surgery.    Patient states that he began experiencing symptoms of frequent headaches starting in 2022.  Patient states that the headaches are typically posterior in nature described as a pressure or aching like pain and then can radiate to the top of the head.  He rates them on a daily basis is approximately a 2-3 out of 10 on the pain scale however experiences at least 5 or more headache days per month that are more severe rated as a 10 out of 10 and associated with sensitivity to light and sound.  He denies any other symptoms such as dizziness, vision changes, lightheadedness with the headache.  He does not notice any triggers for the symptoms.    Patient does have a history of previous migraine headaches however they were infrequent in nature only occurring approximately 1 or 2 times per year.  Patient states that he has had multiple injuries to the neck and known arthritic changes of the neck based on an x-ray completed in .  He has never had any interventions for the cervical spine.    Patient did have a CT of the head done in May 2023 showing no significant abnormalities.     Patient did undergo MRI of the cervical spine showing multiple levels of degnerative changes including bone spurring, disc degneration, foramenal narrowing the worst being at C4-5 and C5-6 with severe left foraminal  stenosis with nerve root impingement.     Current:    At last appointment we did start patient on baclofen for treatment of neck pain and muscle spasm.  Patient states that the baclofen was helpful for the neck and has decreased his neck pain symptoms however he has not had any reduction in headache frequency.  He still reports daily headache with more severe headaches happening several times per week.  He has tried the Nurtec and Ubrelvy with little improvement in headache.  His primary care physician did prescribe him rizatriptan 5 mg which she state was helpful when he took 2 doses.  He denies any new symptoms with the headaches.  He denies any new numbness tingling or weakness of the upper or lower extremities.  No balance or gait issues.  No no bowel or bladder complaints.  No other new neurological complaints at today's visit.      The following portions of the patient's history were reviewed and updated as appropriate: allergies, current medications, past family history, past medical history, past social history, past surgical history, and problem list.                 Review of Systems   Constitutional:  Negative for activity change, appetite change, fatigue and unexpected weight change.   HENT:  Negative for hearing loss, tinnitus and trouble swallowing.         Phonophobia   Eyes:  Positive for photophobia. Negative for pain and visual disturbance.   Respiratory:  Negative for chest tightness and shortness of breath.    Cardiovascular:  Negative for palpitations.   Gastrointestinal:  Negative for nausea and vomiting.   Musculoskeletal:  Positive for neck pain. Negative for gait problem.   Neurological:  Positive for headaches. Negative for dizziness, tremors, seizures, syncope, facial asymmetry, speech difficulty, weakness, light-headedness and numbness.   Psychiatric/Behavioral:  Negative for confusion and sleep disturbance.             Current Medications:    Current Outpatient Medications   Medication  "Instructions    Ajovy 225 mg, Subcutaneous, Every 30 Days    baclofen (LIORESAL) 10 mg, Oral, Nightly    ferrous sulfate 325 mg, Oral, Daily    fluticasone (FLONASE) 50 MCG/ACT nasal spray 1 spray, Nasal, Daily    gabapentin (NEURONTIN) 400 mg, Oral, 4 Times Daily    lisinopril (PRINIVIL,ZESTRIL) 20 mg, Oral, Daily    loratadine (CLARITIN) 10 mg, Oral, Daily    Magnesium 250 MG tablet 1 tablet, Oral, 2 Times Daily    nystatin-triamcinolone (MYCOLOG) 932790-1.1 UNIT/GM-% ointment Apply to affected area 2-3 times daily    Omega-3 1000 MG capsule 1 capsule, Oral, Daily    omeprazole (PRILOSEC) 40 mg, Oral, Daily    POTASSIUM PO Oral    rizatriptan (MAXALT) 10 mg, Oral, Once As Needed, May repeat in 2 hours if needed    sertraline (ZOLOFT) 50 mg, Oral, Daily    Vit C-Cholecalciferol-Nabila Hip (VITAMIN C & D3/NABILA HIPS PO) 1 tablet, Oral, Daily    Vitamin D3 1,000 Units, Oral, Daily    Vitamin E 180 MG (400 UNIT) capsule capsule 1-2 capsules, Oral, Daily, REPORTS TAKES 1 A DAY ONE WEEK AND THEN TAKES 2 A DAY THE NEXT WEEK          /64   Pulse 74   Ht 190 cm (74.8\")   Wt 111 kg (245 lb)   SpO2 98%   BMI 30.78 kg/m²                Objective     Neurological Exam  Mental Status  Awake, alert and oriented to person, place and time. Speech is normal. Language is fluent with no aphasia.    Cranial Nerves  CN II: Visual fields full to confrontation.  CN III, IV, VI: Extraocular movements intact bilaterally. Normal lids and orbits bilaterally. Pupils equal round and reactive to light bilaterally.  CN V: Facial sensation is normal.  CN VII: Full and symmetric facial movement.  CN IX, X: Palate elevates symmetrically  CN XI: Shoulder shrug strength is normal.  CN XII: Tongue midline without atrophy or fasciculations.    Motor  Normal muscle bulk throughout. Normal muscle tone. No abnormal involuntary movements. Strength is 5/5 throughout all four extremities.    Sensory  Sensation is intact to light touch, pinprick, " vibration and proprioception in all four extremities.    Reflexes  Deep tendon reflexes are 2+ and symmetric in all four extremities.    Coordination    Finger-to-nose, rapid alternating movements and heel-to-shin normal bilaterally without dysmetria.    Gait  Normal casual, toe, heel and tandem gait.      Physical Exam  Constitutional:       Appearance: Normal appearance. He is normal weight.   HENT:      Head: Normocephalic.   Eyes:      General: Lids are normal.      Extraocular Movements: Extraocular movements intact.      Pupils: Pupils are equal, round, and reactive to light.   Neck:      Comments: Muscle spasm and tightness bilaterally  Pulmonary:      Effort: Pulmonary effort is normal.   Musculoskeletal:         General: Normal range of motion.      Cervical back: Muscular tenderness present.   Skin:     General: Skin is warm.   Neurological:      Motor: Motor strength is normal.     Coordination: Coordination is intact.      Deep Tendon Reflexes: Reflexes are normal and symmetric.   Psychiatric:         Mood and Affect: Mood normal.         Speech: Speech normal.         Behavior: Behavior normal.         Thought Content: Thought content normal.                     Assessment & Plan     Diagnoses and all orders for this visit:    1. Migraine without status migrainosus, not intractable, unspecified migraine type (Primary)  -     rizatriptan (MAXALT) 10 MG tablet; Take 1 tablet by mouth 1 (One) Time As Needed for Migraine (onset of migraine, may repeat dose in 2 hours. max of 2 doses in 24 hours.) for up to 180 days. May repeat in 2 hours if needed  Dispense: 36 tablet; Refill: 1  -     Fremanezumab-vfrm (Ajovy) 225 MG/1.5ML solution auto-injector; Inject 225 mg under the skin into the appropriate area as directed Every 30 (Thirty) Days for 180 days.  Dispense: 1.5 mL; Refill: 5    2. Chronic daily headache    3. Muscle spasm  -     baclofen (LIORESAL) 10 MG tablet; Take 1 tablet by mouth Every Night for 180  days.  Dispense: 90 tablet; Refill: 1    4. Chronic neck pain  -     baclofen (LIORESAL) 10 MG tablet; Take 1 tablet by mouth Every Night for 180 days.  Dispense: 90 tablet; Refill: 1         At this time we will continue patient on baclofen for management of neck pain.    I would like to go ahead and start patient on Ajovy for headache prophylaxis.  If medication is too expensive we did discuss also trialing Botox.    In addition I would like to continue rizatriptan however at 10 mg instead of 5.    Follow-up in 3 months or sooner if needed.       Ros TORRES    Neurology    [facility] [place]    Phone: [phone]    3/26/2024 , 14:46 EDT

## 2024-03-27 ENCOUNTER — TELEPHONE (OUTPATIENT)
Dept: NEUROLOGY | Facility: CLINIC | Age: 74
End: 2024-03-27
Payer: MEDICARE

## 2024-03-28 ENCOUNTER — PRIOR AUTHORIZATION (OUTPATIENT)
Dept: NEUROLOGY | Facility: CLINIC | Age: 74
End: 2024-03-28
Payer: MEDICARE

## 2024-03-28 NOTE — TELEPHONE ENCOUNTER
PA has been started on cover my meds for aimovig. Awaiting determination.       Approved today  PA Case: [id number], Status: Approved, Coverage Starts on: 1/1/2024 12:00:00 AM, Coverage Ends on: 6/26/2024 12:00:00 AM.  Authorization Expiration Date: 6/25/2024

## 2024-03-29 ENCOUNTER — TELEPHONE (OUTPATIENT)
Dept: NEUROLOGY | Facility: CLINIC | Age: 74
End: 2024-03-29
Payer: MEDICARE

## 2024-03-29 NOTE — TELEPHONE ENCOUNTER
Recd notice that pt was given temporary supply of rizatriptan d/t qty/day supply- I saw we sent in rx for qty 36 with 1 refill. This is for a 90day supply. Called mei to give clarification.   Was notified that they will have to move this up to the pharmacist - as it appears insurance wouldn't cover 36 for 90 but it approved for 36 for 30.     IVU

## 2024-04-03 ENCOUNTER — OFFICE VISIT (OUTPATIENT)
Dept: FAMILY MEDICINE CLINIC | Age: 74
End: 2024-04-03
Payer: MEDICARE

## 2024-04-03 VITALS
SYSTOLIC BLOOD PRESSURE: 165 MMHG | WEIGHT: 246.8 LBS | DIASTOLIC BLOOD PRESSURE: 83 MMHG | BODY MASS INDEX: 30.69 KG/M2 | HEIGHT: 75 IN | HEART RATE: 78 BPM

## 2024-04-03 DIAGNOSIS — I10 ESSENTIAL HYPERTENSION: Primary | ICD-10-CM

## 2024-04-03 DIAGNOSIS — G89.29 CHRONIC RIGHT-SIDED HEADACHE: ICD-10-CM

## 2024-04-03 DIAGNOSIS — E78.00 ELEVATED CHOLESTEROL: ICD-10-CM

## 2024-04-03 DIAGNOSIS — M54.42 CHRONIC MIDLINE LOW BACK PAIN WITH BILATERAL SCIATICA: ICD-10-CM

## 2024-04-03 DIAGNOSIS — R73.03 PREDIABETES: ICD-10-CM

## 2024-04-03 DIAGNOSIS — M54.41 CHRONIC MIDLINE LOW BACK PAIN WITH BILATERAL SCIATICA: ICD-10-CM

## 2024-04-03 DIAGNOSIS — G89.29 CHRONIC MIDLINE LOW BACK PAIN WITH BILATERAL SCIATICA: ICD-10-CM

## 2024-04-03 DIAGNOSIS — Z12.11 COLON CANCER SCREENING: ICD-10-CM

## 2024-04-03 DIAGNOSIS — K21.9 GERD WITHOUT ESOPHAGITIS: ICD-10-CM

## 2024-04-03 DIAGNOSIS — R51.9 CHRONIC RIGHT-SIDED HEADACHE: ICD-10-CM

## 2024-04-03 PROBLEM — Z00.00 MEDICARE ANNUAL WELLNESS VISIT, SUBSEQUENT: Status: RESOLVED | Noted: 2022-02-25 | Resolved: 2024-04-03

## 2024-04-03 PROBLEM — J30.89 OTHER ALLERGIC RHINITIS: Status: ACTIVE | Noted: 2024-04-03

## 2024-04-03 PROBLEM — J32.4 CHRONIC PANSINUSITIS: Status: RESOLVED | Noted: 2023-07-12 | Resolved: 2024-04-03

## 2024-04-03 RX ORDER — TIZANIDINE HYDROCHLORIDE 4 MG/1
4 CAPSULE, GELATIN COATED ORAL NIGHTLY PRN
Qty: 90 CAPSULE | Refills: 1 | Status: SHIPPED | OUTPATIENT
Start: 2024-04-03

## 2024-04-03 NOTE — PROGRESS NOTES
Chief Complaint  Spasms (Discuss muscle relaxer. Pt states he would also like a cancer screening for any type of cancer due to him having h/a for 2 years. ) and Headache    Subjective          Ramirez Baltazar presents to [facility]  History of Present Illness  --TOLERATING BLOOD PRESSURE MEDICATION WITHOUT APPARENT SIDE EFFECTS  --LAST CHOL  AND HGA1C WAS 6.6 % WITH DIETARY MEASURES ALONE  --NEUROLOGY IS NOW STARTING HIM ON AN INJECTABLE MED FOR HIS HEADACHE  --CONTINUES ON A DAILY MUSCLE RELAXER FOR THE CHRONIC LOW BACK PAIN (BACLOFEN) BUT WOULD RATHER TAKE TIZANIDINE INSTEAD  --GERD IS WELL CONTROLLED WITH THE PPI  --DUE FOR A REPEAT COLONOSCOPY         No Known Allergies     Health Maintenance Due   Topic Date Due    TDAP/TD VACCINES (1 - Tdap) Never done    ZOSTER VACCINE (1 of 2) Never done    COVID-19 Vaccine (4 - 2023-24 season) 09/01/2023    COLORECTAL CANCER SCREENING  09/15/2024        Current Outpatient Medications on File Prior to Visit   Medication Sig    Cholecalciferol (Vitamin D3) 25 MCG (1000 UT) capsule Take 1 capsule by mouth Daily.    ferrous sulfate 325 (65 FE) MG tablet Take 1 tablet by mouth Daily.    fluticasone (FLONASE) 50 MCG/ACT nasal spray 1 spray into the nostril(s) as directed by provider Daily. (Patient taking differently: 1 spray into the nostril(s) as directed by provider As Needed.)    Fremanezumab-vfrm (Ajovy) 225 MG/1.5ML solution auto-injector Inject 225 mg under the skin into the appropriate area as directed Every 30 (Thirty) Days for 180 days.    gabapentin (NEURONTIN) 400 MG capsule Take 1 capsule by mouth 4 (Four) Times a Day.    lisinopril (PRINIVIL,ZESTRIL) 20 MG tablet Take 1 tablet by mouth Daily.    loratadine (Claritin) 10 MG tablet Take 1 tablet by mouth Daily.    Magnesium 250 MG tablet Take 1 tablet by mouth 2 (Two) Times a Day.    nystatin-triamcinolone (MYCOLOG) 673959-8.1 UNIT/GM-% ointment Apply to affected area 2-3  "times daily    Omega-3 1000 MG capsule Take 1 capsule by mouth Daily.    omeprazole (priLOSEC) 40 MG capsule Take 1 capsule by mouth Daily.    POTASSIUM PO Take  by mouth.    rizatriptan (MAXALT) 10 MG tablet Take 1 tablet by mouth 1 (One) Time As Needed for Migraine (onset of migraine, may repeat dose in 2 hours. max of 2 doses in 24 hours.) for up to 180 days. May repeat in 2 hours if needed    sertraline (ZOLOFT) 50 MG tablet Take 1 tablet by mouth Daily.    Vit C-Cholecalciferol-Nabila Hip (VITAMIN C & D3/NABILA HIPS PO) Take 1 tablet by mouth Daily.    Vitamin E 180 MG (400 UNIT) capsule capsule Take 1-2 capsules by mouth Daily. REPORTS TAKES 1 A DAY ONE WEEK AND THEN TAKES 2 A DAY THE NEXT WEEK    [DISCONTINUED] baclofen (LIORESAL) 10 MG tablet Take 1 tablet by mouth Every Night for 180 days.     No current facility-administered medications on file prior to visit.       Immunization History   Administered Date(s) Administered    COVID-19 (MODERNA) 1st,2nd,3rd Dose Monovalent 06/25/2021, 07/22/2021    COVID-19 (MODERNA) Monovalent Original Booster 05/09/2022    Fluzone High Dose =>65 Years ([employer] ONLY) 11/29/2017, 11/29/2017    Fluzone High-Dose 65+yrs 11/09/2021    Pneumococcal Conjugate 13-Valent (PCV13) 05/26/2016    Pneumococcal Polysaccharide (PPSV23) 06/12/2017       Review of Systems   Constitutional:  Negative for activity change, appetite change, chills, fatigue and fever.   HENT:  Negative for congestion, ear pain, rhinorrhea and sore throat.    Respiratory:  Negative for cough and shortness of breath.    Cardiovascular:  Negative for chest pain, palpitations and leg swelling.   Gastrointestinal:  Negative for abdominal pain, constipation, diarrhea, nausea and vomiting.   Musculoskeletal:  Positive for arthralgias. Negative for myalgias.   Neurological:  Negative for headache.        Objective     /83 (BP Location: Left arm, Patient Position: Sitting)   Pulse 78   Ht 190 cm (74.8\")   Wt 112 kg " (246 lb 12.8 oz)   BMI 31.01 kg/m²       Physical Exam  Vitals and nursing note reviewed.   Constitutional:       General: He is not in acute distress.     Appearance: Normal appearance.   Cardiovascular:      Rate and Rhythm: Normal rate and regular rhythm.      Heart sounds: Normal heart sounds. No murmur heard.  Pulmonary:      Effort: Pulmonary effort is normal.      Breath sounds: Normal breath sounds.   Abdominal:      Palpations: Abdomen is soft.      Tenderness: There is no abdominal tenderness.   Musculoskeletal:      Cervical back: Neck supple.      Right lower leg: No edema.      Left lower leg: No edema.   Lymphadenopathy:      Cervical: No cervical adenopathy.   Neurological:      General: No focal deficit present.      Mental Status: He is alert.      Cranial Nerves: No cranial nerve deficit.      Coordination: Coordination normal.      Gait: Gait normal.   Psychiatric:         Mood and Affect: Mood normal.         Behavior: Behavior normal.         Result Review :                             Assessment and Plan      Diagnoses and all orders for this visit:    1. Essential hypertension (Primary)  Assessment & Plan:  Hypertension is stable and controlled  Continue current treatment regimen.  Blood pressure will be reassessed in 6 months.      2. Chronic midline low back pain with bilateral sciatica  Assessment & Plan:  WILL SWITCH TO THE DIFFERENT MUSCLE RELAXER     Orders:  -     TiZANidine (ZANAFLEX) 4 MG capsule; Take 1 capsule by mouth At Night As Needed for Muscle Spasms.  Dispense: 90 capsule; Refill: 1    3. Colon cancer screening  -     Ambulatory Referral For Screening Colonoscopy    4. Chronic right-sided headache  Assessment & Plan:  NEW MEDS PER NEUROLOGY AND NEW MUSCLE RELAXER AS NOTED               5. Elevated cholesterol  Assessment & Plan:   Lipid abnormalities are stable    Plan:  Continue same medication/s without change.      Discussed medication dosage, use, side effects, and goals  of treatment in detail.    Counseled patient on lifestyle modifications to help control hyperlipidemia.     Patient Treatment Goals:   LDL goal is less than 70    Followup in 6 months.  NOT QUITE AT GOAL.  HE IS WORKING ON DIET AND WILL RECHECK LATER IN THE YEAR       6. Prediabetes  Assessment & Plan:  STABLE CURRENTLY, WILL RECHECK LATER       7. GERD without esophagitis  Assessment & Plan:  IMPROVED WITH CURRENT TREATMENT, CONTINUE SAME, WILL REEVALUATE AT NEXT VISIT               Follow Up     Return in about 6 months (around 10/3/2024).    Patient was given instructions and counseling regarding his condition or for health maintenance advice. Please see specific information pulled into the AVS if appropriate.

## 2024-04-03 NOTE — ASSESSMENT & PLAN NOTE
Lipid abnormalities are stable    Plan:  Continue same medication/s without change.      Discussed medication dosage, use, side effects, and goals of treatment in detail.    Counseled patient on lifestyle modifications to help control hyperlipidemia.     Patient Treatment Goals:   LDL goal is less than 70    Followup in 6 months.  NOT QUITE AT GOAL.  HE IS WORKING ON DIET AND WILL RECHECK LATER IN THE YEAR

## 2024-04-25 ENCOUNTER — PRIOR AUTHORIZATION (OUTPATIENT)
Dept: FAMILY MEDICINE CLINIC | Age: 74
End: 2024-04-25
Payer: MEDICARE

## 2024-05-01 RX ORDER — ERENUMAB-AOOE 140 MG/ML
INJECTION, SOLUTION SUBCUTANEOUS
Qty: 1 ML | Refills: 0 | Status: SHIPPED | OUTPATIENT
Start: 2024-05-01

## 2024-05-07 ENCOUNTER — TELEPHONE (OUTPATIENT)
Dept: FAMILY MEDICINE CLINIC | Age: 74
End: 2024-05-07
Payer: MEDICARE

## 2024-05-19 DIAGNOSIS — H65.93 MIDDLE EAR EFFUSION, BILATERAL: ICD-10-CM

## 2024-05-20 RX ORDER — LORATADINE 10 MG/1
10 TABLET ORAL DAILY
Qty: 90 TABLET | Refills: 0 | Status: SHIPPED | OUTPATIENT
Start: 2024-05-20

## 2024-05-28 RX ORDER — ERENUMAB-AOOE 140 MG/ML
INJECTION, SOLUTION SUBCUTANEOUS
Qty: 1 ML | Refills: 2 | Status: SHIPPED | OUTPATIENT
Start: 2024-05-28

## 2024-05-30 ENCOUNTER — PRIOR AUTHORIZATION (OUTPATIENT)
Dept: NEUROLOGY | Facility: CLINIC | Age: 74
End: 2024-05-30

## 2024-05-30 NOTE — TELEPHONE ENCOUNTER
Approved today  PA Case: [id number], Status: Approved, Coverage Starts on: 2/29/2024 12:00:00 AM, Coverage Ends on: 5/30/2025 12:00:00 AM.      Fwding to myself to call pharmacy

## 2024-06-03 DIAGNOSIS — I10 ESSENTIAL HYPERTENSION: ICD-10-CM

## 2024-06-03 DIAGNOSIS — F34.1 DYSTHYMIC DISORDER: ICD-10-CM

## 2024-06-03 RX ORDER — LISINOPRIL 20 MG/1
20 TABLET ORAL DAILY
Qty: 90 TABLET | Refills: 0 | Status: SHIPPED | OUTPATIENT
Start: 2024-06-03

## 2024-06-03 NOTE — TELEPHONE ENCOUNTER
Caller: Ramirez Baltazar    Relationship: Self    Best call back number: [phone]    Requested Prescriptions:   Requested Prescriptions     Pending Prescriptions Disp Refills    lisinopril (PRINIVIL,ZESTRIL) 20 MG tablet 90 tablet 3     Sig: Take 1 tablet by mouth Daily.    sertraline (ZOLOFT) 50 MG tablet 90 tablet 3     Sig: Take 1 tablet by mouth Daily.        Pharmacy where request should be sent: [place] [id number] [address] [facility] [phone] [facility] [phone] [facility]     Last office visit with prescribing clinician: 4/3/2024   Last telemedicine visit with prescribing clinician: Visit date not found   Next office visit with prescribing clinician: 6/14/2024     PATIENT ALSO REQUESTING MD CHA TO CHECK ON HIS GABAPENTIN PRESCRIPTION. PATIENT STATES HE HAS BEEN TRYING TO GET REFILL ON THIS MEDICATION FOR A FEW MONTHS BUT IT HAS NEVER BEEN SENT IN TO ANY PHARMACY.     Does the patient have less than a 3 day supply:  [x] Yes  [] No    Would you like a call back once the refill request has been completed: [] Yes [] No    If the office needs to give you a call back, can they leave a voicemail: [] Yes [] No    Thelma Ewing   06/03/24 11:12 EDT

## 2024-06-14 ENCOUNTER — OFFICE VISIT (OUTPATIENT)
Dept: FAMILY MEDICINE CLINIC | Age: 74
End: 2024-06-14
Payer: MEDICARE

## 2024-06-14 ENCOUNTER — TELEPHONE (OUTPATIENT)
Dept: FAMILY MEDICINE CLINIC | Age: 74
End: 2024-06-14

## 2024-06-14 VITALS
BODY MASS INDEX: 29.42 KG/M2 | HEIGHT: 75 IN | SYSTOLIC BLOOD PRESSURE: 146 MMHG | DIASTOLIC BLOOD PRESSURE: 70 MMHG | HEART RATE: 67 BPM | WEIGHT: 236.6 LBS

## 2024-06-14 DIAGNOSIS — R51.9 CHRONIC RIGHT-SIDED HEADACHE: Primary | ICD-10-CM

## 2024-06-14 DIAGNOSIS — I10 ESSENTIAL HYPERTENSION: ICD-10-CM

## 2024-06-14 DIAGNOSIS — G89.29 CHRONIC MIDLINE LOW BACK PAIN WITH BILATERAL SCIATICA: ICD-10-CM

## 2024-06-14 DIAGNOSIS — M54.41 CHRONIC MIDLINE LOW BACK PAIN WITH BILATERAL SCIATICA: ICD-10-CM

## 2024-06-14 DIAGNOSIS — M54.42 CHRONIC MIDLINE LOW BACK PAIN WITH BILATERAL SCIATICA: ICD-10-CM

## 2024-06-14 DIAGNOSIS — F34.1 DYSTHYMIC DISORDER: ICD-10-CM

## 2024-06-14 DIAGNOSIS — G89.29 CHRONIC RIGHT-SIDED HEADACHE: Primary | ICD-10-CM

## 2024-06-14 PROCEDURE — 3077F SYST BP >= 140 MM HG: CPT | Performed by: FAMILY MEDICINE

## 2024-06-14 PROCEDURE — 1126F AMNT PAIN NOTED NONE PRSNT: CPT | Performed by: FAMILY MEDICINE

## 2024-06-14 PROCEDURE — 99214 OFFICE O/P EST MOD 30 MIN: CPT | Performed by: FAMILY MEDICINE

## 2024-06-14 PROCEDURE — 3078F DIAST BP <80 MM HG: CPT | Performed by: FAMILY MEDICINE

## 2024-06-14 PROCEDURE — G2211 COMPLEX E/M VISIT ADD ON: HCPCS | Performed by: FAMILY MEDICINE

## 2024-06-14 PROCEDURE — 1159F MED LIST DOCD IN RCRD: CPT | Performed by: FAMILY MEDICINE

## 2024-06-14 PROCEDURE — 1160F RVW MEDS BY RX/DR IN RCRD: CPT | Performed by: FAMILY MEDICINE

## 2024-06-14 RX ORDER — GABAPENTIN 400 MG/1
400 CAPSULE ORAL 4 TIMES DAILY
Qty: 360 CAPSULE | Refills: 0 | Status: SHIPPED | OUTPATIENT
Start: 2024-06-14

## 2024-06-14 NOTE — TELEPHONE ENCOUNTER
----- Message from Natanael Cunha sent at 6/14/2024 12:57 PM EDT -----  THIS MAN DOES NOT KNOW WHO HE WAS TO SEE FOR A COLONOSCOPY.  PLEASE CHECK INTO THIS AND MAKE HIM A NEW APPT IF NEEDED.  THANKS

## 2024-06-14 NOTE — PROGRESS NOTES
Chief Complaint  Back Pain (Follow up ) and Headache (Follow up )    Subjective          Ramirez Baltazar presents to [facility]  History of Present Illness  --TOLERATING BLOOD PRESSURE MEDICATION WITHOUT APPARENT SIDE EFFECTS  --THE DEPRESSION IS DOING WELL WITH THE SSRI  --CONTINUES ON ROUTINE GABAPENTIN FOR CHRONIC LOW BACK PAIN, STABLE  --THE HEADACHES HAVE IMPROVED S/W WITH THE INJECTABLE MIGRAINE MED THAT WAS STARTED BY NEUROLOGY        No Known Allergies     Health Maintenance Due   Topic Date Due    TDAP/TD VACCINES (1 - Tdap) Never done    ZOSTER VACCINE (1 of 2) Never done    COVID-19 Vaccine (4 - 2023-24 season) 09/01/2023    COLORECTAL CANCER SCREENING  09/15/2024        Current Outpatient Medications on File Prior to Visit   Medication Sig    Cholecalciferol (Vitamin D3) 25 MCG (1000 UT) capsule Take 1 capsule by mouth Daily.    EQ All Day Allergy Relief 10 MG tablet Take 1 tablet by mouth once daily    Erenumab-aooe (Aimovig) 140 MG/ML auto-injector INJECT 1ML SUBCUTANEOUSLY ONCE FOR ONE TIME DOSE AS DIRECTED    ferrous sulfate 325 (65 FE) MG tablet Take 1 tablet by mouth Daily.    fluticasone (FLONASE) 50 MCG/ACT nasal spray 1 spray into the nostril(s) as directed by provider Daily. (Patient taking differently: 1 spray into the nostril(s) as directed by provider As Needed.)    lisinopril (PRINIVIL,ZESTRIL) 20 MG tablet Take 1 tablet by mouth Daily.    Magnesium 250 MG tablet Take 1 tablet by mouth 2 (Two) Times a Day.    nystatin-triamcinolone (MYCOLOG) 163789-5.1 UNIT/GM-% ointment Apply to affected area 2-3 times daily    Omega-3 1000 MG capsule Take 1 capsule by mouth Daily.    omeprazole (priLOSEC) 40 MG capsule Take 1 capsule by mouth Daily.    POTASSIUM PO Take  by mouth.    rizatriptan (MAXALT) 10 MG tablet Take 1 tablet by mouth 1 (One) Time As Needed for Migraine (onset of migraine, may repeat dose in 2 hours. max of 2 doses in 24 hours.) for up to 180 days.  "May repeat in 2 hours if needed    sertraline (ZOLOFT) 50 MG tablet Take 1 tablet by mouth Daily.    TiZANidine (ZANAFLEX) 4 MG capsule Take 1 capsule by mouth At Night As Needed for Muscle Spasms.    Vit C-Cholecalciferol-Nabila Hip (VITAMIN C & D3/NABILA HIPS PO) Take 1 tablet by mouth Daily.    Vitamin E 180 MG (400 UNIT) capsule capsule Take 1-2 capsules by mouth Daily. REPORTS TAKES 1 A DAY ONE WEEK AND THEN TAKES 2 A DAY THE NEXT WEEK    [DISCONTINUED] Fremanezumab-vfrm (Ajovy) 225 MG/1.5ML solution auto-injector Inject 225 mg under the skin into the appropriate area as directed Every 30 (Thirty) Days for 180 days.    [DISCONTINUED] gabapentin (NEURONTIN) 400 MG capsule Take 1 capsule by mouth 4 (Four) Times a Day.     No current facility-administered medications on file prior to visit.       Immunization History   Administered Date(s) Administered    COVID-19 (MODERNA) 1st,2nd,3rd Dose Monovalent 06/25/2021, 07/22/2021    COVID-19 (MODERNA) Monovalent Original Booster 05/09/2022    Fluzone High Dose =>65 Years ([employer] ONLY) 11/29/2017, 11/29/2017    Fluzone High-Dose 65+yrs 11/09/2021    Pneumococcal Conjugate 13-Valent (PCV13) 05/26/2016    Pneumococcal Polysaccharide (PPSV23) 06/12/2017       Review of Systems   Constitutional:  Negative for activity change, appetite change, chills, fatigue and fever.   HENT:  Negative for congestion, ear pain, rhinorrhea and sore throat.    Respiratory:  Negative for cough and shortness of breath.    Cardiovascular:  Negative for chest pain, palpitations and leg swelling.   Gastrointestinal:  Negative for abdominal pain, constipation, diarrhea, nausea and vomiting.   Musculoskeletal:  Negative for arthralgias and myalgias.        Objective     /70 (BP Location: Right arm, Patient Position: Sitting)   Pulse 67   Ht 190 cm (74.8\")   Wt 107 kg (236 lb 9.6 oz)   BMI 29.73 kg/m²       Physical Exam  Vitals and nursing note reviewed.   Constitutional:       General: He is " not in acute distress.     Appearance: Normal appearance.   Cardiovascular:      Rate and Rhythm: Normal rate and regular rhythm.      Heart sounds: Normal heart sounds. No murmur heard.  Pulmonary:      Effort: Pulmonary effort is normal.      Breath sounds: Normal breath sounds.   Abdominal:      Palpations: Abdomen is soft.      Tenderness: There is no abdominal tenderness.   Musculoskeletal:      Cervical back: Neck supple.      Right lower leg: No edema.      Left lower leg: No edema.   Lymphadenopathy:      Cervical: No cervical adenopathy.   Neurological:      General: No focal deficit present.      Mental Status: He is alert.      Cranial Nerves: No cranial nerve deficit.      Coordination: Coordination normal.      Gait: Gait normal.   Psychiatric:         Mood and Affect: Mood normal.         Behavior: Behavior normal.         Result Review :                             Assessment and Plan      Diagnoses and all orders for this visit:    1. Chronic right-sided headache (Primary)  Assessment & Plan:  IMPROVED WITH CURRENT TREATMENT, CONTINUE SAME, WILL REEVALUATE AT NEXT VISIT   FURTHER PLANS PER NEUROLOGY               2. Essential hypertension  Assessment & Plan:  Hypertension is stable and controlled  Continue current treatment regimen.  Blood pressure will be reassessed in 6 months.      3. Chronic midline low back pain with bilateral sciatica  Assessment & Plan:  STABLE ON CURRENT MEDICATION.  RONNIE REVIEWED.  TOX SCREEN IS UP TO DATE.  THE CONTINUED USE OF A CONTROLLED SUBSTANCE IS NECESSARY FOR THIS PATIENT TO HAVE A NEAR NORMAL QUALITY OF LIFE AND WILL BE REVIEWED AT THE NEXT ROUTINE VISIT.    Orders:  -     gabapentin (NEURONTIN) 400 MG capsule; Take 1 capsule by mouth 4 (Four) Times a Day.  Dispense: 360 capsule; Refill: 0    4. Dysthymic disorder  Assessment & Plan:  Patient's depression is a single episode that is mild without psychosis. Depression is in partial remission and stable.    Plan:    Continue current medication therapy     Followup in 6 months.               Follow Up     Return in about 6 months (around 12/14/2024).    Patient was given instructions and counseling regarding his condition or for health maintenance advice. Please see specific information pulled into the AVS if appropriate.

## 2024-06-14 NOTE — ASSESSMENT & PLAN NOTE
IMPROVED WITH CURRENT TREATMENT, CONTINUE SAME, WILL REEVALUATE AT NEXT VISIT   FURTHER PLANS PER NEUROLOGY

## 2024-06-26 ENCOUNTER — OFFICE VISIT (OUTPATIENT)
Dept: NEUROLOGY | Facility: CLINIC | Age: 74
End: 2024-06-26
Payer: MEDICARE

## 2024-06-26 VITALS
HEIGHT: 75 IN | DIASTOLIC BLOOD PRESSURE: 78 MMHG | WEIGHT: 235 LBS | SYSTOLIC BLOOD PRESSURE: 126 MMHG | HEART RATE: 82 BPM | BODY MASS INDEX: 29.22 KG/M2

## 2024-06-26 DIAGNOSIS — G43.909 MIGRAINE WITHOUT STATUS MIGRAINOSUS, NOT INTRACTABLE, UNSPECIFIED MIGRAINE TYPE: ICD-10-CM

## 2024-06-26 RX ORDER — ERENUMAB-AOOE 140 MG/ML
140 INJECTION, SOLUTION SUBCUTANEOUS
Qty: 1 ML | Refills: 5 | Status: SHIPPED | OUTPATIENT
Start: 2024-06-26 | End: 2024-12-23

## 2024-06-26 RX ORDER — RIZATRIPTAN BENZOATE 10 MG/1
10 TABLET ORAL ONCE AS NEEDED
Qty: 36 TABLET | Refills: 1 | Status: SHIPPED | OUTPATIENT
Start: 2024-06-26 | End: 2024-12-23

## 2024-06-26 NOTE — PROGRESS NOTES
[facility] NEUROLOGY         Date of Visit: 2024    Name: Ramirez Baltazar    :  1950    PCP: Natanael Cunha MD    Visit Type: follow-up         Subjective     Patient ID: Ramirez is a 73 y.o. male.         History of Present Illness  I had the pleasure of seeing your patient today.  As you may know he is a 72-year-old male here today for follow up for complaints of headaches.  He was referred by his primary care physician.    History:    Patient does have a history of hypertension, hyperlipidemia, abdominal aortic aneurysm with stent placement, previous lumbar spinal surgery.    Patient states that he began experiencing symptoms of frequent headaches starting in 2022.  Patient states that the headaches are typically posterior in nature described as a pressure or aching like pain and then can radiate to the top of the head.  He rates them on a daily basis is approximately a 2-3 out of 10 on the pain scale however experiences at least 5 or more headache days per month that are more severe rated as a 10 out of 10 and associated with sensitivity to light and sound.  He denies any other symptoms such as dizziness, vision changes, lightheadedness with the headache.  He does not notice any triggers for the symptoms.    Patient does have a history of previous migraine headaches however they were infrequent in nature only occurring approximately 1 or 2 times per year.  Patient states that he has had multiple injuries to the neck and known arthritic changes of the neck based on an x-ray completed in .  He has never had any interventions for the cervical spine.    Patient did have a CT of the head done in May 2023 showing no significant abnormalities.     Patient did undergo MRI of the cervical spine showing multiple levels of degnerative changes including bone spurring, disc degneration, foramenal narrowing the worst being at C4-5 and C5-6 with severe left foraminal  stenosis with nerve root impingement.     Current:    Patient is currently taking Aimovig for headache prophylaxis as well as rizatriptan for abortive treatment.  Patient states that overall he has used so improvement in his headache symptoms since starting on the medications.  He states although he still has a very mild daily headache it is almost dull enough that he does not realize that he has a headache.  In addition to this he has had good results with rizatriptan for abortive treatment.  He states that the more severe headaches are now only occurring once to maybe twice per week and easily aborted with his rizatriptan.  He has been taking tizanidine as prescribed by his primary care physician for muscle spasm.  He does feel this works slightly better than the baclofen.  He denies any other new or worsening headache complaints, neck complaints, numbness tingling or weakness.      The following portions of the patient's history were reviewed and updated as appropriate: allergies, current medications, past family history, past medical history, past social history, past surgical history, and problem list.                 Review of Systems   Constitutional:  Negative for activity change, appetite change, fatigue and unexpected weight change.   HENT:  Negative for hearing loss, tinnitus and trouble swallowing.         Phonophobia   Eyes:  Positive for photophobia. Negative for pain and visual disturbance.   Respiratory:  Negative for chest tightness and shortness of breath.    Cardiovascular:  Negative for palpitations.   Gastrointestinal:  Negative for nausea and vomiting.   Musculoskeletal:  Positive for neck pain. Negative for gait problem.   Neurological:  Positive for headaches. Negative for dizziness, tremors, seizures, syncope, facial asymmetry, speech difficulty, weakness, light-headedness and numbness.   Psychiatric/Behavioral:  Negative for confusion and sleep disturbance.             Current  "Medications:    Current Outpatient Medications   Medication Instructions    Aimovig 140 mg, Subcutaneous, Every 30 Days    EQ All Day Allergy Relief 10 mg, Oral, Daily    ferrous sulfate 325 mg, Oral, Daily    fluticasone (FLONASE) 50 MCG/ACT nasal spray 1 spray, Nasal, Daily    gabapentin (NEURONTIN) 400 mg, Oral, 4 Times Daily    lisinopril (PRINIVIL,ZESTRIL) 20 mg, Oral, Daily    Magnesium 250 MG tablet 1 tablet, Oral, 2 Times Daily    nystatin-triamcinolone (MYCOLOG) 857529-5.1 UNIT/GM-% ointment Apply to affected area 2-3 times daily    Omega-3 1000 MG capsule 1 capsule, Oral, Daily    omeprazole (PRILOSEC) 40 mg, Oral, Daily    POTASSIUM PO Oral    rizatriptan (MAXALT) 10 mg, Oral, Once As Needed, May repeat in 2 hours if needed    sertraline (ZOLOFT) 50 mg, Oral, Daily    TiZANidine (ZANAFLEX) 4 mg, Oral, Nightly PRN    Vit C-Cholecalciferol-Nabila Hip (VITAMIN C & D3/NABILA HIPS PO) 1 tablet, Oral, Daily    Vitamin D3 1,000 Units, Oral, Daily    Vitamin E 180 MG (400 UNIT) capsule capsule 1-2 capsules, Oral, Daily, REPORTS TAKES 1 A DAY ONE WEEK AND THEN TAKES 2 A DAY THE NEXT WEEK          /78   Pulse 82   Ht 190 cm (74.8\")   Wt 107 kg (235 lb)   BMI 29.53 kg/m²                Objective     Neurological Exam  Mental Status  Awake, alert and oriented to person, place and time. Speech is normal. Language is fluent with no aphasia.    Cranial Nerves  CN II: Visual fields full to confrontation.  CN III, IV, VI: Extraocular movements intact bilaterally. Normal lids and orbits bilaterally. Pupils equal round and reactive to light bilaterally.  CN V: Facial sensation is normal.  CN VII: Full and symmetric facial movement.  CN IX, X: Palate elevates symmetrically  CN XI: Shoulder shrug strength is normal.  CN XII: Tongue midline without atrophy or fasciculations.    Motor  Normal muscle bulk throughout. Normal muscle tone. No abnormal involuntary movements. Strength is 5/5 throughout all four " extremities.    Sensory  Sensation is intact to light touch, pinprick, vibration and proprioception in all four extremities.    Reflexes  Deep tendon reflexes are 2+ and symmetric in all four extremities.    Coordination    Finger-to-nose, rapid alternating movements and heel-to-shin normal bilaterally without dysmetria.    Gait  Normal casual, toe, heel and tandem gait.      Physical Exam  Constitutional:       Appearance: Normal appearance. He is normal weight.   HENT:      Head: Normocephalic.   Eyes:      General: Lids are normal.      Extraocular Movements: Extraocular movements intact.      Pupils: Pupils are equal, round, and reactive to light.   Neck:      Comments: Muscle spasm and tightness bilaterally  Pulmonary:      Effort: Pulmonary effort is normal.   Musculoskeletal:         General: Normal range of motion.      Cervical back: Muscular tenderness present.   Skin:     General: Skin is warm.   Neurological:      Motor: Motor strength is normal.     Coordination: Coordination is intact.      Deep Tendon Reflexes: Reflexes are normal and symmetric.   Psychiatric:         Mood and Affect: Mood normal.         Speech: Speech normal.         Behavior: Behavior normal.         Thought Content: Thought content normal.                     Assessment & Plan     Diagnoses and all orders for this visit:    1. Migraine without status migrainosus, not intractable, unspecified migraine type  -     rizatriptan (MAXALT) 10 MG tablet; Take 1 tablet by mouth 1 (One) Time As Needed for Migraine (onset of migraine, may repeat dose in 2 hours. max of 2 doses in 24 hours.) for up to 180 days. May repeat in 2 hours if needed  Dispense: 36 tablet; Refill: 1  -     Erenumab-aooe (Aimovig) 140 MG/ML auto-injector; Inject 1 mL under the skin into the appropriate area as directed Every 30 (Thirty) Days for 180 days.  Dispense: 1 mL; Refill: 5         At this time we will go ahead and continue Aimovig and rizatriptan for treatment  of migraine headache.    Follow-up in 6 months or sooner if needed.       Ros TORRES    Neurology    [facility] [place]    Phone: [phone]    6/26/2024 , 15:23 EDT

## 2024-06-30 DIAGNOSIS — G43.909 MIGRAINE WITHOUT STATUS MIGRAINOSUS, NOT INTRACTABLE, UNSPECIFIED MIGRAINE TYPE: ICD-10-CM

## 2024-07-01 RX ORDER — RIZATRIPTAN BENZOATE 10 MG/1
TABLET ORAL
Qty: 36 TABLET | Refills: 1 | OUTPATIENT
Start: 2024-07-01

## 2024-07-30 ENCOUNTER — TELEPHONE (OUTPATIENT)
Dept: NEUROLOGY | Facility: CLINIC | Age: 74
End: 2024-07-30

## 2024-08-06 ENCOUNTER — OFFICE VISIT (OUTPATIENT)
Dept: NEUROLOGY | Facility: CLINIC | Age: 74
End: 2024-08-06
Payer: MEDICARE

## 2024-08-06 VITALS
DIASTOLIC BLOOD PRESSURE: 68 MMHG | HEIGHT: 75 IN | WEIGHT: 233 LBS | OXYGEN SATURATION: 99 % | BODY MASS INDEX: 28.97 KG/M2 | HEART RATE: 78 BPM | SYSTOLIC BLOOD PRESSURE: 124 MMHG

## 2024-08-06 DIAGNOSIS — G43.909 MIGRAINE WITHOUT STATUS MIGRAINOSUS, NOT INTRACTABLE, UNSPECIFIED MIGRAINE TYPE: Primary | ICD-10-CM

## 2024-08-06 DIAGNOSIS — K59.03 DRUG-INDUCED CONSTIPATION: ICD-10-CM

## 2024-08-06 PROCEDURE — 1160F RVW MEDS BY RX/DR IN RCRD: CPT | Performed by: NURSE PRACTITIONER

## 2024-08-06 PROCEDURE — 3078F DIAST BP <80 MM HG: CPT | Performed by: NURSE PRACTITIONER

## 2024-08-06 PROCEDURE — 99214 OFFICE O/P EST MOD 30 MIN: CPT | Performed by: NURSE PRACTITIONER

## 2024-08-06 PROCEDURE — 3074F SYST BP LT 130 MM HG: CPT | Performed by: NURSE PRACTITIONER

## 2024-08-06 PROCEDURE — 1159F MED LIST DOCD IN RCRD: CPT | Performed by: NURSE PRACTITIONER

## 2024-08-06 RX ORDER — BACLOFEN 10 MG/1
10 TABLET ORAL 3 TIMES DAILY
COMMUNITY

## 2024-08-06 RX ORDER — FREMANEZUMAB-VFRM 225 MG/1.5ML
225 INJECTION SUBCUTANEOUS
Qty: 1.5 ML | Refills: 5 | Status: SHIPPED | OUTPATIENT
Start: 2024-08-06 | End: 2025-02-02

## 2024-08-06 RX ORDER — RIZATRIPTAN BENZOATE 10 MG/1
10 TABLET ORAL ONCE AS NEEDED
Qty: 36 TABLET | Refills: 1 | Status: SHIPPED | OUTPATIENT
Start: 2024-08-06 | End: 2025-02-02

## 2024-08-06 NOTE — PROGRESS NOTES
[facility] NEUROLOGY         Date of Visit: 2024    Name: Ramirez Baltazar    :  1950    PCP: Natanael Cunha MD    Visit Type: follow-up         Subjective     Patient ID: Ramirez is a 73 y.o. male.         History of Present Illness  I had the pleasure of seeing your patient today.  As you may know he is a 72-year-old male here today for follow up for complaints of headaches.  He was referred by his primary care physician.    History:    Patient does have a history of hypertension, hyperlipidemia, abdominal aortic aneurysm with stent placement, previous lumbar spinal surgery.    Patient states that he began experiencing symptoms of frequent headaches starting in 2022.  Patient states that the headaches are typically posterior in nature described as a pressure or aching like pain and then can radiate to the top of the head.  He rates them on a daily basis is approximately a 2-3 out of 10 on the pain scale however experiences at least 5 or more headache days per month that are more severe rated as a 10 out of 10 and associated with sensitivity to light and sound.  He denies any other symptoms such as dizziness, vision changes, lightheadedness with the headache.  He does not notice any triggers for the symptoms.    Patient does have a history of previous migraine headaches however they were infrequent in nature only occurring approximately 1 or 2 times per year.  Patient states that he has had multiple injuries to the neck and known arthritic changes of the neck based on an x-ray completed in .  He has never had any interventions for the cervical spine.    Patient did have a CT of the head done in May 2023 showing no significant abnormalities.     Patient did undergo MRI of the cervical spine showing multiple levels of degnerative changes including bone spurring, disc degneration, foramenal narrowing the worst being at C4-5 and C5-6 with severe left foraminal  stenosis with nerve root impingement.     Current:    Patient is currently taking Aimovig for headache prophylaxis as well as rizatriptan for abortive treatment.  Patient called our office on 7/29/2024 reporting severe headache associated with nausea vomiting and dizziness issues.  We did end up encouraging patient to go to emergency room for evaluation.  Patient states that he did not end up going to the emergency room.  He states he figured out that he was very constipated and this was causing a lot of the symptoms for him.  Patient states he was able to treat the constipation at home and he has not had any additional worsening symptoms and is back to his typical baseline in regards to headaches.  He denies any other new symptoms.  He has had his rizatriptan again which has been helpful for aborting headaches as needed.  No other new neurological complaints at today's visit.  See headache questionnaire dated 8/6/2024 for additional information.      The following portions of the patient's history were reviewed and updated as appropriate: allergies, current medications, past family history, past medical history, past social history, past surgical history, and problem list.                 Review of Systems   Constitutional:  Negative for activity change, appetite change, fatigue and unexpected weight change.   HENT:  Negative for hearing loss, tinnitus and trouble swallowing.         Phonophobia   Eyes:  Positive for photophobia. Negative for pain and visual disturbance.   Respiratory:  Negative for chest tightness and shortness of breath.    Cardiovascular:  Negative for palpitations.   Gastrointestinal:  Negative for nausea and vomiting.   Musculoskeletal:  Positive for neck pain. Negative for gait problem.   Neurological:  Positive for headaches. Negative for dizziness, tremors, seizures, syncope, facial asymmetry, speech difficulty, weakness, light-headedness and numbness.   Psychiatric/Behavioral:  Negative  "for confusion and sleep disturbance.             Current Medications:    Current Outpatient Medications   Medication Instructions    Ajovy 225 mg, Subcutaneous, Every 30 Days    baclofen (LIORESAL) 10 mg, Oral, 3 Times Daily    EQ All Day Allergy Relief 10 mg, Oral, Daily    fluticasone (FLONASE) 50 MCG/ACT nasal spray 1 spray, Nasal, Daily    gabapentin (NEURONTIN) 400 mg, Oral, 4 Times Daily    lisinopril (PRINIVIL,ZESTRIL) 20 mg, Oral, Daily    Magnesium 250 MG tablet 1 tablet, 2 Times Daily    nystatin-triamcinolone (MYCOLOG) 426325-6.1 UNIT/GM-% ointment Apply to affected area 2-3 times daily    Omega-3 1000 MG capsule 1 capsule, Daily    omeprazole (PRILOSEC) 40 mg, Oral, Daily    POTASSIUM PO Take  by mouth.    rizatriptan (MAXALT) 10 mg, Oral, Once As Needed, May repeat in 2 hours if needed    sertraline (ZOLOFT) 50 mg, Oral, Daily    TiZANidine (ZANAFLEX) 4 mg, Oral, Nightly PRN    Vit C-Cholecalciferol-Nabila Hip (VITAMIN C & D3/NABILA HIPS PO) 1 tablet, Daily    Vitamin D3 1,000 Units, Oral, Daily    Vitamin E 180 MG (400 UNIT) capsule capsule 1-2 capsules, Daily          /68   Pulse 78   Ht 190 cm (74.8\")   Wt 106 kg (233 lb)   SpO2 99%   BMI 29.28 kg/m²                Objective     Neurological Exam  Mental Status  Awake, alert and oriented to person, place and time. Speech is normal. Language is fluent with no aphasia.    Cranial Nerves  CN II: Visual fields full to confrontation.  CN III, IV, VI: Extraocular movements intact bilaterally. Normal lids and orbits bilaterally. Pupils equal round and reactive to light bilaterally.  CN V: Facial sensation is normal.  CN VII: Full and symmetric facial movement.  CN IX, X: Palate elevates symmetrically  CN XI: Shoulder shrug strength is normal.  CN XII: Tongue midline without atrophy or fasciculations.    Motor  Normal muscle bulk throughout. Normal muscle tone. No abnormal involuntary movements. Strength is 5/5 throughout all four " extremities.    Sensory  Sensation is intact to light touch, pinprick, vibration and proprioception in all four extremities.    Reflexes  Deep tendon reflexes are 2+ and symmetric in all four extremities.    Coordination    Finger-to-nose, rapid alternating movements and heel-to-shin normal bilaterally without dysmetria.    Gait  Normal casual, toe, heel and tandem gait.      Physical Exam  Constitutional:       Appearance: Normal appearance. He is normal weight.   HENT:      Head: Normocephalic.   Eyes:      General: Lids are normal.      Extraocular Movements: Extraocular movements intact.      Pupils: Pupils are equal, round, and reactive to light.   Neck:      Comments: Muscle spasm and tightness bilaterally  Pulmonary:      Effort: Pulmonary effort is normal.   Musculoskeletal:         General: Normal range of motion.      Cervical back: Muscular tenderness present.   Skin:     General: Skin is warm.   Neurological:      Motor: Motor strength is normal.     Coordination: Coordination is intact.      Deep Tendon Reflexes: Reflexes are normal and symmetric.   Psychiatric:         Mood and Affect: Mood normal.         Speech: Speech normal.         Behavior: Behavior normal.         Thought Content: Thought content normal.                     Assessment & Plan     Diagnoses and all orders for this visit:    1. Migraine without status migrainosus, not intractable, unspecified migraine type (Primary)  -     Fremanezumab-vfrm (Ajovy) 225 MG/1.5ML solution auto-injector; Inject 225 mg under the skin into the appropriate area as directed Every 30 (Thirty) Days for 180 days.  Dispense: 1.5 mL; Refill: 5  -     rizatriptan (MAXALT) 10 MG tablet; Take 1 tablet by mouth 1 (One) Time As Needed for Migraine (onset of migraine, may repeat dose in 2 hours. max of 2 doses in 24 hours.) for up to 180 days. May repeat in 2 hours if needed  Dispense: 36 tablet; Refill: 1    2. Drug-induced constipation         At this time would  like to switch patient from Aimovig to Ajovy for headache prophylaxis as the medication is working well for preventing migraine however potentially could be causing some of his constipation symptoms.    Continue rizatriptan for abortive treatment.    Follow-up in 6 months or sooner if needed.       Ros TORRES    Neurology    [facility] [place]    Phone: [phone]    8/6/2024 , 15:56 EDT

## 2024-08-16 DIAGNOSIS — H65.93 MIDDLE EAR EFFUSION, BILATERAL: ICD-10-CM

## 2024-08-16 RX ORDER — LORATADINE 10 MG/1
10 TABLET ORAL DAILY
Qty: 90 TABLET | Refills: 0 | Status: SHIPPED | OUTPATIENT
Start: 2024-08-16

## 2024-09-03 DIAGNOSIS — F34.1 DYSTHYMIC DISORDER: ICD-10-CM

## 2024-09-03 DIAGNOSIS — I10 ESSENTIAL HYPERTENSION: ICD-10-CM

## 2024-09-03 RX ORDER — LISINOPRIL 20 MG/1
20 TABLET ORAL DAILY
Qty: 90 TABLET | Refills: 0 | Status: SHIPPED | OUTPATIENT
Start: 2024-09-03

## 2024-09-16 ENCOUNTER — TELEPHONE (OUTPATIENT)
Dept: UROLOGY | Facility: CLINIC | Age: 74
End: 2024-09-16
Payer: MEDICARE

## 2024-09-16 ENCOUNTER — LAB (OUTPATIENT)
Dept: LAB | Facility: HOSPITAL | Age: 74
End: 2024-09-16
Payer: MEDICARE

## 2024-09-16 DIAGNOSIS — C61 PROSTATE CANCER: ICD-10-CM

## 2024-09-16 LAB — PSA SERPL-MCNC: <0.014 NG/ML (ref 0–4)

## 2024-09-16 PROCEDURE — 84153 ASSAY OF PSA TOTAL: CPT

## 2024-09-16 PROCEDURE — 36415 COLL VENOUS BLD VENIPUNCTURE: CPT

## 2024-09-19 ENCOUNTER — OFFICE VISIT (OUTPATIENT)
Dept: UROLOGY | Facility: CLINIC | Age: 74
End: 2024-09-19
Payer: MEDICARE

## 2024-09-19 DIAGNOSIS — N48.0 BXO (BALANITIS XEROTICA OBLITERANS): ICD-10-CM

## 2024-09-19 DIAGNOSIS — C61 PROSTATE CANCER: Primary | ICD-10-CM

## 2024-09-20 ENCOUNTER — TELEPHONE (OUTPATIENT)
Dept: UROLOGY | Facility: CLINIC | Age: 74
End: 2024-09-20
Payer: MEDICARE

## 2024-09-30 RX ORDER — OMEPRAZOLE 40 MG/1
40 CAPSULE, DELAYED RELEASE ORAL DAILY
Qty: 90 CAPSULE | Refills: 0 | Status: SHIPPED | OUTPATIENT
Start: 2024-09-30

## 2024-10-04 ENCOUNTER — OFFICE VISIT (OUTPATIENT)
Dept: VASCULAR SURGERY | Facility: HOSPITAL | Age: 74
End: 2024-10-04
Payer: MEDICARE

## 2024-10-04 ENCOUNTER — HOSPITAL ENCOUNTER (OUTPATIENT)
Dept: CARDIOLOGY | Facility: HOSPITAL | Age: 74
Discharge: HOME | End: 2024-10-04
Payer: MEDICARE

## 2024-10-04 VITALS
SYSTOLIC BLOOD PRESSURE: 128 MMHG | OXYGEN SATURATION: 98 % | RESPIRATION RATE: 18 BRPM | TEMPERATURE: 98 F | DIASTOLIC BLOOD PRESSURE: 76 MMHG | HEART RATE: 61 BPM

## 2024-10-04 DIAGNOSIS — Z95.828 HISTORY OF ENDOVASCULAR STENT GRAFT FOR ABDOMINAL AORTIC ANEURYSM (AAA): ICD-10-CM

## 2024-10-04 DIAGNOSIS — I71.43 INFRARENAL ABDOMINAL AORTIC ANEURYSM (AAA) WITHOUT RUPTURE: ICD-10-CM

## 2024-10-04 DIAGNOSIS — I65.23 CAROTID ARTERY PLAQUE, BILATERAL: ICD-10-CM

## 2024-10-04 DIAGNOSIS — I71.43 INFRARENAL ABDOMINAL AORTIC ANEURYSM (AAA) WITHOUT RUPTURE: Primary | ICD-10-CM

## 2024-10-04 LAB
ABDOMINAL DIST AORTA AP: 2.6 CM
ABDOMINAL DIST AORTA TRANS: 3.2 CM
ABDOMINAL LT COM ILIAC AP: 1.2 CM
ABDOMINAL LT COM ILIAC TRANS: 0.9 CM
ABDOMINAL MID AORTA AP: 4.8 CM
ABDOMINAL MID AORTA TRANS: 4.8 CM
ABDOMINAL PROX AORTA AP: 1.7 CM
ABDOMINAL PROX AORTA TRANS: 1.8 CM
ABDOMINAL RT COM ILIAC AP: 1.1 CM
ABDOMINAL RT COM ILIAC TRANS: 1.6 CM

## 2024-10-04 PROCEDURE — 93978 VASCULAR STUDY: CPT

## 2024-10-04 NOTE — PROGRESS NOTES
[facility]     Progress Note    Patient Name: Ramirez Baltazar  : 1950  MRN: [id number]  Primary Care Physician:  Natanael Cunha MD  Date of admission: (Not on file)  Chief Complaint:    Chief Complaint   Patient presents with    Aortic Aneurysm     Patient is here as an annual follow up with an abdominal duplex today.        Subjective   Subjective     Ramirez Baltazar is a 73 y.o. male presents for annual follow-up with a aorta duplex performed today.  He had endovascular abdominal aortic aneurysm repair performed Dr. Bolton in 2021.  He denies any acute abdominal or back pain.  We had also previously seen him for right carotid duplex because he was having severe headaches.  He states he has made some medication adjustments and his headaches have slightly improved.  He is a former smoker who quit .  No complaints at this time.    Objective   Objective     Vitals:   Temp:  [98 °F (36.7 °C)] 98 °F (36.7 °C)  Heart Rate:  [61] 61  Resp:  [18] 18  BP: (128)/(76) 128/76    Physical Exam   General: Alert, no acute distress  Extremities: Symmetrical  Neuro: No gross deficits    Result Review    Result Review:  I have personally reviewed the results from the time of this admission to 10/4/2024 09:23 EDT and agree with these findings:  []  Laboratory  []  Microbiology  []  Radiology  []  EKG/Telemetry   []  Cardiology/Vascular   []  Pathology  []  Old records  []  Other:    Most notable findings include: The aorta duplex reveals no evidence of endoleak and a decrease to approximately 4.8 cm.    Assessment & Plan   Assessment / Plan     Assessment/Plan:  Diagnoses and all orders for this visit:    1. Infrarenal abdominal aortic aneurysm (AAA) without rupture (Primary)    2. Carotid artery plaque, bilateral    3. History of endovascular stent graft for abdominal aortic aneurysm (AAA)      Mr. Baltazar appears stable, he had an vascular abdominal aortic aneurysm repair performed Dr. Bolton  in March 2021.  The duplex reveals no evidence of endoleak and is measured at approximately 4.8 which is a decrease from the 4.9 seen on the CT performed in 2023.  I recommended follow-up in 1 year with a CTA of abdomen and pelvis and a carotid duplex.  He would prefer the CTA be scheduled in [place].     I have answered all of his questions and he is in agreement with the plan at this time.  Thank you for allowing me to participate in your patient's care.    Active Hospital Problems:  There are no active hospital problems to display for this patient.          Electronically signed by BRIAN Flower, 10/04/24, 8:58 AM EDT.

## 2024-10-11 ENCOUNTER — OFFICE VISIT (OUTPATIENT)
Dept: FAMILY MEDICINE CLINIC | Age: 74
End: 2024-10-11
Payer: MEDICARE

## 2024-10-11 VITALS
TEMPERATURE: 97.8 F | HEIGHT: 75 IN | BODY MASS INDEX: 29.24 KG/M2 | HEART RATE: 72 BPM | SYSTOLIC BLOOD PRESSURE: 124 MMHG | DIASTOLIC BLOOD PRESSURE: 70 MMHG | WEIGHT: 235.2 LBS

## 2024-10-11 DIAGNOSIS — G89.29 CHRONIC MIDLINE LOW BACK PAIN WITH BILATERAL SCIATICA: ICD-10-CM

## 2024-10-11 DIAGNOSIS — R73.03 PREDIABETES: ICD-10-CM

## 2024-10-11 DIAGNOSIS — M54.42 CHRONIC MIDLINE LOW BACK PAIN WITH BILATERAL SCIATICA: ICD-10-CM

## 2024-10-11 DIAGNOSIS — E78.00 ELEVATED CHOLESTEROL: Primary | ICD-10-CM

## 2024-10-11 DIAGNOSIS — M54.41 CHRONIC MIDLINE LOW BACK PAIN WITH BILATERAL SCIATICA: ICD-10-CM

## 2024-10-11 DIAGNOSIS — K21.9 GERD WITHOUT ESOPHAGITIS: ICD-10-CM

## 2024-10-11 DIAGNOSIS — I10 ESSENTIAL HYPERTENSION: ICD-10-CM

## 2024-10-11 PROCEDURE — 1159F MED LIST DOCD IN RCRD: CPT | Performed by: FAMILY MEDICINE

## 2024-10-11 PROCEDURE — 3074F SYST BP LT 130 MM HG: CPT | Performed by: FAMILY MEDICINE

## 2024-10-11 PROCEDURE — 99214 OFFICE O/P EST MOD 30 MIN: CPT | Performed by: FAMILY MEDICINE

## 2024-10-11 PROCEDURE — 3078F DIAST BP <80 MM HG: CPT | Performed by: FAMILY MEDICINE

## 2024-10-11 PROCEDURE — 1126F AMNT PAIN NOTED NONE PRSNT: CPT | Performed by: FAMILY MEDICINE

## 2024-10-11 PROCEDURE — 1160F RVW MEDS BY RX/DR IN RCRD: CPT | Performed by: FAMILY MEDICINE

## 2024-10-11 PROCEDURE — G2211 COMPLEX E/M VISIT ADD ON: HCPCS | Performed by: FAMILY MEDICINE

## 2024-10-11 RX ORDER — TIZANIDINE HYDROCHLORIDE 4 MG/1
4 CAPSULE, GELATIN COATED ORAL NIGHTLY PRN
Qty: 90 CAPSULE | Refills: 1 | Status: SHIPPED | OUTPATIENT
Start: 2024-10-11

## 2024-10-11 NOTE — ASSESSMENT & PLAN NOTE
Lipid abnormalities are stable    Plan:  Continue same medication/s without change.      Discussed medication dosage, use, side effects, and goals of treatment in detail.    Counseled patient on lifestyle modifications to help control hyperlipidemia.     Patient Treatment Goals:   LDL goal is less than 70    Followup in 6 months  NOT QUITE AT GOAL, WILL REASSESS AT NEXT VISIT .

## 2024-10-11 NOTE — PROGRESS NOTES
Chief Complaint  Hypertension (6 months)    Subjective          Ramirez Baltazar presents to [facility]  History of Present Illness  --TOLERATING BLOOD PRESSURE MEDICATION WITHOUT APPARENT SIDE EFFECTS  --LAST CHOL  AND HGA1C WAS 6.6 % WITH DIETARY MEASURES ALONE  --GERD IS WELL CONTROLLED WITH THE PPI  --CONTINUES ON ROUTINE GABAPENTIN FOR CHRONIC LOW BACK PAIN, STABLE        No Known Allergies     Health Maintenance Due   Topic Date Due    COLORECTAL CANCER SCREENING  09/15/2024        Current Outpatient Medications on File Prior to Visit   Medication Sig    Cholecalciferol (Vitamin D3) 25 MCG (1000 UT) capsule Take 1 capsule by mouth Daily.    EQ All Day Allergy Relief 10 MG tablet Take 1 tablet by mouth once daily    fluticasone (FLONASE) 50 MCG/ACT nasal spray 1 spray into the nostril(s) as directed by provider Daily.    gabapentin (NEURONTIN) 400 MG capsule Take 1 capsule by mouth 4 (Four) Times a Day.    lisinopril (PRINIVIL,ZESTRIL) 20 MG tablet Take 1 tablet by mouth once daily    Magnesium 250 MG tablet Take 1 tablet by mouth 2 (Two) Times a Day.    nystatin-triamcinolone (MYCOLOG) 000282-0.1 UNIT/GM-% ointment Apply to affected area 2-3 times daily    Omega-3 1000 MG capsule Take 1 capsule by mouth Daily.    omeprazole (priLOSEC) 40 MG capsule TAKE ONE CAPSULE BY MOUTH EVERY DAY    POTASSIUM PO Take  by mouth.    rizatriptan (MAXALT) 10 MG tablet Take 1 tablet by mouth 1 (One) Time As Needed for Migraine (onset of migraine, may repeat dose in 2 hours. max of 2 doses in 24 hours.) for up to 180 days. May repeat in 2 hours if needed    sertraline (ZOLOFT) 50 MG tablet Take 1 tablet by mouth once daily    Vit C-Cholecalciferol-Nabila Hip (VITAMIN C & D3/NABILA HIPS PO) Take 1 tablet by mouth Daily.    Vitamin E 180 MG (400 UNIT) capsule capsule Take 1-2 capsules by mouth Daily. REPORTS TAKES 1 A DAY ONE WEEK AND THEN TAKES 2 A DAY THE NEXT WEEK    [DISCONTINUED] baclofen  "(LIORESAL) 10 MG tablet Take 1 tablet by mouth 3 (Three) Times a Day.    [DISCONTINUED] Fremanezumab-vfrm (Ajovy) 225 MG/1.5ML solution auto-injector Inject 225 mg under the skin into the appropriate area as directed Every 30 (Thirty) Days for 180 days.    [DISCONTINUED] TiZANidine (ZANAFLEX) 4 MG capsule Take 1 capsule by mouth At Night As Needed for Muscle Spasms.     No current facility-administered medications on file prior to visit.       Immunization History   Administered Date(s) Administered    COVID-19 (MODERNA) 1st,2nd,3rd Dose Monovalent 06/25/2021, 07/22/2021    COVID-19 (MODERNA) Monovalent Original Booster 05/09/2022    Fluzone High-Dose 65+YRS 11/29/2017, 11/29/2017    Fluzone High-Dose 65+yrs 11/09/2021    Pneumococcal Conjugate 13-Valent (PCV13) 05/26/2016    Pneumococcal Polysaccharide (PPSV23) 06/12/2017       Review of Systems   Constitutional:  Negative for activity change, appetite change, chills, fatigue and fever.   HENT:  Negative for congestion, ear pain, rhinorrhea and sore throat.    Respiratory:  Negative for cough and shortness of breath.    Cardiovascular:  Negative for chest pain, palpitations and leg swelling.   Gastrointestinal:  Negative for abdominal pain, constipation, diarrhea, nausea and vomiting.   Musculoskeletal:  Positive for arthralgias. Negative for myalgias.        Objective     /70 (BP Location: Left arm, Patient Position: Sitting)   Pulse 72   Temp 97.8 °F (36.6 °C) (Oral)   Ht 190.5 cm (75\")   Wt 107 kg (235 lb 3.2 oz)   BMI 29.40 kg/m²       Physical Exam  Vitals and nursing note reviewed.   Constitutional:       General: He is not in acute distress.     Appearance: Normal appearance.   Cardiovascular:      Rate and Rhythm: Normal rate and regular rhythm.      Heart sounds: Normal heart sounds. No murmur heard.  Pulmonary:      Effort: Pulmonary effort is normal.      Breath sounds: Normal breath sounds.   Abdominal:      Palpations: Abdomen is soft.      " Tenderness: There is no abdominal tenderness.   Musculoskeletal:      Cervical back: Neck supple.      Right lower leg: No edema.      Left lower leg: No edema.   Lymphadenopathy:      Cervical: No cervical adenopathy.   Neurological:      General: No focal deficit present.      Mental Status: He is alert.      Cranial Nerves: No cranial nerve deficit.      Coordination: Coordination normal.      Gait: Gait normal.   Psychiatric:         Mood and Affect: Mood normal.         Behavior: Behavior normal.         Result Review :                             Assessment and Plan      Diagnoses and all orders for this visit:    1. Elevated cholesterol (Primary)  Assessment & Plan:   Lipid abnormalities are stable    Plan:  Continue same medication/s without change.      Discussed medication dosage, use, side effects, and goals of treatment in detail.    Counseled patient on lifestyle modifications to help control hyperlipidemia.     Patient Treatment Goals:   LDL goal is less than 70    Followup in 6 months  NOT QUITE AT GOAL, WILL REASSESS AT NEXT VISIT .      2. Essential hypertension  Assessment & Plan:  Hypertension is stable and controlled  Continue current treatment regimen.  Blood pressure will be reassessed in 6 months.      3. GERD without esophagitis  Assessment & Plan:  IMPROVED WITH CURRENT TREATMENT, CONTINUE SAME, WILL REEVALUATE AT NEXT VISIT       4. Prediabetes  Assessment & Plan:  IMPROVED WITH CURRENT TREATMENT, CONTINUE SAME, WILL REEVALUATE AT NEXT VISIT       5. Chronic midline low back pain with bilateral sciatica  Assessment & Plan:  STABLE ON CURRENT MEDICATION.  RONNIE REVIEWED.  TOX SCREEN IS UP TO DATE.  THE CONTINUED USE OF A CONTROLLED SUBSTANCE IS NECESSARY FOR THIS PATIENT TO HAVE A NEAR NORMAL QUALITY OF LIFE AND WILL BE REVIEWED AT THE NEXT ROUTINE VISIT.    Orders:  -     TiZANidine (ZANAFLEX) 4 MG capsule; Take 1 capsule by mouth At Night As Needed for Muscle Spasms.  Dispense: 90  capsule; Refill: 1                    Follow Up     Return in about 6 months (around 4/11/2025).    Patient was given instructions and counseling regarding his condition or for health maintenance advice. Please see specific information pulled into the AVS if appropriate.

## 2024-10-22 DIAGNOSIS — M54.41 CHRONIC MIDLINE LOW BACK PAIN WITH BILATERAL SCIATICA: ICD-10-CM

## 2024-10-22 DIAGNOSIS — G89.29 CHRONIC MIDLINE LOW BACK PAIN WITH BILATERAL SCIATICA: ICD-10-CM

## 2024-10-22 DIAGNOSIS — M54.42 CHRONIC MIDLINE LOW BACK PAIN WITH BILATERAL SCIATICA: ICD-10-CM

## 2024-10-22 NOTE — TELEPHONE ENCOUNTER
Rx Refill Note  Requested Prescriptions     Pending Prescriptions Disp Refills    gabapentin (NEURONTIN) 400 MG capsule [Pharmacy Med Name: Gabapentin 400 MG Oral Capsule] 360 capsule 0     Sig: Take 1 capsule by mouth 4 times daily      Last office visit with prescribing clinician: 10/11/2024   Last telemedicine visit with prescribing clinician: Visit date not found   Next office visit with prescribing clinician: 4/2/2025  Last refill sent: 06/14/24, #360  POC Medline 12 Panel Urine Drug Screen (03/04/2024 08:51)     Carmen Merino LPN  10/22/24, 14:54 EDT

## 2024-10-23 RX ORDER — GABAPENTIN 400 MG/1
400 CAPSULE ORAL 4 TIMES DAILY
Qty: 360 CAPSULE | Refills: 0 | Status: SHIPPED | OUTPATIENT
Start: 2024-10-23

## 2024-11-17 DIAGNOSIS — H65.93 MIDDLE EAR EFFUSION, BILATERAL: ICD-10-CM

## 2024-11-24 RX ORDER — LORATADINE 10 MG/1
10 TABLET ORAL DAILY
Qty: 90 TABLET | Refills: 3 | Status: SHIPPED | OUTPATIENT
Start: 2024-11-24

## 2024-11-29 DIAGNOSIS — F34.1 DYSTHYMIC DISORDER: ICD-10-CM

## 2024-11-29 DIAGNOSIS — I10 ESSENTIAL HYPERTENSION: ICD-10-CM

## 2024-12-02 RX ORDER — LISINOPRIL 20 MG/1
20 TABLET ORAL DAILY
Qty: 90 TABLET | Refills: 0 | Status: SHIPPED | OUTPATIENT
Start: 2024-12-02

## 2024-12-04 ENCOUNTER — TELEPHONE (OUTPATIENT)
Dept: SURGERY | Facility: CLINIC | Age: 74
End: 2024-12-04

## 2024-12-04 ENCOUNTER — PREP FOR SURGERY (OUTPATIENT)
Dept: OTHER | Facility: HOSPITAL | Age: 74
End: 2024-12-04
Payer: MEDICARE

## 2024-12-04 ENCOUNTER — OFFICE VISIT (OUTPATIENT)
Dept: SURGERY | Facility: CLINIC | Age: 74
End: 2024-12-04
Payer: MEDICARE

## 2024-12-04 VITALS
OXYGEN SATURATION: 98 % | HEART RATE: 70 BPM | WEIGHT: 244.71 LBS | SYSTOLIC BLOOD PRESSURE: 157 MMHG | HEIGHT: 75 IN | DIASTOLIC BLOOD PRESSURE: 79 MMHG | BODY MASS INDEX: 30.43 KG/M2

## 2024-12-04 DIAGNOSIS — Z12.11 SCREENING FOR MALIGNANT NEOPLASM OF COLON: Primary | ICD-10-CM

## 2024-12-04 RX ORDER — PEG-3350, SODIUM SULFATE, SODIUM CHLORIDE, POTASSIUM CHLORIDE, SODIUM ASCORBATE AND ASCORBIC ACID 7.5-2.691G
1000 KIT ORAL ONCE
Qty: 1000 ML | Refills: 0 | Status: SHIPPED | OUTPATIENT
Start: 2024-12-04 | End: 2024-12-04

## 2024-12-04 RX ORDER — SODIUM CHLORIDE 9 MG/ML
40 INJECTION, SOLUTION INTRAVENOUS AS NEEDED
OUTPATIENT
Start: 2024-12-04

## 2024-12-04 RX ORDER — SODIUM CHLORIDE 0.9 % (FLUSH) 0.9 %
10 SYRINGE (ML) INJECTION AS NEEDED
OUTPATIENT
Start: 2024-12-04

## 2024-12-04 RX ORDER — SODIUM CHLORIDE 0.9 % (FLUSH) 0.9 %
3 SYRINGE (ML) INJECTION EVERY 12 HOURS SCHEDULED
OUTPATIENT
Start: 2024-12-04

## 2024-12-04 NOTE — PROGRESS NOTES
Chief Complaint: Colonoscopy    Subjective      Colonoscopy consultation       History of Present Illness  Ramirez Baltazar is a 74 y.o. male presents to [facility] for colonoscopy consultation.    Patient presents today on referral from Dr. Natanael Cunha for colonoscopy consultation.  Patient denies any abdominal pain, change in bowel habit, or rectal bleeding.  Denies any family history of colorectal cancer.  Patient does have a history of prostate cancer.  No previous colonoscopy.    Denies JORGE.  Denies any cardiac issues.  Denies taking any GLP-1 receptors.    Objective     Past Medical History:   Diagnosis Date    Anxiety     Arthritis     Benign prostatic hyperplasia     Cancer     PROSTATE    Cluster headache     Elevated PSA     Headache, tension-type     HL (hearing loss)     Hypertension     Migraine 12 28 2022    After prostrate surgery started getting headaches. Sometimes worse than others.    Multiple bruises     LEFT ARM - SKIN INTACT       Past Surgical History:   Procedure Laterality Date    ABDOMINAL AORTA STENT      BACK SURGERY      CARPAL TUNNEL RELEASE Right     CATARACT EXTRACTION Right     COLONOSCOPY  2014    NORMAL    HAND SURGERY Left 08/05/2022    OTHER SURGICAL HISTORY      SCROTAL REPAIR D/T ACCIDENT    PROSTATECTOMY Bilateral 12/12/2022    Procedure: robotic assisted laparoscopic pROSTATECTOMY RADICAL WITH  bilateral pelvic LYMPH NODE DISSECTION;  Surgeon: Rebeca Parrish MD;  Location: [facility] OR;  Service: Robotics - [facility];  Laterality: Bilateral;    TONSILLECTOMY      TOOTH EXTRACTION      TOTAL SHOULDER ARTHROPLASTY W/ DISTAL CLAVICLE EXCISION Left 06/24/2019    Procedure: TOTAL SHOULDER REVERSE ARTHROPLASTY;  Surgeon: Enoch Goldman MD;  Location: [facility] OR;  Service: Orthopedics       Outpatient Medications Marked as Taking for the 12/4/24 encounter (Office Visit) with Madeline Miles APRN   Medication Sig Dispense Refill     Cholecalciferol (Vitamin D3) 25 MCG (1000 UT) capsule Take 1 capsule by mouth Daily.      fluticasone (FLONASE) 50 MCG/ACT nasal spray 1 spray into the nostril(s) as directed by provider Daily. 18 g 3    gabapentin (NEURONTIN) 400 MG capsule Take 1 capsule by mouth 4 times daily 360 capsule 0    lisinopril (PRINIVIL,ZESTRIL) 20 MG tablet Take 1 tablet by mouth once daily 90 tablet 0    loratadine (EQ All Day Allergy Relief) 10 MG tablet Take 1 tablet by mouth once daily 90 tablet 3    Magnesium 250 MG tablet Take 1 tablet by mouth 2 (Two) Times a Day.      nystatin-triamcinolone (MYCOLOG) 453698-6.1 UNIT/GM-% ointment Apply to affected area 2-3 times daily 30 g 1    Omega-3 1000 MG capsule Take 1 capsule by mouth Daily.      omeprazole (priLOSEC) 40 MG capsule TAKE ONE CAPSULE BY MOUTH EVERY DAY 90 capsule 0    POTASSIUM PO Take  by mouth.      rizatriptan (MAXALT) 10 MG tablet Take 1 tablet by mouth 1 (One) Time As Needed for Migraine (onset of migraine, may repeat dose in 2 hours. max of 2 doses in 24 hours.) for up to 180 days. May repeat in 2 hours if needed 36 tablet 1    sertraline (ZOLOFT) 50 MG tablet Take 1 tablet by mouth once daily 90 tablet 0    TiZANidine (ZANAFLEX) 4 MG capsule Take 1 capsule by mouth At Night As Needed for Muscle Spasms. 90 capsule 1    Vit C-Cholecalciferol-Nabila Hip (VITAMIN C & D3/NABILA HIPS PO) Take 1 tablet by mouth Daily.      Vitamin E 180 MG (400 UNIT) capsule capsule Take 1-2 capsules by mouth Daily. REPORTS TAKES 1 A DAY ONE WEEK AND THEN TAKES 2 A DAY THE NEXT WEEK         No Known Allergies     Family History   Problem Relation Age of Onset    Diabetes Mother     Malig Hyperthermia Neg Hx        Social History     Socioeconomic History    Marital status:    Tobacco Use    Smoking status: Former     Current packs/day: 0.00     Average packs/day: 4.5 packs/day for 40.0 years (179.9 ttl pk-yrs)     Types: Cigarettes     Start date: 7/15/1962     Quit date: 10/24/1996      "Years since quittin.1     Passive exposure: Past    Smokeless tobacco: Never    Tobacco comments:     Yes smoked most of my life   Vaping Use    Vaping status: Never Used   Substance and Sexual Activity    Alcohol use: Not Currently     Alcohol/week: 2.0 standard drinks of alcohol     Types: 2 Cans of beer per week     Comment: TWO BEERS PER MONTH WITH MEALS    Drug use: No    Sexual activity: Not Currently     Partners: Female     Birth control/protection: Vasectomy     Comment: Vasectomy         Review of Systems   Constitutional:  Negative for chills and fever.   Gastrointestinal:  Negative for abdominal distention, abdominal pain, anal bleeding, blood in stool, constipation, diarrhea and rectal pain.        Vital Signs:   /79 (BP Location: Right arm, Patient Position: Sitting, Cuff Size: Adult)   Pulse 70   Ht 190.5 cm (75\")   Wt 111 kg (244 lb 11.4 oz)   SpO2 98%   BMI 30.59 kg/m²      Physical Exam  Vitals and nursing note reviewed.   Constitutional:       General: He is not in acute distress.     Appearance: Normal appearance. He is not ill-appearing.   HENT:      Head: Normocephalic and atraumatic.   Cardiovascular:      Rate and Rhythm: Normal rate.   Pulmonary:      Effort: Pulmonary effort is normal.   Abdominal:      Palpations: Abdomen is soft.      Tenderness: There is no guarding.   Musculoskeletal:         General: No deformity. Normal range of motion.   Skin:     General: Skin is warm and dry.      Coloration: Skin is not jaundiced.   Neurological:      General: No focal deficit present.      Mental Status: He is alert and oriented to person, place, and time.   Psychiatric:         Mood and Affect: Mood normal.         Thought Content: Thought content normal.          Result Review :          []  Laboratory  []  Radiology  []  Pathology  []  Microbiology  []  EKG/Telemetry   []  Cardiology/Vascular   []  Old records  I spent 15 minutes caring for Ramirez on this date of service. " This time includes time spent by me in the following activities: reviewing tests, obtaining and/or reviewing a separately obtained history, performing a medically appropriate examination and/or evaluation, ordering medications, tests, or procedures, and documenting information in the medical record        Assessment and Plan    Diagnoses and all orders for this visit:    1. Screening for malignant neoplasm of colon (Primary)    Other orders  -     PEG-KCl-NaCl-NaSulf-Na Asc-C (MOVIPREP) 100 g reconstituted solution powder; Take 1,000 mL by mouth 1 (One) Time for 1 dose.  Dispense: 1000 mL; Refill: 0        Follow Up   Return for Schedule colonoscopy with Dr. Blanca on 1/16/2025 at [facility].    Hospital arrival time: 11:30.    Possible risks/complications, benefits, and alternatives to surgical or invasive procedures have been explained to patient and/or legal guardian.    Patient has been evaluated and can tolerate anesthesia and/or sedation. Risks, benefits, and alternatives to anesthesia and sedation have been explained to the patient and/or legal guardian. Patient verbalizes understanding and is willing to proceed with the above plan.     Patient was given instructions and counseling regarding his condition or for health maintenance advice. Please see specific information pulled into the AVS if appropriate.     Thank you for allowing me to participate in the care of this patient. Please call with questions or concerns.

## 2024-12-04 NOTE — TELEPHONE ENCOUNTER
PATIENT CALLED AND SAID THE BOWEL PREP PRESCRIPTION  FOR THE MOVIPREP IS GOING TO COST $128.00.  HE DOESN'T KNOW IF INSURANCE DOESN'T COVER IT, OR IF THAT WOULD JUST BE HIS PART.    HE SAID THE PHARMACIST TOLD HIM TO SEE IF HE COULD USE MIRALAX.    #[phone]

## 2024-12-19 RX ORDER — OMEPRAZOLE 40 MG/1
40 CAPSULE, DELAYED RELEASE ORAL DAILY
Qty: 90 CAPSULE | Refills: 0 | Status: SHIPPED | OUTPATIENT
Start: 2024-12-19

## 2025-01-09 NOTE — PAT
Left message.   Reminded of arrival time at 1130, Entrance C of the [facility]. Instructed to bring or have a  over the age of 18 set up to drive you home the day of procedure.    Instructed on clear liquid diet the day before, nothing red or purple. Call with any questions about the prep or if in need of the prep.  Reminded them not to eat or drink anything am of procedure unless its a sip of water with medications.

## 2025-01-15 ENCOUNTER — ANESTHESIA EVENT (OUTPATIENT)
Dept: GASTROENTEROLOGY | Facility: HOSPITAL | Age: 75
End: 2025-01-15
Payer: MEDICARE

## 2025-01-15 NOTE — ANESTHESIA PREPROCEDURE EVALUATION
Anesthesia Evaluation     Patient summary reviewed and Nursing notes reviewed                Airway   Mallampati: II  TM distance: >3 FB  No difficulty expected  Dental    (+) edentulous    Pulmonary - normal exam    breath sounds clear to auscultation  (+) a smoker Current, cigarettes,  Cardiovascular     ECG reviewed  Rhythm: regular  Rate: normal    (+) hypertension, hyperlipidemia      Neuro/Psych  (+) headaches (hx of mgraines, cluster, and tension headaches), numbness, psychiatric history (hx of anxiety and dysthymic disorder) Anxiety  GI/Hepatic/Renal/Endo    (+) GERD    Musculoskeletal     Abdominal  - normal exam    Abdomen: soft.  Bowel sounds: normal.   Substance History      OB/GYN          Other   arthritis,   history of cancer (hx of prostate cancer)    ROS/Med Hx Other: ECG 12 Lead     Date/Time: 5/10/2023 4:55 PM  Performed by: Natanael Cunha MD  Authorized by: Natanael Cunha MD   Comparison: not compared with previous ECG   Rhythm: sinus rhythm  Rate: normal  Conduction: conduction normal  ST Segments: ST segments normal  T Waves: T waves normal  QRS axis: normal  Clinical impression: normal ECG      Placement of duplex IVC iliac graft 10/4/24:  ·  4.7 cm maximum diameter mid abdominal aortic aneurysm.  Endograft in place.  No evidence of endoleak.  ·  No evidence for common iliac artery aneurysmal dilatation bilaterally.  ·  No significant change from CT scan dated 9/22/2023.     Carotid US 1/29/24:   ·  There is moderate carotid bifurcation plaque bilaterally.  ·  Proximal right internal carotid artery plaque without significant stenosis.  ·  All other right side carotid system vessels are normal.  ·  Proximal left internal carotid artery plaque without significant stenosis.  ·  Vertebral flow is antegrade bilaterally.    COLETTE Alonso APRN vascular sx office note:  Assessment/Plan:  Diagnoses and all orders for this visit:     1. Infrarenal abdominal aortic aneurysm (AAA) without  rupture (Primary)  2. Carotid artery plaque, bilateral  3. History of endovascular stent graft for abdominal aortic aneurysm (AAA)  Mr. Baltazar appears stable, he had an vascular abdominal aortic aneurysm repair performed Dr. Bolton in March 2021.  The duplex reveals no evidence of endoleak and is measured at approximately 4.8 which is a decrease from the 4.9 seen on the CT performed in 2023.  I recommended follow-up in 1 year with a CTA of abdomen and pelvis and a carotid duplex.  He would prefer the CTA be scheduled in [place].                       Anesthesia Plan    ASA 3     general   total IV anesthesia  (Patient understands anesthesia not responsible for dental damage. Risks explained including allergic reactions, BP, HR, O2 changes, aspiration, advanced airway placement. Pt verbalized understanding.)  intravenous induction     Anesthetic plan, risks, benefits, and alternatives have been provided, discussed and informed consent has been obtained with: patient.  Pre-procedure education provided  Plan discussed with CRNA.      CODE STATUS:

## 2025-01-16 ENCOUNTER — HOSPITAL ENCOUNTER (OUTPATIENT)
Facility: HOSPITAL | Age: 75
Setting detail: HOSPITAL OUTPATIENT SURGERY
Discharge: HOME | End: 2025-01-16
Attending: SURGERY | Admitting: SURGERY
Payer: MEDICARE

## 2025-01-16 ENCOUNTER — ANESTHESIA (OUTPATIENT)
Dept: GASTROENTEROLOGY | Facility: HOSPITAL | Age: 75
End: 2025-01-16
Payer: MEDICARE

## 2025-01-16 VITALS
OXYGEN SATURATION: 93 % | HEART RATE: 61 BPM | WEIGHT: 240.3 LBS | HEIGHT: 75 IN | SYSTOLIC BLOOD PRESSURE: 122 MMHG | BODY MASS INDEX: 29.88 KG/M2 | DIASTOLIC BLOOD PRESSURE: 77 MMHG | TEMPERATURE: 97.3 F | RESPIRATION RATE: 12 BRPM

## 2025-01-16 DIAGNOSIS — Z12.11 SCREENING FOR MALIGNANT NEOPLASM OF COLON: ICD-10-CM

## 2025-01-16 PROCEDURE — 88342 IMHCHEM/IMCYTCHM 1ST ANTB: CPT | Performed by: SURGERY

## 2025-01-16 PROCEDURE — 25010000002 PROPOFOL 10 MG/ML EMULSION

## 2025-01-16 PROCEDURE — 25810000003 LACTATED RINGERS PER 1000 ML: Performed by: NURSE ANESTHETIST, CERTIFIED REGISTERED

## 2025-01-16 PROCEDURE — 88341 IMHCHEM/IMCYTCHM EA ADD ANTB: CPT | Performed by: SURGERY

## 2025-01-16 PROCEDURE — 25010000002 LIDOCAINE PF 2% 2 % SOLUTION

## 2025-01-16 PROCEDURE — 88305 TISSUE EXAM BY PATHOLOGIST: CPT | Performed by: SURGERY

## 2025-01-16 RX ORDER — SODIUM CHLORIDE 0.9 % (FLUSH) 0.9 %
10 SYRINGE (ML) INJECTION AS NEEDED
Status: DISCONTINUED | OUTPATIENT
Start: 2025-01-16 | End: 2025-01-16 | Stop reason: HOSPADM

## 2025-01-16 RX ORDER — SODIUM CHLORIDE 9 MG/ML
40 INJECTION, SOLUTION INTRAVENOUS AS NEEDED
Status: DISCONTINUED | OUTPATIENT
Start: 2025-01-16 | End: 2025-01-16 | Stop reason: HOSPADM

## 2025-01-16 RX ORDER — PROPOFOL 10 MG/ML
VIAL (ML) INTRAVENOUS AS NEEDED
Status: DISCONTINUED | OUTPATIENT
Start: 2025-01-16 | End: 2025-01-16 | Stop reason: SURG

## 2025-01-16 RX ORDER — LIDOCAINE HYDROCHLORIDE 20 MG/ML
INJECTION, SOLUTION EPIDURAL; INFILTRATION; INTRACAUDAL; PERINEURAL AS NEEDED
Status: DISCONTINUED | OUTPATIENT
Start: 2025-01-16 | End: 2025-01-16 | Stop reason: SURG

## 2025-01-16 RX ORDER — SODIUM CHLORIDE 0.9 % (FLUSH) 0.9 %
3 SYRINGE (ML) INJECTION EVERY 12 HOURS SCHEDULED
Status: DISCONTINUED | OUTPATIENT
Start: 2025-01-16 | End: 2025-01-16 | Stop reason: HOSPADM

## 2025-01-16 RX ORDER — SODIUM CHLORIDE, SODIUM LACTATE, POTASSIUM CHLORIDE, CALCIUM CHLORIDE 600; 310; 30; 20 MG/100ML; MG/100ML; MG/100ML; MG/100ML
30 INJECTION, SOLUTION INTRAVENOUS CONTINUOUS
Status: DISCONTINUED | OUTPATIENT
Start: 2025-01-16 | End: 2025-01-16 | Stop reason: HOSPADM

## 2025-01-16 RX ADMIN — SODIUM CHLORIDE, POTASSIUM CHLORIDE, SODIUM LACTATE AND CALCIUM CHLORIDE 30 ML/HR: 600; 310; 30; 20 INJECTION, SOLUTION INTRAVENOUS at 11:06

## 2025-01-16 RX ADMIN — PROPOFOL 100 MG: 10 INJECTION, EMULSION INTRAVENOUS at 11:48

## 2025-01-16 RX ADMIN — LIDOCAINE HYDROCHLORIDE 100 MG: 20 INJECTION, SOLUTION EPIDURAL; INFILTRATION; INTRACAUDAL; PERINEURAL at 11:48

## 2025-01-16 RX ADMIN — PROPOFOL 200 MCG/KG/MIN: 10 INJECTION, EMULSION INTRAVENOUS at 11:49

## 2025-01-16 NOTE — H&P
General Surgery/Colorectal Surgery Note    Patient Name:  Ramirez Baltazar  YOB: 1950  [id number]    Referring Provider: Madeline Miles APRN      Patient Care Team:  Natanael Cunha MD as PCP - General (Family Medicine)  Rebeca Parrish MD as Consulting Physician (Urology)  Nikki Carney PA (Pain Medicine)  Madeline Miles APRN as Nurse Practitioner (Nurse Practitioner)    Subjective .     History of present illness:    HPI   referral from Dr. Natanael Cunha for colonoscopy consultation. Patient denies any abdominal pain, change in bowel habit, or rectal bleeding. Denies any family history of colorectal cancer. Patient does have a history of prostate cancer. No previous colonoscopy.     History:  Past Medical History:   Diagnosis Date    Anxiety     Arthritis     Benign prostatic hyperplasia     Cancer     PROSTATE    Cluster headache     Elevated PSA     Headache, tension-type     HL (hearing loss)     Hypertension     Migraine 12 28 2022    After prostrate surgery started getting headaches. Sometimes worse than others.    Multiple bruises     LEFT ARM - SKIN INTACT       Past Surgical History:   Procedure Laterality Date    ABDOMINAL AORTA STENT      BACK SURGERY      CARPAL TUNNEL RELEASE Right     CATARACT EXTRACTION Right     COLONOSCOPY  2014    NORMAL    HAND SURGERY Left 08/05/2022    OTHER SURGICAL HISTORY      SCROTAL REPAIR D/T ACCIDENT    PROSTATECTOMY Bilateral 12/12/2022    Procedure: robotic assisted laparoscopic pROSTATECTOMY RADICAL WITH  bilateral pelvic LYMPH NODE DISSECTION;  Surgeon: Rebeca Parrish MD;  Location: [facility] OR;  Service: Robotics - [facility];  Laterality: Bilateral;    TONSILLECTOMY      TOOTH EXTRACTION      TOTAL SHOULDER ARTHROPLASTY W/ DISTAL CLAVICLE EXCISION Left 06/24/2019    Procedure: TOTAL SHOULDER REVERSE ARTHROPLASTY;  Surgeon: Enoch Goldman MD;  Location: [facility] OR;  Service: Orthopedics       Family History   Problem Relation Age  of Onset    Diabetes Mother     Malig Hyperthermia Neg Hx        Social History     Tobacco Use    Smoking status: Former     Current packs/day: 0.00     Average packs/day: 4.5 packs/day for 40.0 years (179.9 ttl pk-yrs)     Types: Cigarettes     Start date: 7/15/1962     Quit date: 10/24/1996     Years since quittin.2     Passive exposure: Past    Smokeless tobacco: Never    Tobacco comments:     Yes smoked most of my life   Vaping Use    Vaping status: Never Used   Substance Use Topics    Alcohol use: Not Currently     Alcohol/week: 2.0 standard drinks of alcohol     Types: 2 Cans of beer per week     Comment: TWO BEERS PER MONTH WITH MEALS    Drug use: No       Review of Systems: See HPI    Review of Systems            Medications Prior to Admission   Medication Sig Dispense Refill Last Dose/Taking    Cholecalciferol (Vitamin D3) 25 MCG (1000 UT) capsule Take 1 capsule by mouth Daily.       fluticasone (FLONASE) 50 MCG/ACT nasal spray 1 spray into the nostril(s) as directed by provider Daily. 18 g 3     gabapentin (NEURONTIN) 400 MG capsule Take 1 capsule by mouth 4 times daily 360 capsule 0     lisinopril (PRINIVIL,ZESTRIL) 20 MG tablet Take 1 tablet by mouth once daily 90 tablet 0     loratadine (EQ All Day Allergy Relief) 10 MG tablet Take 1 tablet by mouth once daily 90 tablet 3     Magnesium 250 MG tablet Take 1 tablet by mouth 2 (Two) Times a Day.       nystatin-triamcinolone (MYCOLOG) 892750-3.1 UNIT/GM-% ointment Apply to affected area 2-3 times daily 30 g 1     Omega-3 1000 MG capsule Take 1 capsule by mouth Daily.       omeprazole (priLOSEC) 40 MG capsule TAKE ONE CAPSULE BY MOUTH EVERY DAY 90 capsule 0     POTASSIUM PO Take  by mouth.       rizatriptan (MAXALT) 10 MG tablet Take 1 tablet by mouth 1 (One) Time As Needed for Migraine (onset of migraine, may repeat dose in 2 hours. max of 2 doses in 24 hours.) for up to 180 days. May repeat in 2 hours if needed 36 tablet 1     sertraline (ZOLOFT) 50  MG tablet Take 1 tablet by mouth once daily 90 tablet 0     TiZANidine (ZANAFLEX) 4 MG capsule Take 1 capsule by mouth At Night As Needed for Muscle Spasms. 90 capsule 1     Vit C-Cholecalciferol-Nabila Hip (VITAMIN C & D3/NABILA HIPS PO) Take 1 tablet by mouth Daily.       Vitamin E 180 MG (400 UNIT) capsule capsule Take 1-2 capsules by mouth Daily. REPORTS TAKES 1 A DAY ONE WEEK AND THEN TAKES 2 A DAY THE NEXT WEEK            Home Meds:      Prior to Admission medications    Medication Sig Start Date End Date Taking? Authorizing Provider   Cholecalciferol (Vitamin D3) 25 MCG (1000 UT) capsule Take 1 capsule by mouth Daily.    ProviderMisty MD   fluticasone (FLONASE) 50 MCG/ACT nasal spray 1 spray into the nostril(s) as directed by provider Daily. 5/10/23   Adry Rodriguez APRN   gabapentin (NEURONTIN) 400 MG capsule Take 1 capsule by mouth 4 times daily 10/23/24   Natanael Cunha MD   lisinopril (PRINIVIL,ZESTRIL) 20 MG tablet Take 1 tablet by mouth once daily 12/2/24   Natanael Cunha MD   loratadine (EQ All Day Allergy Relief) 10 MG tablet Take 1 tablet by mouth once daily 11/24/24   Natanael Cunha MD   Magnesium 250 MG tablet Take 1 tablet by mouth 2 (Two) Times a Day.    ProviderMisty MD   nystatin-triamcinolone (MYCOLOG) 688496-9.1 UNIT/GM-% ointment Apply to affected area 2-3 times daily 11/14/23   Rebeca Parrish MD   Omega-3 1000 MG capsule Take 1 capsule by mouth Daily.    ProviderMisty MD   omeprazole (priLOSEC) 40 MG capsule TAKE ONE CAPSULE BY MOUTH EVERY DAY 12/19/24   Natanael Cunha MD   POTASSIUM PO Take  by mouth.    Misty Renae MD   rizatriptan (MAXALT) 10 MG tablet Take 1 tablet by mouth 1 (One) Time As Needed for Migraine (onset of migraine, may repeat dose in 2 hours. max of 2 doses in 24 hours.) for up to 180 days. May repeat in 2 hours if needed 8/6/24 2/2/25  Ros Islas APRN   sertraline (ZOLOFT) 50 MG tablet Take 1  tablet by mouth once daily 12/2/24   Natanael Cunha MD   TiZANidine (ZANAFLEX) 4 MG capsule Take 1 capsule by mouth At Night As Needed for Muscle Spasms. 10/11/24   Natanael Cunha MD   Vit C-Cholecalciferol-Nabila Hip (VITAMIN C & D3/NABILA HIPS PO) Take 1 tablet by mouth Daily.    ProviderMisty MD   Vitamin E 180 MG (400 UNIT) capsule capsule Take 1-2 capsules by mouth Daily. REPORTS TAKES 1 A DAY ONE WEEK AND THEN TAKES 2 A DAY THE NEXT WEEK    Provider, MD Misty            Allergies:  Patient has no known allergies.      Objective     Vital Signs        Physical Exam:     Physical Exam    NAD, A/O x 4, normal circulation, normal respiration      Result Review :Labs  Result Review  Imaging  Med Tab  Media    Assessment & Plan     There are no diagnoses linked to this encounter.       Risks including bleeding, perforation, pain, infection, missed polyp(s). Benefits, and alternatives of Colonoscopy discussed with patient.  All questions answered.  Consent verified.         Alek Blanca MD  01/16/25 10:25 EST

## 2025-01-16 NOTE — ANESTHESIA POSTPROCEDURE EVALUATION
Patient: Ramirez Baltazar    Procedure Summary       Date: 01/16/25 Room / Location: [facility] 1 / [facility]    Anesthesia Start: 1146 Anesthesia Stop: 1208    Procedure: COLONOSCOPY with polypectomy Diagnosis:       Screening for malignant neoplasm of colon      (Screening for malignant neoplasm of colon [Z12.11])    Surgeons: Alek Blanca MD Provider: Aldo Rodriguez CRNA    Anesthesia Type: general ASA Status: 3            Anesthesia Type: general    Vitals  Vitals Value Taken Time   /77 01/16/25 1223   Temp 36.3 °C (97.3 °F) 01/16/25 1223   Pulse 64 01/16/25 1224   Resp 12 01/16/25 1223   SpO2 95 % 01/16/25 1224   Vitals shown include unfiled device data.        Post Anesthesia Care and Evaluation    Post-procedure mental status: acceptable.  Pain management: satisfactory to patient    Airway patency: patent  Anesthetic complications: No anesthetic complications    Cardiovascular status: acceptable  Respiratory status: acceptable    Comments: Per chart review

## 2025-01-17 LAB
CYTO UR: NORMAL
LAB AP CASE REPORT: NORMAL
LAB AP CLINICAL INFORMATION: NORMAL
LAB AP SPECIAL STAINS: NORMAL
PATH REPORT.FINAL DX SPEC: NORMAL
PATH REPORT.GROSS SPEC: NORMAL

## 2025-02-12 DIAGNOSIS — M54.41 CHRONIC MIDLINE LOW BACK PAIN WITH BILATERAL SCIATICA: ICD-10-CM

## 2025-02-12 DIAGNOSIS — M54.42 CHRONIC MIDLINE LOW BACK PAIN WITH BILATERAL SCIATICA: ICD-10-CM

## 2025-02-12 DIAGNOSIS — G89.29 CHRONIC MIDLINE LOW BACK PAIN WITH BILATERAL SCIATICA: ICD-10-CM

## 2025-02-12 RX ORDER — GABAPENTIN 400 MG/1
400 CAPSULE ORAL 4 TIMES DAILY
Qty: 360 CAPSULE | Refills: 0 | Status: SHIPPED | OUTPATIENT
Start: 2025-02-12

## 2025-02-12 NOTE — TELEPHONE ENCOUNTER
Rx Refill Note  Requested Prescriptions     Pending Prescriptions Disp Refills    gabapentin (NEURONTIN) 400 MG capsule [Pharmacy Med Name: Gabapentin 400 MG Oral Capsule] 360 capsule 0     Sig: Take 1 capsule by mouth 4 times daily      Last office visit with prescribing clinician: 10/11/2024   Last telemedicine visit with prescribing clinician: Visit date not found   Next office visit with prescribing clinician: 4/2/2025  Last refill sent: 10/23/24, #360  POC Medline 12 Panel Urine Drug Screen (03/04/2024 08:51)     Carmen Merino LPN  02/12/25, 10:17 EST

## 2025-02-26 DIAGNOSIS — F34.1 DYSTHYMIC DISORDER: ICD-10-CM

## 2025-02-26 DIAGNOSIS — I10 ESSENTIAL HYPERTENSION: ICD-10-CM

## 2025-02-27 NOTE — TELEPHONE ENCOUNTER
Rx Refill Note  Requested Prescriptions     Pending Prescriptions Disp Refills    lisinopril (PRINIVIL,ZESTRIL) 20 MG tablet [Pharmacy Med Name: Lisinopril 20 MG Oral Tablet] 90 tablet 0     Sig: Take 1 tablet by mouth once daily    sertraline (ZOLOFT) 50 MG tablet [Pharmacy Med Name: Sertraline HCl 50 MG Oral Tablet] 90 tablet 0     Sig: Take 1 tablet by mouth once daily      Last office visit with prescribing clinician: 10/11/2024   Last telemedicine visit with prescribing clinician: Visit date not found   Next office visit with prescribing clinician: 4/2/2025  ACE Inhibitors Protocol Failed     Carmen Merino, LPN  02/27/25, 10:03 EST

## 2025-02-28 RX ORDER — LISINOPRIL 20 MG/1
20 TABLET ORAL DAILY
Qty: 90 TABLET | Refills: 3 | Status: SHIPPED | OUTPATIENT
Start: 2025-02-28

## 2025-03-19 RX ORDER — OMEPRAZOLE 40 MG/1
40 CAPSULE, DELAYED RELEASE ORAL DAILY
Qty: 90 CAPSULE | Refills: 0 | OUTPATIENT
Start: 2025-03-19

## 2025-03-19 NOTE — TELEPHONE ENCOUNTER
Rx Refill Note  Requested Prescriptions     Pending Prescriptions Disp Refills    omeprazole (priLOSEC) 40 MG capsule [Pharmacy Med Name: OMEPRAZOLE 40MG CPDR] 90 capsule 0     Sig: TAKE ONE CAPSULE BY MOUTH EVERY DAY      Last office visit with prescribing clinician: 10/11/2024     Next office visit with prescribing clinician: 4/2/2025      Filled 1/23/25  #90    Maricruz De La Torre LPN  03/19/25, 12:02 EDT    Denied, to early

## 2025-04-02 ENCOUNTER — TELEPHONE (OUTPATIENT)
Dept: FAMILY MEDICINE CLINIC | Age: 75
End: 2025-04-02

## 2025-04-02 ENCOUNTER — OFFICE VISIT (OUTPATIENT)
Dept: FAMILY MEDICINE CLINIC | Age: 75
End: 2025-04-02
Payer: MEDICARE

## 2025-04-02 VITALS
SYSTOLIC BLOOD PRESSURE: 150 MMHG | WEIGHT: 242.2 LBS | OXYGEN SATURATION: 100 % | DIASTOLIC BLOOD PRESSURE: 70 MMHG | HEIGHT: 75 IN | TEMPERATURE: 98.1 F | HEART RATE: 71 BPM | BODY MASS INDEX: 30.11 KG/M2

## 2025-04-02 DIAGNOSIS — M54.42 CHRONIC MIDLINE LOW BACK PAIN WITH BILATERAL SCIATICA: ICD-10-CM

## 2025-04-02 DIAGNOSIS — J30.89 OTHER ALLERGIC RHINITIS: ICD-10-CM

## 2025-04-02 DIAGNOSIS — G89.29 CHRONIC MIDLINE LOW BACK PAIN WITH BILATERAL SCIATICA: ICD-10-CM

## 2025-04-02 DIAGNOSIS — I10 ESSENTIAL HYPERTENSION: ICD-10-CM

## 2025-04-02 DIAGNOSIS — M54.41 CHRONIC MIDLINE LOW BACK PAIN WITH BILATERAL SCIATICA: ICD-10-CM

## 2025-04-02 DIAGNOSIS — Z79.899 HIGH RISK MEDICATION USE: ICD-10-CM

## 2025-04-02 DIAGNOSIS — R73.03 PREDIABETES: ICD-10-CM

## 2025-04-02 DIAGNOSIS — Z00.00 MEDICARE ANNUAL WELLNESS VISIT, SUBSEQUENT: Primary | ICD-10-CM

## 2025-04-02 DIAGNOSIS — E78.00 ELEVATED CHOLESTEROL: ICD-10-CM

## 2025-04-02 DIAGNOSIS — K21.9 GERD WITHOUT ESOPHAGITIS: ICD-10-CM

## 2025-04-02 PROBLEM — Z86.0100 HISTORY OF COLON POLYPS: Status: ACTIVE | Noted: 2025-04-02

## 2025-04-02 PROBLEM — Z12.11 SCREENING FOR MALIGNANT NEOPLASM OF COLON: Status: RESOLVED | Noted: 2024-12-04 | Resolved: 2025-04-02

## 2025-04-02 RX ORDER — MONTELUKAST SODIUM 10 MG/1
10 TABLET ORAL NIGHTLY
Qty: 30 TABLET | Refills: 1 | Status: SHIPPED | OUTPATIENT
Start: 2025-04-02

## 2025-04-02 NOTE — ASSESSMENT & PLAN NOTE
Hypertension is stable and controlled  Continue current treatment regimen.  Blood pressure will be reassessed in 6 months.    Orders:    Comprehensive Metabolic Panel; Future    TSH Rfx On Abnormal To Free T4; Future

## 2025-04-02 NOTE — TELEPHONE ENCOUNTER
----- Message from Natanael Cunha sent at 4/2/2025 10:40 AM EDT -----  THIS PATIENT HAD A COLONOSCOPY IN JANUARY BY DR MOBLEY, PLEASE ASK THAT OFFICE WHEN HE WILL NEED A RPEEAT, THANKS

## 2025-04-02 NOTE — ASSESSMENT & PLAN NOTE
ADVICE GIVEN RE:  SEATBELT USE, ALCOHOL USE, HEALTHY DIET, ROUTINE EYE AND DENTAL EXAM, ROUTINE VACCINATIONS.

## 2025-04-02 NOTE — ASSESSMENT & PLAN NOTE
NOT AT GOAL, WILL ADD SINGULAIR     Orders:    montelukast (Singulair) 10 MG tablet; Take 1 tablet by mouth Every Night.

## 2025-04-02 NOTE — ASSESSMENT & PLAN NOTE
IMPROVED WITH CURRENT TREATMENT, WILL REEVALUATE AT NEXT VISIT     Orders:    Hemoglobin A1c; Future

## 2025-04-02 NOTE — TELEPHONE ENCOUNTER
Colonoscopy 01/16/25 by Dr Alek Blanca    Impression:  - One 4 mm polyp in the transverse colon, removed with a cold biopsy forceps. Resected and retrieved. - The examination was otherwise normal.    Recommendation:  - Repeat colonoscopy in 5 years for surveillance. - Return to nurse practitioner (date not yet determined).

## 2025-04-02 NOTE — PROGRESS NOTES
Subjective   The ABCs of the Annual Wellness Visit  Medicare Wellness Visit      Ramirez Baltazar is a 74 y.o. patient who presents for a Medicare Wellness Visit.    The following portions of the patient's history were reviewed and   updated as appropriate: allergies, current medications, past family history, past medical history, past social history, past surgical history, and problem list.    Compared to one year ago, the patient's physical   health is the same.  Compared to one year ago, the patient's mental   health is the same.    Recent Hospitalizations:  He was not admitted to the hospital during the last year.     Current Medical Providers:  Patient Care Team:  Natanael Cunha MD as PCP - General (Family Medicine)  Rebeca Parrish MD as Consulting Physician (Urology)  Nikki Carney PA (Pain Medicine)  Jd April, APRN as Nurse Practitioner (Nurse Practitioner)    Outpatient Medications Prior to Visit   Medication Sig Dispense Refill    Cholecalciferol (Vitamin D3) 25 MCG (1000 UT) capsule Take 1 capsule by mouth Daily.      fluticasone (FLONASE) 50 MCG/ACT nasal spray 1 spray into the nostril(s) as directed by provider Daily. 18 g 3    gabapentin (NEURONTIN) 400 MG capsule Take 1 capsule by mouth 4 times daily 360 capsule 0    lisinopril (PRINIVIL,ZESTRIL) 20 MG tablet Take 1 tablet by mouth once daily 90 tablet 3    loratadine (EQ All Day Allergy Relief) 10 MG tablet Take 1 tablet by mouth once daily 90 tablet 3    Magnesium 250 MG tablet Take 1 tablet by mouth 2 (Two) Times a Day.      nystatin-triamcinolone (MYCOLOG) 644603-8.1 UNIT/GM-% ointment Apply to affected area 2-3 times daily 30 g 1    Omega-3 1000 MG capsule Take 1 capsule by mouth Daily.      omeprazole (priLOSEC) 40 MG capsule TAKE ONE CAPSULE BY MOUTH EVERY DAY 90 capsule 0    POTASSIUM PO Take  by mouth.      rizatriptan (MAXALT) 10 MG tablet Take 1 tablet by mouth 1 (One) Time As Needed for Migraine (onset of migraine, may  "repeat dose in 2 hours. max of 2 doses in 24 hours.) for up to 180 days. May repeat in 2 hours if needed 36 tablet 1    sertraline (ZOLOFT) 50 MG tablet Take 1 tablet by mouth once daily 90 tablet 3    TiZANidine (ZANAFLEX) 4 MG capsule Take 1 capsule by mouth At Night As Needed for Muscle Spasms. 90 capsule 1    Vit C-Cholecalciferol-Nabila Hip (VITAMIN C & D3/NABILA HIPS PO) Take 1 tablet by mouth Daily.      Vitamin E 180 MG (400 UNIT) capsule capsule Take 1-2 capsules by mouth Daily. REPORTS TAKES 1 A DAY ONE WEEK AND THEN TAKES 2 A DAY THE NEXT WEEK       No facility-administered medications prior to visit.     No opioid medication identified on active medication list. I have reviewed chart for other potential  high risk medication/s and harmful drug interactions in the elderly.      Aspirin is not on active medication list.  Aspirin use is not indicated based on review of current medical condition/s. Risk of harm outweighs potential benefits.  .    Patient Active Problem List   Diagnosis    Chronic midline low back pain with bilateral sciatica    Essential hypertension    Dysthymic disorder    Elevated cholesterol    Medicare annual wellness visit, subsequent    History of abdominal aortic aneurysm repair    History of prostate cancer    GERD without esophagitis    Prediabetes    Sensorineural hearing loss (SNHL) of both ears    Chronic right-sided headache    Tinnitus of both ears    Other allergic rhinitis    History of colon polyps (last scope was in 2025)     Advance Care Planning Advance Directive is not on file.  ACP discussion was held with the patient during this visit. Patient has an advance directive (not in EMR), copy requested.            Objective   Vitals:    04/02/25 0957   BP: 150/70   BP Location: Right arm   Patient Position: Sitting   Pulse: 71   Temp: 98.1 °F (36.7 °C)   TempSrc: Oral   SpO2: 100%  Comment: on RA   Weight: 110 kg (242 lb 3.2 oz)   Height: 190.5 cm (75\")   PainSc: 0-No pain " "      Estimated body mass index is 30.27 kg/m² as calculated from the following:    Height as of this encounter: 190.5 cm (75\").    Weight as of this encounter: 110 kg (242 lb 3.2 oz).    BMI is >= 30 and <35. (Class 1 Obesity). The following options were offered after discussion;: nutrition counseling/recommendations           Does the patient have evidence of cognitive impairment? No                                                                                                Health  Risk Assessment    Smoking Status:  Social History     Tobacco Use   Smoking Status Former    Current packs/day: 0.00    Average packs/day: 4.5 packs/day for 40.0 years (179.9 ttl pk-yrs)    Types: Cigarettes    Start date: 7/15/1962    Quit date: 10/24/1996    Years since quittin.4    Passive exposure: Past   Smokeless Tobacco Never   Tobacco Comments    Yes smoked most of my life     Alcohol Consumption:  Social History     Substance and Sexual Activity   Alcohol Use Not Currently    Alcohol/week: 2.0 standard drinks of alcohol    Types: 2 Cans of beer per week    Comment: Occasionally       Fall Risk Screen  STEADI Fall Risk Assessment was completed, and patient is at LOW risk for falls.Assessment completed on:2025    Depression Screening   Little interest or pleasure in doing things? Not at all   Feeling down, depressed, or hopeless? Not at all   PHQ-2 Total Score 0      Health Habits and Functional and Cognitive Screenin/2/2025     9:56 AM   Functional & Cognitive Status   Do you have difficulty preparing food and eating? No   Do you have difficulty bathing yourself, getting dressed or grooming yourself? No   Do you have difficulty using the toilet? No   Do you have difficulty moving around from place to place? No   Do you have trouble with steps or getting out of a bed or a chair? No   Current Diet Well Balanced Diet   Dental Exam Up to date   Eye Exam Up to date   Exercise (times per week) 7 times per week "   Current Exercises Include Walking;Home Exercise Program (TV, Computer, Etc.)   Do you need help using the phone?  No   Are you deaf or do you have serious difficulty hearing?  Yes   Do you need help to go to places out of walking distance? No   Do you need help shopping? No   Do you need help preparing meals?  No   Do you need help with housework?  No   Do you need help with laundry? No   Do you need help taking your medications? No   Do you need help managing money? No   Do you ever drive or ride in a car without wearing a seat belt? No   Have you felt unusual stress, anger or loneliness in the last month? No   Who do you live with? Spouse   If you need help, do you have trouble finding someone available to you? No   Have you been bothered in the last four weeks by sexual problems? No   Do you have difficulty concentrating, remembering or making decisions? No           Age-appropriate Screening Schedule:  Refer to the list below for future screening recommendations based on patient's age, sex and/or medical conditions. Orders for these recommended tests are listed in the plan section. The patient has been provided with a written plan.    Health Maintenance List  Health Maintenance   Topic Date Due    COVID-19 Vaccine (4 - 2024-25 season) 09/01/2024    LIPID PANEL  01/29/2025    TDAP/TD VACCINES (1 - Tdap) 10/11/2025 (Originally 11/13/1969)    ZOSTER VACCINE (1 of 2) 10/11/2025 (Originally 11/13/2000)    INFLUENZA VACCINE  07/01/2025    ANNUAL WELLNESS VISIT  04/02/2026    COLORECTAL CANCER SCREENING  01/16/2030    HEPATITIS C SCREENING  Completed    Pneumococcal Vaccine 50+  Completed    AAA SCREEN ONCE  Completed                                                                                                                                                CMS Preventative Services Quick Reference  Risk Factors Identified During Encounter  None Identified    The above risks/problems have been discussed with the  "patient.  Pertinent information has been shared with the patient in the After Visit Summary.  An After Visit Summary and PPPS were made available to the patient.    Follow Up:   Next Medicare Wellness visit to be scheduled in 1 year.         Additional E&M Note during same encounter follows:  Patient has additional, significant, and separately identifiable condition(s)/problem(s) that require work above and beyond the Medicare Wellness Visit     Chief Complaint  Medicare Wellness-subsequent    Subjective   --LAST CHOL  AND HGA1C WAS 6.8 % WITH DIETARY MEASURES ALONE  --GERD IS WELL CONTROLLED WITH THE PPI  --CONTINUES ON ROUTINE GABAPENTIN FOR CHRONIC LOW BACK PAIN, STABLE  --CONTINUOUS WATERY RUNNY NOSE DESPITE ANTIHISTAMINES AND STEROID NASAL SPRAYS  --TOLERATING BLOOD PRESSURE MEDICATION WITHOUT APPARENT SIDE EFFECTS          Review of Systems   Constitutional:  Negative for activity change, appetite change, chills, fatigue and fever.   HENT:  Positive for rhinorrhea. Negative for congestion, ear pain, hearing loss and sore throat.    Eyes:  Negative for discharge and visual disturbance.   Respiratory:  Negative for cough and shortness of breath.    Cardiovascular:  Negative for chest pain, palpitations and leg swelling.   Gastrointestinal:  Negative for abdominal pain, nausea and vomiting.   Genitourinary:  Negative for dysuria and hematuria.   Musculoskeletal:  Positive for arthralgias and back pain. Negative for myalgias.   Psychiatric/Behavioral:  Negative for dysphoric mood.               Objective   Vital Signs:  /70 (BP Location: Right arm, Patient Position: Sitting)   Pulse 71   Temp 98.1 °F (36.7 °C) (Oral)   Ht 190.5 cm (75\")   Wt 110 kg (242 lb 3.2 oz)   SpO2 100% Comment: on RA  BMI 30.27 kg/m²   Physical Exam  Vitals and nursing note reviewed.   Constitutional:       General: He is not in acute distress.     Appearance: Normal appearance.   HENT:      Right Ear: Tympanic membrane " normal.      Left Ear: Tympanic membrane normal.      Mouth/Throat:      Pharynx: Oropharynx is clear.   Eyes:      Conjunctiva/sclera: Conjunctivae normal.   Cardiovascular:      Rate and Rhythm: Normal rate and regular rhythm.      Heart sounds: Normal heart sounds. No murmur heard.  Pulmonary:      Effort: Pulmonary effort is normal.      Breath sounds: Normal breath sounds.   Abdominal:      General: Bowel sounds are normal.      Palpations: Abdomen is soft.      Tenderness: There is no abdominal tenderness.   Musculoskeletal:      Cervical back: Neck supple.      Right lower leg: No edema.      Left lower leg: No edema.   Lymphadenopathy:      Cervical: No cervical adenopathy.   Neurological:      General: No focal deficit present.      Mental Status: He is alert.      Cranial Nerves: No cranial nerve deficit.      Coordination: Coordination normal.      Gait: Gait normal.   Psychiatric:         Mood and Affect: Mood normal.         Behavior: Behavior normal.                    Assessment and Plan      High risk medication use    Orders:    POC Medline 12 Panel Urine Drug Screen    Elevated cholesterol   Lipid abnormalities are stable    Plan:  Continue same medication/s without change.      Discussed medication dosage, use, side effects, and goals of treatment in detail.    Counseled patient on lifestyle modifications to help control hyperlipidemia.     Patient Treatment Goals:   LDL goal is less than 70    Followup in 6 months.    Orders:    Lipid Panel; Future    Essential hypertension  Hypertension is stable and controlled  Continue current treatment regimen.  Blood pressure will be reassessed in 6 months.    Orders:    Comprehensive Metabolic Panel; Future    TSH Rfx On Abnormal To Free T4; Future    GERD without esophagitis  IMPROVED WITH CURRENT TREATMENT, WILL REEVALUATE AT NEXT VISIT     Orders:    CBC (No Diff); Future    Medicare annual wellness visit, subsequent  ADVICE GIVEN RE:  SEATBELT USE,  ALCOHOL USE, HEALTHY DIET, ROUTINE EYE AND DENTAL EXAM, ROUTINE VACCINATIONS.           Prediabetes  IMPROVED WITH CURRENT TREATMENT, WILL REEVALUATE AT NEXT VISIT     Orders:    Hemoglobin A1c; Future    Other allergic rhinitis  NOT AT GOAL, WILL ADD SINGULAIR     Orders:    montelukast (Singulair) 10 MG tablet; Take 1 tablet by mouth Every Night.    Chronic midline low back pain with bilateral sciatica  STABLE ON CURRENT MEDICATION.  RONNIE REVIEWED.  TOX SCREEN IS UP TO DATE.  THE CONTINUED USE OF A CONTROLLED SUBSTANCE IS NECESSARY FOR THIS PATIENT TO HAVE A NEAR NORMAL QUALITY OF LIFE AND WILL BE REVIEWED AT THE NEXT ROUTINE VISIT.                 Follow Up   Return in about 6 months (around 10/2/2025).  Patient was given instructions and counseling regarding his condition or for health maintenance advice. Please see specific information pulled into the AVS if appropriate.

## 2025-04-03 NOTE — TELEPHONE ENCOUNTER
This is what the op note says, but the pathology was not back yet on the polyp. Please call the office and ask them when he’s due for a repeat scope.

## 2025-04-22 ENCOUNTER — TELEPHONE (OUTPATIENT)
Dept: FAMILY MEDICINE CLINIC | Age: 75
End: 2025-04-22
Payer: MEDICARE

## 2025-04-24 ENCOUNTER — LAB (OUTPATIENT)
Dept: LAB | Facility: HOSPITAL | Age: 75
End: 2025-04-24
Payer: MEDICARE

## 2025-04-24 DIAGNOSIS — R73.03 PREDIABETES: ICD-10-CM

## 2025-04-24 DIAGNOSIS — E78.00 ELEVATED CHOLESTEROL: ICD-10-CM

## 2025-04-24 DIAGNOSIS — K21.9 GERD WITHOUT ESOPHAGITIS: ICD-10-CM

## 2025-04-24 DIAGNOSIS — I10 ESSENTIAL HYPERTENSION: ICD-10-CM

## 2025-04-24 LAB
ALBUMIN SERPL-MCNC: 4.4 G/DL (ref 3.5–5.2)
ALBUMIN/GLOB SERPL: 1.5 G/DL
ALP SERPL-CCNC: 67 U/L (ref 39–117)
ALT SERPL W P-5'-P-CCNC: 19 U/L (ref 1–41)
ANION GAP SERPL CALCULATED.3IONS-SCNC: 9.9 MMOL/L (ref 5–15)
AST SERPL-CCNC: 20 U/L (ref 1–40)
BILIRUB SERPL-MCNC: 0.4 MG/DL (ref 0–1.2)
BUN SERPL-MCNC: 19 MG/DL (ref 8–23)
BUN/CREAT SERPL: 17.6 (ref 7–25)
CALCIUM SPEC-SCNC: 9.8 MG/DL (ref 8.6–10.5)
CHLORIDE SERPL-SCNC: 100 MMOL/L (ref 98–107)
CHOLEST SERPL-MCNC: 241 MG/DL (ref 0–200)
CO2 SERPL-SCNC: 26.1 MMOL/L (ref 22–29)
CREAT SERPL-MCNC: 1.08 MG/DL (ref 0.76–1.27)
DEPRECATED RDW RBC AUTO: 42.8 FL (ref 37–54)
EGFRCR SERPLBLD CKD-EPI 2021: 72 ML/MIN/1.73
ERYTHROCYTE [DISTWIDTH] IN BLOOD BY AUTOMATED COUNT: 12.4 % (ref 12.3–15.4)
GLOBULIN UR ELPH-MCNC: 2.9 GM/DL
GLUCOSE SERPL-MCNC: 167 MG/DL (ref 65–99)
HBA1C MFR BLD: 6.7 % (ref 4.8–5.6)
HCT VFR BLD AUTO: 44.6 % (ref 37.5–51)
HDLC SERPL-MCNC: 29 MG/DL (ref 40–60)
HGB BLD-MCNC: 14.6 G/DL (ref 13–17.7)
LDLC SERPL CALC-MCNC: 118 MG/DL (ref 0–100)
LDLC/HDLC SERPL: 3.66 {RATIO}
MCH RBC QN AUTO: 30.5 PG (ref 26.6–33)
MCHC RBC AUTO-ENTMCNC: 32.7 G/DL (ref 31.5–35.7)
MCV RBC AUTO: 93.1 FL (ref 79–97)
PLATELET # BLD AUTO: 161 10*3/MM3 (ref 140–450)
PMV BLD AUTO: 10 FL (ref 6–12)
POTASSIUM SERPL-SCNC: 4.9 MMOL/L (ref 3.5–5.2)
PROT SERPL-MCNC: 7.3 G/DL (ref 6–8.5)
RBC # BLD AUTO: 4.79 10*6/MM3 (ref 4.14–5.8)
SODIUM SERPL-SCNC: 136 MMOL/L (ref 136–145)
TRIGL SERPL-MCNC: 530 MG/DL (ref 0–150)
TSH SERPL DL<=0.05 MIU/L-ACNC: 1.37 UIU/ML (ref 0.27–4.2)
VLDLC SERPL-MCNC: 94 MG/DL (ref 5–40)
WBC NRBC COR # BLD AUTO: 6.61 10*3/MM3 (ref 3.4–10.8)

## 2025-04-24 PROCEDURE — 80061 LIPID PANEL: CPT

## 2025-04-24 PROCEDURE — 80053 COMPREHEN METABOLIC PANEL: CPT

## 2025-04-24 PROCEDURE — 84443 ASSAY THYROID STIM HORMONE: CPT

## 2025-04-24 PROCEDURE — 85027 COMPLETE CBC AUTOMATED: CPT

## 2025-04-24 PROCEDURE — 36415 COLL VENOUS BLD VENIPUNCTURE: CPT

## 2025-04-24 PROCEDURE — 83036 HEMOGLOBIN GLYCOSYLATED A1C: CPT

## 2025-04-25 ENCOUNTER — RESULTS FOLLOW-UP (OUTPATIENT)
Dept: FAMILY MEDICINE CLINIC | Age: 75
End: 2025-04-25
Payer: MEDICARE

## 2025-04-25 DIAGNOSIS — E78.00 ELEVATED CHOLESTEROL: Primary | ICD-10-CM

## 2025-04-25 NOTE — LETTER
Ramirez Baltazar  [address] [place] [id number]    April 25, 2025     Dear Mr. Baltazar:    Your labs are OK except cholesterol is higher. Work on diet and repeat a fasting lipid panel in three months I've added some information on a cholesterol diet to help you.          Cholesterol Content in Foods  Cholesterol is a waxy, fat-like substance that helps to carry fat in the blood. The body needs cholesterol in small amounts, but too much cholesterol can cause damage to the arteries and heart.  Most people should eat less than 200 milligrams (mg) of cholesterol a day.  Foods with cholesterol    Cholesterol is found in animal-based foods, such as meat, seafood, and dairy. Generally, low-fat dairy and lean meats have less cholesterol than full-fat dairy and fatty meats. The milligrams of cholesterol per serving (mg per serving) of common cholesterol-containing foods are listed below.  Meat and other proteins  Egg -- one large whole egg has 186 mg.  Veal shank -- 4 oz has 141 mg.  Lean ground turkey (93% lean) -- 4 oz has 118 mg.  Fat-trimmed lamb loin -- 4 oz has 106 mg.  Lean ground beef (90% lean) -- 4 oz has 100 mg.  Lobster -- 3.5 oz has 90 mg.  Pork loin chops -- 4 oz has 86 mg.  Canned salmon -- 3.5 oz has 83 mg.  Fat-trimmed beef top loin -- 4 oz has 78 mg.  Frankfurter -- 1 monroe (3.5 oz) has 77 mg.  Crab -- 3.5 oz has 71 mg.  Roasted chicken without skin, white meat -- 4 oz has 66 mg.  Light bologna -- 2 oz has 45 mg.  Deli-cut turkey -- 2 oz has 31 mg.  Canned tuna -- 3.5 oz has 31 mg.  Garcia -- 1 oz has 29 mg.  Oysters and mussels (raw) -- 3.5 oz has 25 mg.  Mackerel -- 1 oz has 22 mg.  Trout -- 1 oz has 20 mg.  Pork sausage -- 1 link (1 oz) has 17 mg.  [place]inton -- 1 oz has 16 mg.  Tilapia -- 1 oz has 14 mg.  Dairy  Soft-serve ice cream -- ½ cup (4 oz) has 103 mg.  Whole-milk yogurt -- 1 cup (8 oz) has 29 mg.  Cheddar cheese -- 1 oz has 28 mg.  American cheese -- 1 oz has 28 mg.  Whole milk -- 1 cup (8 oz) has  23 mg.  2% milk -- 1 cup (8 oz) has 18 mg.  Cream cheese -- 1 tablespoon (Tbsp) has 15 mg.  Cottage cheese -- ½ cup (4 oz) has 14 mg.  Low-fat (1%) milk -- 1 cup (8 oz) has 10 mg.  Sour cream -- 1 Tbsp has 8.5 mg.  Low-fat yogurt -- 1 cup (8 oz) has 8 mg.  Nonfat Greek yogurt -- 1 cup (8 oz) has 7 mg.  Half-and-half cream -- 1 Tbsp has 5 mg.  Fats and oils  Cod liver oil -- 1 tablespoon (Tbsp) has 82 mg.  Butter -- 1 Tbsp has 15 mg.  Lard -- 1 Tbsp has 14 mg.  Garcia grease -- 1 Tbsp has 14 mg.  Mayonnaise -- 1 Tbsp has 5-10 mg.  Margarine -- 1 Tbsp has 3-10 mg.  Exact amounts of cholesterol in these foods may vary depending on specific ingredients and brands.  Foods without cholesterol  Most plant-based foods do not have cholesterol unless you combine them with a food that has cholesterol. Foods without cholesterol include:  Grains and cereals.  Vegetables.  Fruits.  Vegetable oils, such as olive, canola, and sunflower oil.  Legumes, such as peas, beans, and lentils.  Nuts and seeds.  Egg whites.  Summary  The body needs cholesterol in small amounts, but too much cholesterol can cause damage to the arteries and heart.  Most people should eat less than 200 milligrams (mg) of cholesterol a day.  This information is not intended to replace advice given to you by your health care provider. Make sure you discuss any questions you have with your health care provider.  Document Revised: 05/10/2021 Document Reviewed: 05/10/2021  [employer] Education © 2021 [employer]      Below are the results from your recent visit:    Resulted Orders   CBC (No Diff)   Result Value Ref Range    WBC 6.61 3.40 - 10.80 10*3/mm3    RBC 4.79 4.14 - 5.80 10*6/mm3    Hemoglobin 14.6 13.0 - 17.7 g/dL    Hematocrit 44.6 37.5 - 51.0 %    MCV 93.1 79.0 - 97.0 fL    MCH 30.5 26.6 - 33.0 pg    MCHC 32.7 31.5 - 35.7 g/dL    RDW 12.4 12.3 - 15.4 %    RDW-SD 42.8 37.0 - 54.0 fl    MPV 10.0 6.0 - 12.0 fL    Platelets 161 140 - 450 10*3/mm3    Comprehensive Metabolic Panel   Result Value Ref Range    Glucose 167 (H) 65 - 99 mg/dL    BUN 19 8 - 23 mg/dL    Creatinine 1.08 0.76 - 1.27 mg/dL    Sodium 136 136 - 145 mmol/L    Potassium 4.9 3.5 - 5.2 mmol/L    Chloride 100 98 - 107 mmol/L    CO2 26.1 22.0 - 29.0 mmol/L    Calcium 9.8 8.6 - 10.5 mg/dL    Total Protein 7.3 6.0 - 8.5 g/dL    Albumin 4.4 3.5 - 5.2 g/dL    ALT (SGPT) 19 1 - 41 U/L    AST (SGOT) 20 1 - 40 U/L    Alkaline Phosphatase 67 39 - 117 U/L    Total Bilirubin 0.4 0.0 - 1.2 mg/dL    Globulin 2.9 gm/dL    A/G Ratio 1.5 g/dL    BUN/Creatinine Ratio 17.6 7.0 - 25.0    Anion Gap 9.9 5.0 - 15.0 mmol/L    eGFR 72.0 >60.0 mL/min/1.73   Hemoglobin A1c   Result Value Ref Range    Hemoglobin A1C 6.70 (H) 4.80 - 5.60 %   Lipid Panel   Result Value Ref Range    Total Cholesterol 241 (H) 0 - 200 mg/dL    Triglycerides 530 (H) 0 - 150 mg/dL    HDL Cholesterol 29 (L) 40 - 60 mg/dL    LDL Cholesterol  118 (H) 0 - 100 mg/dL    VLDL Cholesterol 94 (H) 5 - 40 mg/dL    LDL/HDL Ratio 3.66    TSH Rfx On Abnormal To Free T4   Result Value Ref Range    TSH 1.370 0.270 - 4.200 uIU/mL         If you have any questions or concerns, please don't hesitate to call.         Sincerely,        Natanael Cunha MD

## 2025-04-29 NOTE — TELEPHONE ENCOUNTER
50-year-old man signed out to me by Dr. Nj after presenting for headache.  He was instructed to follow-up on his labs and reassess him after he received a migraine cocktail.  Please see initial notes for details.    Patient feeling better on reexamination and is requesting discharge.  Discussed follow-up with his neurologist.  His labs are grossly reassuring.  Patient discharged in stable condition     Logan Nair MD  04/29/25 0801     Sent in ofloxacin.  Normal heart sounds. No murmur heard.  Pulmonary:      Effort: Pulmonary effort is normal. No respiratory distress.      Breath sounds: Normal breath sounds.   Abdominal:      General: Abdomen is flat. Bowel sounds are normal.      Palpations: Abdomen is soft.      Tenderness: There is no guarding or rebound.   Skin:     General: Skin is warm.      Capillary Refill: Capillary refill takes less than 2 seconds.   Neurological:      General: No focal deficit present.      Mental Status: He is alert and oriented to person, place, and time.      Cranial Nerves: No cranial nerve deficit.      Sensory: No sensory deficit.      Motor: No weakness.         DIAGNOSTIC RESULTS     EKG: All EKG's are interpreted by the Emergency Department Physician who either signs or Co-signs this chart in the absence of a cardiologist.        RADIOLOGY:   Non-plain film images such as CT, Ultrasound and MRI are read by the radiologist. Plain radiographic images are visualized and preliminarily interpreted by the emergency physician with the below findings:        Interpretation per the Radiologist below, if available at the time of this note:    CT Head W/O Contrast   Final Result   Normal noncontrast CT head. No change.            Electronically signed by Esteban Miller           LABS:  Labs Reviewed   EXTRA TUBES HOLD   CBC WITH AUTO DIFFERENTIAL   COMPREHENSIVE METABOLIC PANEL       All other labs were within normal range or not returned as of this dictation.    EMERGENCY DEPARTMENT COURSE and DIFFERENTIAL DIAGNOSIS/MDM:   Vitals:    Vitals:    04/29/25 0558 04/29/25 0646   BP: (!) 110/97 139/85   Pulse: 70    Resp: 16    Temp: 98.7 °F (37.1 °C)    TempSrc: Oral    SpO2: 100% 97%   Weight: 80 kg (176 lb 5.9 oz)    Height: 1.829 m (6')            Medical Decision Making  Differential diagnosis includes not limited to but benign headache, less likely stroke, ventricular hemorrhage, meningitis, encephalitis.  Patient is well-appearing exam

## 2025-05-08 DIAGNOSIS — G89.29 CHRONIC MIDLINE LOW BACK PAIN WITH BILATERAL SCIATICA: ICD-10-CM

## 2025-05-08 DIAGNOSIS — M54.41 CHRONIC MIDLINE LOW BACK PAIN WITH BILATERAL SCIATICA: ICD-10-CM

## 2025-05-08 DIAGNOSIS — M54.42 CHRONIC MIDLINE LOW BACK PAIN WITH BILATERAL SCIATICA: ICD-10-CM

## 2025-05-08 NOTE — TELEPHONE ENCOUNTER
Controlled refill request:  Requested Prescriptions     Pending Prescriptions Disp Refills    gabapentin (NEURONTIN) 400 MG capsule [Pharmacy Med Name: Gabapentin 400 MG Oral Capsule] 360 capsule 0     Sig: Take 1 capsule by mouth 4 times daily      Last OV:  4/2/2025  Next OV:  10/2/2025  Last fill:  02/12/25, #360  Last tox:  04/02/2025    Ok until Dr Cunha returns to the office

## 2025-05-11 RX ORDER — GABAPENTIN 400 MG/1
400 CAPSULE ORAL 4 TIMES DAILY
Qty: 360 CAPSULE | Refills: 0 | Status: SHIPPED | OUTPATIENT
Start: 2025-05-11

## 2025-06-02 DIAGNOSIS — J30.89 OTHER ALLERGIC RHINITIS: ICD-10-CM

## 2025-06-02 RX ORDER — MONTELUKAST SODIUM 10 MG/1
10 TABLET ORAL NIGHTLY
Qty: 90 TABLET | Refills: 0 | Status: SHIPPED | OUTPATIENT
Start: 2025-06-02

## 2025-07-30 ENCOUNTER — TRANSCRIBE ORDERS (OUTPATIENT)
Age: 75
End: 2025-07-30
Payer: MEDICARE

## 2025-07-30 DIAGNOSIS — I65.23 BILATERAL CAROTID ARTERY STENOSIS: Primary | ICD-10-CM

## 2025-07-30 DIAGNOSIS — I71.43 INFRARENAL ABDOMINAL AORTIC ANEURYSM (AAA) WITHOUT RUPTURE: Primary | ICD-10-CM

## 2025-08-05 DIAGNOSIS — M54.42 CHRONIC MIDLINE LOW BACK PAIN WITH BILATERAL SCIATICA: ICD-10-CM

## 2025-08-05 DIAGNOSIS — M54.41 CHRONIC MIDLINE LOW BACK PAIN WITH BILATERAL SCIATICA: ICD-10-CM

## 2025-08-05 DIAGNOSIS — G89.29 CHRONIC MIDLINE LOW BACK PAIN WITH BILATERAL SCIATICA: ICD-10-CM

## 2025-08-06 RX ORDER — GABAPENTIN 400 MG/1
400 CAPSULE ORAL 4 TIMES DAILY
Qty: 360 CAPSULE | Refills: 0 | Status: SHIPPED | OUTPATIENT
Start: 2025-08-06

## 2025-08-11 ENCOUNTER — TELEPHONE (OUTPATIENT)
Dept: FAMILY MEDICINE CLINIC | Age: 75
End: 2025-08-11
Payer: MEDICARE

## 2025-08-13 ENCOUNTER — LAB (OUTPATIENT)
Dept: LAB | Facility: HOSPITAL | Age: 75
End: 2025-08-13
Payer: MEDICARE

## 2025-08-13 ENCOUNTER — RESULTS FOLLOW-UP (OUTPATIENT)
Dept: FAMILY MEDICINE CLINIC | Age: 75
End: 2025-08-13
Payer: MEDICARE

## 2025-08-13 DIAGNOSIS — E78.00 ELEVATED CHOLESTEROL: ICD-10-CM

## 2025-08-13 LAB
CHOLEST SERPL-MCNC: 257 MG/DL (ref 0–200)
HDLC SERPL-MCNC: 30 MG/DL (ref 40–60)
LDLC SERPL CALC-MCNC: 120 MG/DL (ref 0–100)
LDLC/HDLC SERPL: 3.57 {RATIO}
TRIGL SERPL-MCNC: 599 MG/DL (ref 0–150)
VLDLC SERPL-MCNC: 107 MG/DL (ref 5–40)

## 2025-08-13 PROCEDURE — 36415 COLL VENOUS BLD VENIPUNCTURE: CPT

## 2025-08-13 PROCEDURE — 80061 LIPID PANEL: CPT

## 2025-08-13 RX ORDER — ATORVASTATIN CALCIUM 10 MG/1
10 TABLET, FILM COATED ORAL NIGHTLY
Qty: 90 TABLET | Refills: 1 | Status: SHIPPED | OUTPATIENT
Start: 2025-08-13 | End: 2026-02-09

## (undated) DEVICE — SOL IRRG H2O PL/BG 1000ML STRL

## (undated) DEVICE — SUT VIC 0 UR6 27IN VCP603H

## (undated) DEVICE — HANDPIECE SET WITH COAXIAL HIGH FLOW TIP AND SUCTION TUBE: Brand: INTERPULSE

## (undated) DEVICE — DRSNG TELFA PAD NONADH STR 1S 3X8IN

## (undated) DEVICE — SOLIDIFIER LIQLOC PLS 1500CC BT

## (undated) DEVICE — GLV SURG BIOGEL LTX PF 8 1/2

## (undated) DEVICE — CANNULA SEAL

## (undated) DEVICE — NON-WOVEN ADHESIVE WOUND DRESSING: Brand: PRIMAPORE ADHESIVE DRESSING 10*8CM

## (undated) DEVICE — TISSUE RETRIEVAL SYSTEM: Brand: INZII RETRIEVAL SYSTEM

## (undated) DEVICE — SOL NACL 0.9PCT 1000ML

## (undated) DEVICE — ANTIBACTERIAL VIOLET BRAIDED (POLYGLACTIN 910), SYNTHETIC ABSORBABLE SUTURE: Brand: COATED VICRYL

## (undated) DEVICE — TIP COVER ACCESSORY

## (undated) DEVICE — CATH FOL COUNCL 2WY 20F 5CC

## (undated) DEVICE — BLADELESS OBTURATOR: Brand: WECK VISTA

## (undated) DEVICE — LINER SURG CANSTR SXN S/RIGD 1500CC

## (undated) DEVICE — CATHETER,FOLEY,100%SILICONE,20FR,10ML,LF: Brand: MEDLINE

## (undated) DEVICE — SOL IRR H2O BO 1000ML STRL

## (undated) DEVICE — DAVINCI-LF: Brand: MEDLINE INDUSTRIES, INC.

## (undated) DEVICE — VIOLET BRAIDED (POLYGLACTIN 910), SYNTHETIC ABSORBABLE SUTURE: Brand: COATED VICRYL

## (undated) DEVICE — CONTAINER,SPECIMEN,OR STERILE,4OZ: Brand: MEDLINE

## (undated) DEVICE — LIGHT SLEEVE: Brand: DEVON

## (undated) DEVICE — SKIN PREP TRAY W/CHG: Brand: MEDLINE INDUSTRIES, INC.

## (undated) DEVICE — SUT MNCRYL PLS ANTIB UD 4/0 PS2 18IN

## (undated) DEVICE — GLV SURG BIOGEL LTX PF 8

## (undated) DEVICE — DUAL CUT SAGITTAL BLADE

## (undated) DEVICE — LAPAROSCOPIC TROCAR SLEEVE/SINGLE USE: Brand: KII® OPTICAL ACCESS SYSTEM

## (undated) DEVICE — SUT VIC PLS CTD BR 0 TIE 18IN VIL

## (undated) DEVICE — APPL CHLORAPREP HI/LITE 26ML ORNG

## (undated) DEVICE — MAT FLR ABSORBENT LG 4FT 10 2.5FT

## (undated) DEVICE — SOL ISO/ALC RUB 70PCT 4OZ

## (undated) DEVICE — TRY SKINPREP DRYPREP

## (undated) DEVICE — PENCL E/S SMOKEEVAC W/TELESCP CANN

## (undated) DEVICE — GAMMEX® NON-LATEX SIZE 7.5, STERILE NEOPRENE POWDER-FREE SURGICAL GLOVE: Brand: GAMMEX

## (undated) DEVICE — TUBING, SUCTION, 1/4" X 10', STRAIGHT: Brand: MEDLINE

## (undated) DEVICE — CONN JET HYDRA H20 AUXILIARY DISP

## (undated) DEVICE — SYR LUERLOK 50ML

## (undated) DEVICE — STERILE POLYISOPRENE POWDER-FREE SURGICAL GLOVES: Brand: PROTEXIS

## (undated) DEVICE — SUT PDS 0 CT1 36IN Z346H

## (undated) DEVICE — 1016 S-DRAPE IRRIG POUCH 10/BOX: Brand: STERI-DRAPE™

## (undated) DEVICE — STERILE POLYISOPRENE POWDER-FREE SURGICAL GLOVES WITH EMOLLIENT COATING: Brand: PROTEXIS

## (undated) DEVICE — CATH FOL BARDEX IC 2WY 22F 5CC

## (undated) DEVICE — Device

## (undated) DEVICE — GLV SURG SENSICARE W/ALOE PF LF 8 STRL

## (undated) DEVICE — TUBING, SUCTION, 1/4" X 20', STRAIGHT: Brand: MEDLINE INDUSTRIES, INC.

## (undated) DEVICE — ADHS SKIN DERMABOND TOP ADVANCED

## (undated) DEVICE — NON-WOVEN ADHESIVE WOUND DRESSING: Brand: PRIMAPORE ADHESIVE WOUND DRSG 7.2*5CM

## (undated) DEVICE — IMMOB SHLDR PAD2 UNIV SM

## (undated) DEVICE — KT ANTI FOG W/FLD AND SPNG

## (undated) DEVICE — 2, DISPOSABLE SUCTION/IRRIGATOR WITHOUT DISPOSABLE TIP: Brand: STRYKEFLOW

## (undated) DEVICE — METER,URINE,400ML,DRAIN BAG,L/F,LL,SLIDE: Brand: MEDLINE

## (undated) DEVICE — BANDAGE,GAUZE,BULKEE II,4.5"X4.1YD,STRL: Brand: MEDLINE

## (undated) DEVICE — 3M™ IOBAN™ 2 ANTIMICROBIAL INCISE DRAPE 6650EZ: Brand: IOBAN™ 2

## (undated) DEVICE — ANTIBACTERIAL UNDYED BRAIDED (POLYGLACTIN 910), SYNTHETIC ABSORBABLE SUTURE: Brand: COATED VICRYL

## (undated) DEVICE — DRAPE,U/ SHT,SPLIT,PLAS,STERIL: Brand: MEDLINE

## (undated) DEVICE — LAPAROSCOPIC SCISSORS: Brand: EPIX LAPAROSCOPIC SCISSORS

## (undated) DEVICE — OPTIFOAM GENTLE SA, POSTOP, 4X8: Brand: MEDLINE

## (undated) DEVICE — TOWEL,OR,DSP,ST,BLUE,STD,4/PK,20PK/CS: Brand: MEDLINE

## (undated) DEVICE — SOL IRR NACL 0.9PCT BT 1000ML

## (undated) DEVICE — PREP SOL POVIDONE/IODINE BT 4OZ

## (undated) DEVICE — APPL CHLORAPREP W/TINT 26ML ORNG

## (undated) DEVICE — PK SHLDR OPN 40

## (undated) DEVICE — Device: Brand: DEFENDO AIR/WATER/SUCTION AND BIOPSY VALVE

## (undated) DEVICE — SINGLE-USE BIOPSY FORCEPS: Brand: RADIAL JAW 4

## (undated) DEVICE — DRSNG SURESITE123 4X10IN

## (undated) DEVICE — DRSNG GZ PETROLTM XEROFORM CURAD 1X8IN STRL

## (undated) DEVICE — SUT VIC 0 CT1 36IN J946H

## (undated) DEVICE — VESSEL SEALER EXTEND: Brand: ENDOWRIST

## (undated) DEVICE — TROCAR: Brand: KII FIOS FIRST ENTRY

## (undated) DEVICE — SLV SCD KN/LEN ADJ EXPRSS BLENDED MD 1P/U

## (undated) DEVICE — BIT DRL SCRW PERIPH 2.7MM

## (undated) DEVICE — ARM DRAPE

## (undated) DEVICE — APPL HEMO SURG ARISTA/AH/FLEXITIP XLR 38CM